# Patient Record
Sex: FEMALE | Race: WHITE | Employment: OTHER | URBAN - METROPOLITAN AREA
[De-identification: names, ages, dates, MRNs, and addresses within clinical notes are randomized per-mention and may not be internally consistent; named-entity substitution may affect disease eponyms.]

---

## 2018-09-21 ENCOUNTER — HOSPITAL ENCOUNTER (EMERGENCY)
Age: 78
Discharge: HOME | End: 2018-09-21
Attending: EMERGENCY MEDICINE
Payer: MEDICARE

## 2018-09-21 ENCOUNTER — APPOINTMENT (OUTPATIENT)
Dept: GENERAL RADIOLOGY | Age: 78
End: 2018-09-21
Payer: MEDICARE

## 2018-09-21 ENCOUNTER — APPOINTMENT (OUTPATIENT)
Dept: CT IMAGING | Age: 78
End: 2018-09-21
Payer: MEDICARE

## 2018-09-21 VITALS
OXYGEN SATURATION: 95 % | RESPIRATION RATE: 16 BRPM | HEART RATE: 66 BPM | BODY MASS INDEX: 34.27 KG/M2 | HEIGHT: 59 IN | SYSTOLIC BLOOD PRESSURE: 107 MMHG | WEIGHT: 170 LBS | DIASTOLIC BLOOD PRESSURE: 56 MMHG | TEMPERATURE: 97.4 F

## 2018-09-21 DIAGNOSIS — H81.10 BENIGN PAROXYSMAL POSITIONAL VERTIGO, UNSPECIFIED LATERALITY: Primary | ICD-10-CM

## 2018-09-21 DIAGNOSIS — R11.0 NAUSEA: ICD-10-CM

## 2018-09-21 LAB
A/G RATIO: 1.6 (ref 1.1–2.2)
ALBUMIN SERPL-MCNC: 4.4 G/DL (ref 3.4–5)
ALP BLD-CCNC: 68 U/L (ref 40–129)
ALT SERPL-CCNC: 8 U/L (ref 10–40)
ANION GAP SERPL CALCULATED.3IONS-SCNC: 16 MMOL/L (ref 3–16)
AST SERPL-CCNC: 10 U/L (ref 15–37)
BASOPHILS ABSOLUTE: 0.1 K/UL (ref 0–0.2)
BASOPHILS RELATIVE PERCENT: 0.9 %
BILIRUB SERPL-MCNC: 0.6 MG/DL (ref 0–1)
BUN BLDV-MCNC: 36 MG/DL (ref 7–20)
CALCIUM SERPL-MCNC: 9.3 MG/DL (ref 8.3–10.6)
CHLORIDE BLD-SCNC: 101 MMOL/L (ref 99–110)
CO2: 26 MMOL/L (ref 21–32)
CREAT SERPL-MCNC: 1 MG/DL (ref 0.6–1.2)
EKG ATRIAL RATE: 61 BPM
EKG DIAGNOSIS: NORMAL
EKG P AXIS: 15 DEGREES
EKG P-R INTERVAL: 142 MS
EKG Q-T INTERVAL: 462 MS
EKG QRS DURATION: 110 MS
EKG QTC CALCULATION (BAZETT): 465 MS
EKG R AXIS: -27 DEGREES
EKG T AXIS: 66 DEGREES
EKG VENTRICULAR RATE: 61 BPM
EOSINOPHILS ABSOLUTE: 0.2 K/UL (ref 0–0.6)
EOSINOPHILS RELATIVE PERCENT: 3.1 %
GFR AFRICAN AMERICAN: >60
GFR NON-AFRICAN AMERICAN: 54
GLOBULIN: 2.7 G/DL
GLUCOSE BLD-MCNC: 165 MG/DL (ref 70–99)
HCT VFR BLD CALC: 39.3 % (ref 36–48)
HEMOGLOBIN: 13.6 G/DL (ref 12–16)
LYMPHOCYTES ABSOLUTE: 2 K/UL (ref 1–5.1)
LYMPHOCYTES RELATIVE PERCENT: 32 %
MCH RBC QN AUTO: 33.1 PG (ref 26–34)
MCHC RBC AUTO-ENTMCNC: 34.7 G/DL (ref 31–36)
MCV RBC AUTO: 95.4 FL (ref 80–100)
MONOCYTES ABSOLUTE: 0.5 K/UL (ref 0–1.3)
MONOCYTES RELATIVE PERCENT: 8.3 %
NEUTROPHILS ABSOLUTE: 3.5 K/UL (ref 1.7–7.7)
NEUTROPHILS RELATIVE PERCENT: 55.7 %
PDW BLD-RTO: 12.5 % (ref 12.4–15.4)
PLATELET # BLD: 202 K/UL (ref 135–450)
PMV BLD AUTO: 8.5 FL (ref 5–10.5)
POTASSIUM SERPL-SCNC: 3.5 MMOL/L (ref 3.5–5.1)
RBC # BLD: 4.12 M/UL (ref 4–5.2)
SODIUM BLD-SCNC: 143 MMOL/L (ref 136–145)
TOTAL PROTEIN: 7.1 G/DL (ref 6.4–8.2)
TROPONIN: <0.01 NG/ML
WBC # BLD: 6.3 K/UL (ref 4–11)

## 2018-09-21 PROCEDURE — 93010 ELECTROCARDIOGRAM REPORT: CPT | Performed by: INTERNAL MEDICINE

## 2018-09-21 PROCEDURE — 85025 COMPLETE CBC W/AUTO DIFF WBC: CPT

## 2018-09-21 PROCEDURE — 70450 CT HEAD/BRAIN W/O DYE: CPT

## 2018-09-21 PROCEDURE — 93005 ELECTROCARDIOGRAM TRACING: CPT | Performed by: EMERGENCY MEDICINE

## 2018-09-21 PROCEDURE — 71045 X-RAY EXAM CHEST 1 VIEW: CPT

## 2018-09-21 PROCEDURE — 6360000002 HC RX W HCPCS

## 2018-09-21 PROCEDURE — 80053 COMPREHEN METABOLIC PANEL: CPT

## 2018-09-21 PROCEDURE — 6370000000 HC RX 637 (ALT 250 FOR IP): Performed by: EMERGENCY MEDICINE

## 2018-09-21 PROCEDURE — 84484 ASSAY OF TROPONIN QUANT: CPT

## 2018-09-21 PROCEDURE — 4500000002 HC ER NO CHARGE

## 2018-09-21 RX ORDER — MECLIZINE HCL 12.5 MG/1
25 TABLET ORAL ONCE
Status: COMPLETED | OUTPATIENT
Start: 2018-09-21 | End: 2018-09-21

## 2018-09-21 RX ORDER — ONDANSETRON 4 MG/1
4 TABLET, ORALLY DISINTEGRATING ORAL EVERY 8 HOURS PRN
Qty: 12 TABLET | Refills: 0 | Status: ON HOLD | OUTPATIENT
Start: 2018-09-21 | End: 2018-11-12 | Stop reason: ALTCHOICE

## 2018-09-21 RX ORDER — ONDANSETRON 2 MG/ML
INJECTION INTRAMUSCULAR; INTRAVENOUS
Status: COMPLETED
Start: 2018-09-21 | End: 2018-09-21

## 2018-09-21 RX ORDER — ONDANSETRON 2 MG/ML
4 INJECTION INTRAMUSCULAR; INTRAVENOUS ONCE
Status: COMPLETED | OUTPATIENT
Start: 2018-09-21 | End: 2018-09-21

## 2018-09-21 RX ORDER — MECLIZINE HYDROCHLORIDE 25 MG/1
25 TABLET ORAL 3 TIMES DAILY PRN
Qty: 30 TABLET | Refills: 0 | Status: SHIPPED | OUTPATIENT
Start: 2018-09-21 | End: 2018-10-01

## 2018-09-21 RX ADMIN — ONDANSETRON 4 MG: 2 INJECTION INTRAMUSCULAR; INTRAVENOUS at 09:34

## 2018-09-21 RX ADMIN — MECLIZINE 25 MG: 12.5 TABLET ORAL at 10:40

## 2018-09-21 ASSESSMENT — ENCOUNTER SYMPTOMS
NAUSEA: 1
CONSTIPATION: 0
RECTAL PAIN: 0
VOICE CHANGE: 0
COLOR CHANGE: 0
BACK PAIN: 0
CHEST TIGHTNESS: 0
STRIDOR: 0
SINUS PRESSURE: 0
BLOOD IN STOOL: 0
ABDOMINAL PAIN: 0
APNEA: 0
DIARRHEA: 0
SHORTNESS OF BREATH: 0
COUGH: 0
EYE DISCHARGE: 0
EYE PAIN: 0
PHOTOPHOBIA: 0
VOMITING: 0
EYE REDNESS: 0
ABDOMINAL DISTENTION: 0
RHINORRHEA: 0
SORE THROAT: 0
TROUBLE SWALLOWING: 0
WHEEZING: 0

## 2018-09-21 NOTE — ED PROVIDER NOTES
Chichi Bailon is a 69year old female who presents to the ED with dizziness and nausea \"for a while now\". She reports that for the last few months she \"hears birds singing in my (right) ear\". She states she woke up and felt like she was spinning and off balance. She has not vomited. She has had this for \"a few years\" but denies history of vertigo. She denies chest pain or SOB. Opening her eyes and head movement makes her symptoms worse. She denies headache. NIH stroke score 0 at time of my eam.         BP 99/86   Pulse 60   Temp 97.4 °F (36.3 °C) (Oral)   Resp 20   Ht 4' 11\" (1.499 m)   Wt 170 lb (77.1 kg)   SpO2 95%   BMI 34.34 kg/m²     I have reviewed the following from the nursing documentation:      Prior to Admission medications    Not on File       Allergies as of 09/21/2018    (No Known Allergies)       Past Medical History:   Diagnosis Date    Pacemaker         Surgical History:   Past Surgical History:   Procedure Laterality Date    CARDIAC SURGERY      pacemaker, defib    HYSTERECTOMY      JOINT REPLACEMENT      right and left        Family History:  History reviewed. No pertinent family history. Social History     Social History    Marital status:      Spouse name: N/A    Number of children: N/A    Years of education: N/A     Occupational History    Not on file. Social History Main Topics    Smoking status: Never Smoker    Smokeless tobacco: Never Used    Alcohol use No    Drug use: No    Sexual activity: Not on file     Other Topics Concern    Not on file     Social History Narrative    No narrative on file         Review of Systems   Constitutional: Negative for activity change, appetite change, chills, diaphoresis, fatigue, fever and unexpected weight change. HENT: Negative for congestion, ear pain, mouth sores, rhinorrhea, sinus pressure, sore throat, tinnitus, trouble swallowing and voice change.     Eyes: Negative for photophobia, pain, discharge, ([id number]) on 9/21/2018 11:01:26 AM       I estimate there is LOW risk for SUBARACHNOID HEMORRHAGE, MENINGITIS, INTRACRANIAL HEMORRHAGE, SUBDURAL HEMATOMA, OR STROKE, thus I consider the discharge disposition reasonable. Tamara Montanez and I have discussed the diagnosis and risks, and we agree with discharging home to follow-up with their primary doctor. We also discussed returning to the Emergency Department immediately if new or worsening symptoms occur. We have discussed the symptoms which are most concerning (e.g., changing or worsening pain, weakness, vomiting, fever) that necessitate immediate return. Final Impression    1. Benign paroxysmal positional vertigo, unspecified laterality        Discharge Vital Signs:  Blood pressure (!) 141/107, pulse 61, temperature 97.4 °F (36.3 °C), temperature source Oral, resp. rate 24, height 4' 11\" (1.499 m), weight 170 lb (77.1 kg), SpO2 99 %. Radiology  Ct Head Wo Contrast    Result Date: 9/21/2018  EXAMINATION: CT OF THE HEAD WITHOUT CONTRAST 9/21/2018 TECHNIQUE: CT of the head was performed without the administration of intravenous contrast. Dose modulation, iterative reconstruction, and/or weight based adjustment of the mA/kV was utilized to reduce the radiation dose to as low as reasonably achievable. COMPARISON: None HISTORY: ORDERING SYSTEM PROVIDED HISTORY: dizziness TECHNOLOGIST PROVIDED HISTORY: Has a \"code stroke\" or \"stroke alert\" been called? ->No Ordering Physician Provided Reason for Exam: Dizzyness x3 months, nausea Acuity: Acute Type of Exam: Initial FINDINGS: BRAIN/VENTRICLES:  No masses nor acute intracranial hemorrhage. Intact gray/white matter differentiation without findings of acute ischemia. No mass effect nor midline shift. Patent basilar cisterns and foramen magnum. No hydrocephalus. Minimal diffuse atrophy. Moderate subcortical, deep, and periventricular white matter hypodensities bilaterally.   Suspected old lacunar infarcts in the

## 2018-09-21 NOTE — ED NOTES
Daughter went to [facility] outpatient pharmacy to get prescriptions x 2 filled and took patient's clothing with her. Will complete discharge once she returns.       Reba Mccarthy RN  09/21/18 2710

## 2018-09-21 NOTE — ED NOTES
states patient started with dizziness, vomiting this AM.  Patient has a [employer] defibrillator, denies being shocked this AM.  Will monitor patient.       Shaji Fletcher RN  09/21/18 0332

## 2018-09-21 NOTE — ED NOTES
Pt states she is feeling much better, dizziness persists, but is much improved.       Pranay Mcbride RN  09/21/18 4713

## 2018-11-12 ENCOUNTER — HOSPITAL ENCOUNTER (OUTPATIENT)
Age: 78
Setting detail: OUTPATIENT SURGERY
Discharge: HOME | End: 2018-11-12
Attending: INTERNAL MEDICINE | Admitting: INTERNAL MEDICINE
Payer: MEDICARE

## 2018-11-12 VITALS
TEMPERATURE: 97.4 F | DIASTOLIC BLOOD PRESSURE: 53 MMHG | WEIGHT: 182 LBS | HEART RATE: 60 BPM | BODY MASS INDEX: 38.2 KG/M2 | SYSTOLIC BLOOD PRESSURE: 119 MMHG | OXYGEN SATURATION: 96 % | RESPIRATION RATE: 16 BRPM | HEIGHT: 58 IN

## 2018-11-12 PROCEDURE — 88305 TISSUE EXAM BY PATHOLOGIST: CPT

## 2018-11-12 PROCEDURE — 2709999900 HC NON-CHARGEABLE SUPPLY: Performed by: INTERNAL MEDICINE

## 2018-11-12 PROCEDURE — 88342 IMHCHEM/IMCYTCHM 1ST ANTB: CPT

## 2018-11-12 PROCEDURE — 7100000010 HC PHASE II RECOVERY - FIRST 15 MIN: Performed by: INTERNAL MEDICINE

## 2018-11-12 PROCEDURE — 6360000002 HC RX W HCPCS: Performed by: INTERNAL MEDICINE

## 2018-11-12 PROCEDURE — 3609015300 HC ESOPHAGEAL DILATION MALONEY: Performed by: INTERNAL MEDICINE

## 2018-11-12 PROCEDURE — 3609012400 HC EGD TRANSORAL BIOPSY SINGLE/MULTIPLE: Performed by: INTERNAL MEDICINE

## 2018-11-12 PROCEDURE — 99152 MOD SED SAME PHYS/QHP 5/>YRS: CPT | Performed by: INTERNAL MEDICINE

## 2018-11-12 PROCEDURE — 2580000003 HC RX 258: Performed by: INTERNAL MEDICINE

## 2018-11-12 PROCEDURE — 7100000011 HC PHASE II RECOVERY - ADDTL 15 MIN: Performed by: INTERNAL MEDICINE

## 2018-11-12 RX ORDER — LOSARTAN POTASSIUM AND HYDROCHLOROTHIAZIDE 25; 100 MG/1; MG/1
1 TABLET ORAL DAILY
COMMUNITY
End: 2019-07-19 | Stop reason: ALTCHOICE

## 2018-11-12 RX ORDER — SODIUM CHLORIDE, SODIUM LACTATE, POTASSIUM CHLORIDE, CALCIUM CHLORIDE 600; 310; 30; 20 MG/100ML; MG/100ML; MG/100ML; MG/100ML
INJECTION, SOLUTION INTRAVENOUS CONTINUOUS
Status: DISCONTINUED | OUTPATIENT
Start: 2018-11-12 | End: 2018-11-12 | Stop reason: HOSPADM

## 2018-11-12 RX ORDER — ROPINIROLE 0.5 MG/1
0.5 TABLET, FILM COATED ORAL NIGHTLY
COMMUNITY

## 2018-11-12 RX ORDER — GABAPENTIN 300 MG/1
300 CAPSULE ORAL 3 TIMES DAILY
COMMUNITY
End: 2019-05-24

## 2018-11-12 RX ORDER — ISOSORBIDE MONONITRATE 60 MG/1
60 TABLET, EXTENDED RELEASE ORAL DAILY
COMMUNITY
End: 2018-12-03 | Stop reason: DRUGHIGH

## 2018-11-12 RX ORDER — MIDAZOLAM HYDROCHLORIDE 5 MG/ML
INJECTION INTRAMUSCULAR; INTRAVENOUS PRN
Status: DISCONTINUED | OUTPATIENT
Start: 2018-11-12 | End: 2018-11-12 | Stop reason: HOSPADM

## 2018-11-12 RX ORDER — FENTANYL CITRATE 50 UG/ML
INJECTION, SOLUTION INTRAMUSCULAR; INTRAVENOUS PRN
Status: DISCONTINUED | OUTPATIENT
Start: 2018-11-12 | End: 2018-11-12 | Stop reason: HOSPADM

## 2018-11-12 RX ORDER — ATORVASTATIN CALCIUM 40 MG/1
40 TABLET, FILM COATED ORAL DAILY
COMMUNITY

## 2018-11-12 RX ORDER — FUROSEMIDE 20 MG/1
20 TABLET ORAL DAILY
COMMUNITY
End: 2018-12-03 | Stop reason: SINTOL

## 2018-11-12 RX ORDER — OMEPRAZOLE 20 MG/1
40 CAPSULE, DELAYED RELEASE ORAL DAILY
COMMUNITY
End: 2019-08-15 | Stop reason: SDUPTHER

## 2018-11-12 RX ORDER — POTASSIUM CHLORIDE 750 MG/1
10 TABLET, EXTENDED RELEASE ORAL EVERY OTHER DAY
COMMUNITY
End: 2019-05-24 | Stop reason: CLARIF

## 2018-11-12 RX ORDER — AMLODIPINE BESYLATE 10 MG/1
10 TABLET ORAL DAILY
COMMUNITY
End: 2019-05-24

## 2018-11-12 RX ORDER — SERTRALINE HYDROCHLORIDE 100 MG/1
100 TABLET, FILM COATED ORAL DAILY
COMMUNITY

## 2018-11-12 ASSESSMENT — PAIN DESCRIPTION - LOCATION: LOCATION: THROAT

## 2018-11-12 ASSESSMENT — PAIN - FUNCTIONAL ASSESSMENT: PAIN_FUNCTIONAL_ASSESSMENT: 0-10

## 2018-11-12 ASSESSMENT — PAIN SCALES - GENERAL: PAINLEVEL_OUTOF10: 9

## 2018-11-12 NOTE — OP NOTE
Esophagogastroduodenoscopy Note    Patient:   Diane Gomez    YOB: 1940    Facility:   [facility] [Outpatient]   Referring/PCP: LAVELLE Fernandez CNP    Procedure:   Esophagogastroduodenoscopy with esophageal dilation  Date:     11/12/2018   Endoscopist:  Cain Peña     Preoperative Diagnosis:   Dysphagia    Postoperative Diagnosis:  normal    Anesthesia:  Versed 5 mg IV, fentanyl 100 mcg IV; Procedural Sedation Time: 11 minutes. Estimated blood loss: Minimal    Complications: None    Description of Procedure:  Informed consent was obtained from the patient after explanation of the procedure including indications, description of the procedure,  benefits and possible risks and complications of the procedure, and alternatives. Questions were answered. The patient's history was reviewed and a directed physical examination was performed prior to the procedure. Patient was monitored throughout the procedure with pulse oximetry and periodic assessment of vital signs. Patient was sedated as noted above. The Nursing staff and I performed a time out. With the patient in the left lateral decubitus position, the Olympus videoendoscope was placed in the patient's mouth and under direct visualization passed into the esophagus. The scope was ultimately passed to the second portion of the duodenum. Visualization was performed during both introduction and withdrawal of the endoscope and retroflexed view of the proximal stomach was obtained. Findings[de-identified]   Esophagus: abnormal: a patch of ectopic tissue was seen at 36cm and biopsied. The findings do support a diagnosis of Ramirez's Esophagus. The patient was dilated with a 47 Fr Virgen. Stomach: abnormal: erosive gastritis was seen. Biopsies were taken  Duodenum: normal.  Cold biopsies were taken of the bulb and second portion    Recommendations:   -Await pathology  -Follow symptoms.     Belkis Kumari

## 2018-12-03 ENCOUNTER — OFFICE VISIT (OUTPATIENT)
Dept: CARDIOLOGY CLINIC | Age: 78
End: 2018-12-03
Payer: MEDICARE

## 2018-12-03 ENCOUNTER — PROCEDURE VISIT (OUTPATIENT)
Dept: CARDIOLOGY CLINIC | Age: 78
End: 2018-12-03
Payer: MEDICARE

## 2018-12-03 VITALS
HEART RATE: 66 BPM | DIASTOLIC BLOOD PRESSURE: 56 MMHG | BODY MASS INDEX: 38.41 KG/M2 | WEIGHT: 183 LBS | OXYGEN SATURATION: 92 % | SYSTOLIC BLOOD PRESSURE: 80 MMHG | HEIGHT: 58 IN

## 2018-12-03 DIAGNOSIS — Z95.0 CARDIAC PACEMAKER IN SITU: ICD-10-CM

## 2018-12-03 DIAGNOSIS — R42 DIZZINESS: ICD-10-CM

## 2018-12-03 DIAGNOSIS — I95.1 ORTHOSTATIC HYPOTENSION: ICD-10-CM

## 2018-12-03 DIAGNOSIS — I49.9 IRREGULAR HEART RATE: Primary | ICD-10-CM

## 2018-12-03 DIAGNOSIS — Z95.0 CARDIAC PACEMAKER IN SITU: Primary | ICD-10-CM

## 2018-12-03 PROCEDURE — 1101F PT FALLS ASSESS-DOCD LE1/YR: CPT | Performed by: INTERNAL MEDICINE

## 2018-12-03 PROCEDURE — 1090F PRES/ABSN URINE INCON ASSESS: CPT | Performed by: INTERNAL MEDICINE

## 2018-12-03 PROCEDURE — G8427 DOCREV CUR MEDS BY ELIG CLIN: HCPCS | Performed by: INTERNAL MEDICINE

## 2018-12-03 PROCEDURE — 93279 PRGRMG DEV EVAL PM/LDLS PM: CPT | Performed by: INTERNAL MEDICINE

## 2018-12-03 PROCEDURE — G8417 CALC BMI ABV UP PARAM F/U: HCPCS | Performed by: INTERNAL MEDICINE

## 2018-12-03 PROCEDURE — 93000 ELECTROCARDIOGRAM COMPLETE: CPT | Performed by: INTERNAL MEDICINE

## 2018-12-03 PROCEDURE — G8484 FLU IMMUNIZE NO ADMIN: HCPCS | Performed by: INTERNAL MEDICINE

## 2018-12-03 PROCEDURE — 99204 OFFICE O/P NEW MOD 45 MIN: CPT | Performed by: INTERNAL MEDICINE

## 2018-12-03 RX ORDER — ISOSORBIDE MONONITRATE 30 MG/1
30 TABLET, EXTENDED RELEASE ORAL DAILY
Qty: 30 TABLET | Refills: 5 | Status: SHIPPED
Start: 2018-12-03 | End: 2019-06-13 | Stop reason: SINTOL

## 2018-12-03 NOTE — PATIENT INSTRUCTIONS
RECOMMENDATIONS:  1. Stay well hydrated with water and Gatorade. 2. Stop Lasix due to orthostatic hypotension. 3. Decrease Imdur to 30 mg daily. 4. Follow up with Ramiro Ahumada CNP in 3 months.

## 2018-12-03 NOTE — LETTER
[address] Cardiology - [facility] [facility] [address] [place] [address]  Phone: [phone]  Fax: [phone]    Valeri Palacio MD        December 4, 2018     LAVELLE Milian CNP  [address] [place] [id number]    Patient: Jake Maharaj  MR Number: [id number]  YOB: 1940  Date of Visit: 12/3/2018    Dear Dr. Varinder Whyte:           [facility]   Electrophysiology Consult Note              Date:  December 3, 2018  Patient name: Jake Maharaj  YOB: 1940    Reason for Consult:  New Patient    Requesting Physician: LAVELLE Milian CNP     HISTORY OF PRESENT ILLNESS: Jake Maharaj is a 69 y.o. female who presents to [facility] care with a new cardiologist. She has had a device of some kind placed a few years ago. She is unsure about her cardiac history and no records are available at this time. Her orthostatic blood pressure were extremely positive today on exam. She states she feels dizziness when standing quite often. She has fallen and required the squad to help her get up. She at times gets lower extremity edema. Patient currently denies any weight gain, palpitations, chest pain, shortness of breath, and syncope. Past Medical History:   has a past medical history of Arthritis; CAD (coronary artery disease); History of blood transfusion; Hypertension; and Pacemaker. Past Surgical History:   has a past surgical history that includes joint replacement; Cardiac surgery; Hysterectomy; Upper gastrointestinal endoscopy; pr egd flex removal lesion(s) by hot biopsy forceps (11/12/2018); and Upper gastrointestinal endoscopy (N/A, 11/12/2018). Allergies:  Patient has no known allergies. Social History:   reports that she has never smoked. She has never used smokeless tobacco. She reports that she does not drink alcohol or use drugs.      Family History:   No coronary artery disease   Home Medications: scribed note accurately describes my personal service to the patient.        All questions and concerns were addressed to the patient/family. Alternatives to my treatment were discussed. FERCHO Crowder F.A.C.C. Electrophysiology  [facility]. [address]. [address]. [place] [address]  Phone: [phone]  Fax: [phone]            If you have questions, please do not hesitate to call me. I look forward to following [facility] along with you.     Sincerely,        Efren Severs, MD

## 2018-12-06 ENCOUNTER — TELEPHONE (OUTPATIENT)
Dept: CARDIOLOGY CLINIC | Age: 78
End: 2018-12-06

## 2019-01-01 ENCOUNTER — OFFICE VISIT (OUTPATIENT)
Dept: CARDIOLOGY CLINIC | Age: 79
End: 2019-01-01
Payer: MEDICARE

## 2019-01-01 ENCOUNTER — TELEPHONE (OUTPATIENT)
Dept: CARDIOLOGY CLINIC | Age: 79
End: 2019-01-01

## 2019-01-01 ENCOUNTER — TELEPHONE (OUTPATIENT)
Dept: GASTROENTEROLOGY | Age: 79
End: 2019-01-01

## 2019-01-01 ENCOUNTER — HOSPITAL ENCOUNTER (OUTPATIENT)
Age: 79
Setting detail: OBSERVATION
Discharge: NURSING, REHAB OR LONG-TERM CARE | End: 2019-12-14
Attending: EMERGENCY MEDICINE | Admitting: FAMILY MEDICINE
Payer: MEDICARE

## 2019-01-01 ENCOUNTER — APPOINTMENT (OUTPATIENT)
Dept: CT IMAGING | Age: 79
End: 2019-01-01
Payer: MEDICARE

## 2019-01-01 ENCOUNTER — NURSE ONLY (OUTPATIENT)
Dept: CARDIOLOGY CLINIC | Age: 79
End: 2019-01-01
Payer: MEDICARE

## 2019-01-01 ENCOUNTER — TELEPHONE (OUTPATIENT)
Dept: FAMILY MEDICINE CLINIC | Age: 79
End: 2019-01-01

## 2019-01-01 ENCOUNTER — APPOINTMENT (OUTPATIENT)
Dept: GENERAL RADIOLOGY | Age: 79
End: 2019-01-01
Payer: MEDICARE

## 2019-01-01 VITALS
SYSTOLIC BLOOD PRESSURE: 100 MMHG | WEIGHT: 191 LBS | DIASTOLIC BLOOD PRESSURE: 62 MMHG | BODY MASS INDEX: 40.09 KG/M2 | HEIGHT: 58 IN

## 2019-01-01 VITALS
RESPIRATION RATE: 18 BRPM | HEIGHT: 58 IN | DIASTOLIC BLOOD PRESSURE: 78 MMHG | HEART RATE: 60 BPM | SYSTOLIC BLOOD PRESSURE: 156 MMHG | TEMPERATURE: 97.4 F | BODY MASS INDEX: 40.54 KG/M2 | OXYGEN SATURATION: 94 % | WEIGHT: 193.12 LBS

## 2019-01-01 DIAGNOSIS — R26.2 UNABLE TO WALK: ICD-10-CM

## 2019-01-01 DIAGNOSIS — M79.2 NERVE PAIN: ICD-10-CM

## 2019-01-01 DIAGNOSIS — Z95.0 PACEMAKER: ICD-10-CM

## 2019-01-01 DIAGNOSIS — I48.0 PAROXYSMAL ATRIAL FIBRILLATION (HCC): Primary | ICD-10-CM

## 2019-01-01 DIAGNOSIS — R51.9 CHRONIC NONINTRACTABLE HEADACHE, UNSPECIFIED HEADACHE TYPE: ICD-10-CM

## 2019-01-01 DIAGNOSIS — I49.5 SINUS NODE DYSFUNCTION (HCC): ICD-10-CM

## 2019-01-01 DIAGNOSIS — R06.02 SHORTNESS OF BREATH: ICD-10-CM

## 2019-01-01 DIAGNOSIS — K62.5 RECTAL BLEEDING: Primary | ICD-10-CM

## 2019-01-01 DIAGNOSIS — G89.29 CHRONIC NONINTRACTABLE HEADACHE, UNSPECIFIED HEADACHE TYPE: ICD-10-CM

## 2019-01-01 DIAGNOSIS — N30.00 ACUTE CYSTITIS WITHOUT HEMATURIA: Primary | ICD-10-CM

## 2019-01-01 DIAGNOSIS — I70.90 ATHEROSCLEROSIS: ICD-10-CM

## 2019-01-01 DIAGNOSIS — I48.0 PAROXYSMAL ATRIAL FIBRILLATION (HCC): ICD-10-CM

## 2019-01-01 DIAGNOSIS — R42 LIGHTHEADEDNESS: ICD-10-CM

## 2019-01-01 LAB
A/G RATIO: 1.5 (ref 1.1–2.2)
ALBUMIN SERPL-MCNC: 4.3 G/DL (ref 3.4–5)
ALP BLD-CCNC: 73 U/L (ref 40–129)
ALT SERPL-CCNC: 7 U/L (ref 10–40)
ANION GAP SERPL CALCULATED.3IONS-SCNC: 11 MMOL/L (ref 3–16)
ANION GAP SERPL CALCULATED.3IONS-SCNC: 8 MMOL/L (ref 3–16)
AST SERPL-CCNC: 10 U/L (ref 15–37)
BACTERIA: ABNORMAL /HPF
BASOPHILS ABSOLUTE: 0 K/UL (ref 0–0.2)
BASOPHILS ABSOLUTE: 0.1 K/UL (ref 0–0.2)
BASOPHILS RELATIVE PERCENT: 0.6 %
BASOPHILS RELATIVE PERCENT: 1.2 %
BILIRUB SERPL-MCNC: 0.4 MG/DL (ref 0–1)
BILIRUBIN URINE: NEGATIVE
BLOOD, URINE: NEGATIVE
BUN BLDV-MCNC: 15 MG/DL (ref 7–20)
BUN BLDV-MCNC: 21 MG/DL (ref 7–20)
CALCIUM SERPL-MCNC: 8.6 MG/DL (ref 8.3–10.6)
CALCIUM SERPL-MCNC: 9.3 MG/DL (ref 8.3–10.6)
CHLORIDE BLD-SCNC: 104 MMOL/L (ref 99–110)
CHLORIDE BLD-SCNC: 108 MMOL/L (ref 99–110)
CLARITY: CLEAR
CO2: 25 MMOL/L (ref 21–32)
CO2: 25 MMOL/L (ref 21–32)
COLOR: YELLOW
CREAT SERPL-MCNC: 0.8 MG/DL (ref 0.6–1.2)
CREAT SERPL-MCNC: 0.8 MG/DL (ref 0.6–1.2)
EKG ATRIAL RATE: 60 BPM
EKG DIAGNOSIS: NORMAL
EKG P AXIS: 6 DEGREES
EKG P-R INTERVAL: 152 MS
EKG Q-T INTERVAL: 414 MS
EKG QRS DURATION: 96 MS
EKG QTC CALCULATION (BAZETT): 414 MS
EKG R AXIS: -27 DEGREES
EKG T AXIS: -4 DEGREES
EKG VENTRICULAR RATE: 60 BPM
EOSINOPHILS ABSOLUTE: 0.2 K/UL (ref 0–0.6)
EOSINOPHILS ABSOLUTE: 0.2 K/UL (ref 0–0.6)
EOSINOPHILS RELATIVE PERCENT: 3.4 %
EOSINOPHILS RELATIVE PERCENT: 3.5 %
EPITHELIAL CELLS, UA: ABNORMAL /HPF
GFR AFRICAN AMERICAN: >60
GFR AFRICAN AMERICAN: >60
GFR NON-AFRICAN AMERICAN: >60
GFR NON-AFRICAN AMERICAN: >60
GI BACTERIAL PATHOGENS BY PCR: NORMAL
GLOBULIN: 2.8 G/DL
GLUCOSE BLD-MCNC: 88 MG/DL (ref 70–99)
GLUCOSE BLD-MCNC: 96 MG/DL (ref 70–99)
GLUCOSE URINE: NEGATIVE MG/DL
HCT VFR BLD CALC: 37.8 % (ref 36–48)
HCT VFR BLD CALC: 41.5 % (ref 36–48)
HEMOGLOBIN: 12.5 G/DL (ref 12–16)
HEMOGLOBIN: 13.9 G/DL (ref 12–16)
KETONES, URINE: NEGATIVE MG/DL
LEUKOCYTE ESTERASE, URINE: ABNORMAL
LIPASE: 71 U/L (ref 13–60)
LV EF: 55 %
LVEF MODALITY: NORMAL
LYMPHOCYTES ABSOLUTE: 1.4 K/UL (ref 1–5.1)
LYMPHOCYTES ABSOLUTE: 1.6 K/UL (ref 1–5.1)
LYMPHOCYTES RELATIVE PERCENT: 29.8 %
LYMPHOCYTES RELATIVE PERCENT: 31.5 %
MAGNESIUM: 2 MG/DL (ref 1.8–2.4)
MCH RBC QN AUTO: 31.7 PG (ref 26–34)
MCH RBC QN AUTO: 31.7 PG (ref 26–34)
MCHC RBC AUTO-ENTMCNC: 33.1 G/DL (ref 31–36)
MCHC RBC AUTO-ENTMCNC: 33.4 G/DL (ref 31–36)
MCV RBC AUTO: 94.9 FL (ref 80–100)
MCV RBC AUTO: 95.6 FL (ref 80–100)
MICROSCOPIC EXAMINATION: YES
MONOCYTES ABSOLUTE: 0.5 K/UL (ref 0–1.3)
MONOCYTES ABSOLUTE: 0.6 K/UL (ref 0–1.3)
MONOCYTES RELATIVE PERCENT: 10.7 %
MONOCYTES RELATIVE PERCENT: 11.7 %
NEUTROPHILS ABSOLUTE: 2.4 K/UL (ref 1.7–7.7)
NEUTROPHILS ABSOLUTE: 2.9 K/UL (ref 1.7–7.7)
NEUTROPHILS RELATIVE PERCENT: 53.1 %
NEUTROPHILS RELATIVE PERCENT: 54.5 %
NITRITE, URINE: POSITIVE
OCCULT BLOOD SCREENING: NORMAL
PDW BLD-RTO: 12.9 % (ref 12.4–15.4)
PDW BLD-RTO: 13.4 % (ref 12.4–15.4)
PH UA: 6 (ref 5–8)
PLATELET # BLD: 187 K/UL (ref 135–450)
PLATELET # BLD: 213 K/UL (ref 135–450)
PMV BLD AUTO: 8.2 FL (ref 5–10.5)
PMV BLD AUTO: 8.3 FL (ref 5–10.5)
POTASSIUM REFLEX MAGNESIUM: 3.6 MMOL/L (ref 3.5–5.1)
POTASSIUM SERPL-SCNC: 4.1 MMOL/L (ref 3.5–5.1)
PROTEIN UA: NEGATIVE MG/DL
RBC # BLD: 3.96 M/UL (ref 4–5.2)
RBC # BLD: 4.37 M/UL (ref 4–5.2)
RBC UA: ABNORMAL /HPF (ref 0–2)
SEDIMENTATION RATE, ERYTHROCYTE: 23 MM/HR (ref 0–30)
SODIUM BLD-SCNC: 140 MMOL/L (ref 136–145)
SODIUM BLD-SCNC: 141 MMOL/L (ref 136–145)
SPECIFIC GRAVITY UA: 1.02 (ref 1–1.03)
TOTAL PROTEIN: 7.1 G/DL (ref 6.4–8.2)
TROPONIN: <0.01 NG/ML
URINE TYPE: ABNORMAL
UROBILINOGEN, URINE: 0.2 E.U./DL
WBC # BLD: 4.5 K/UL (ref 4–11)
WBC # BLD: 5.4 K/UL (ref 4–11)
WBC UA: ABNORMAL /HPF (ref 0–5)

## 2019-01-01 PROCEDURE — G0378 HOSPITAL OBSERVATION PER HR: HCPCS

## 2019-01-01 PROCEDURE — 97110 THERAPEUTIC EXERCISES: CPT

## 2019-01-01 PROCEDURE — G8417 CALC BMI ABV UP PARAM F/U: HCPCS | Performed by: INTERNAL MEDICINE

## 2019-01-01 PROCEDURE — 71046 X-RAY EXAM CHEST 2 VIEWS: CPT

## 2019-01-01 PROCEDURE — 97530 THERAPEUTIC ACTIVITIES: CPT

## 2019-01-01 PROCEDURE — 96375 TX/PRO/DX INJ NEW DRUG ADDON: CPT

## 2019-01-01 PROCEDURE — 2500000003 HC RX 250 WO HCPCS: Performed by: NURSE PRACTITIONER

## 2019-01-01 PROCEDURE — G0328 FECAL BLOOD SCRN IMMUNOASSAY: HCPCS

## 2019-01-01 PROCEDURE — 93306 TTE W/DOPPLER COMPLETE: CPT

## 2019-01-01 PROCEDURE — 96365 THER/PROPH/DIAG IV INF INIT: CPT

## 2019-01-01 PROCEDURE — 83690 ASSAY OF LIPASE: CPT

## 2019-01-01 PROCEDURE — 85025 COMPLETE CBC W/AUTO DIFF WBC: CPT

## 2019-01-01 PROCEDURE — 6370000000 HC RX 637 (ALT 250 FOR IP): Performed by: NURSE PRACTITIONER

## 2019-01-01 PROCEDURE — 80053 COMPREHEN METABOLIC PANEL: CPT

## 2019-01-01 PROCEDURE — 97165 OT EVAL LOW COMPLEX 30 MIN: CPT

## 2019-01-01 PROCEDURE — 87505 NFCT AGENT DETECTION GI: CPT

## 2019-01-01 PROCEDURE — 2580000003 HC RX 258: Performed by: NURSE PRACTITIONER

## 2019-01-01 PROCEDURE — 85652 RBC SED RATE AUTOMATED: CPT

## 2019-01-01 PROCEDURE — 93005 ELECTROCARDIOGRAM TRACING: CPT | Performed by: EMERGENCY MEDICINE

## 2019-01-01 PROCEDURE — 81001 URINALYSIS AUTO W/SCOPE: CPT

## 2019-01-01 PROCEDURE — 6360000002 HC RX W HCPCS: Performed by: NURSE PRACTITIONER

## 2019-01-01 PROCEDURE — 99285 EMERGENCY DEPT VISIT HI MDM: CPT

## 2019-01-01 PROCEDURE — 6370000000 HC RX 637 (ALT 250 FOR IP): Performed by: EMERGENCY MEDICINE

## 2019-01-01 PROCEDURE — 97162 PT EVAL MOD COMPLEX 30 MIN: CPT

## 2019-01-01 PROCEDURE — 36415 COLL VENOUS BLD VENIPUNCTURE: CPT

## 2019-01-01 PROCEDURE — G8484 FLU IMMUNIZE NO ADMIN: HCPCS | Performed by: INTERNAL MEDICINE

## 2019-01-01 PROCEDURE — 1090F PRES/ABSN URINE INCON ASSESS: CPT | Performed by: INTERNAL MEDICINE

## 2019-01-01 PROCEDURE — 83735 ASSAY OF MAGNESIUM: CPT

## 2019-01-01 PROCEDURE — 6360000002 HC RX W HCPCS: Performed by: EMERGENCY MEDICINE

## 2019-01-01 PROCEDURE — 93280 PM DEVICE PROGR EVAL DUAL: CPT | Performed by: INTERNAL MEDICINE

## 2019-01-01 PROCEDURE — 70450 CT HEAD/BRAIN W/O DYE: CPT

## 2019-01-01 PROCEDURE — 84484 ASSAY OF TROPONIN QUANT: CPT

## 2019-01-01 PROCEDURE — 99214 OFFICE O/P EST MOD 30 MIN: CPT | Performed by: INTERNAL MEDICINE

## 2019-01-01 PROCEDURE — 96372 THER/PROPH/DIAG INJ SC/IM: CPT

## 2019-01-01 PROCEDURE — 80048 BASIC METABOLIC PNL TOTAL CA: CPT

## 2019-01-01 PROCEDURE — 2580000003 HC RX 258: Performed by: EMERGENCY MEDICINE

## 2019-01-01 PROCEDURE — 97535 SELF CARE MNGMENT TRAINING: CPT

## 2019-01-01 PROCEDURE — 70498 CT ANGIOGRAPHY NECK: CPT

## 2019-01-01 PROCEDURE — 6360000004 HC RX CONTRAST MEDICATION: Performed by: EMERGENCY MEDICINE

## 2019-01-01 PROCEDURE — 96361 HYDRATE IV INFUSION ADD-ON: CPT

## 2019-01-01 PROCEDURE — G8427 DOCREV CUR MEDS BY ELIG CLIN: HCPCS | Performed by: INTERNAL MEDICINE

## 2019-01-01 RX ORDER — 0.9 % SODIUM CHLORIDE 0.9 %
1000 INTRAVENOUS SOLUTION INTRAVENOUS ONCE
Status: COMPLETED | OUTPATIENT
Start: 2019-01-01 | End: 2019-01-01

## 2019-01-01 RX ORDER — ONDANSETRON 2 MG/ML
4 INJECTION INTRAMUSCULAR; INTRAVENOUS ONCE
Status: COMPLETED | OUTPATIENT
Start: 2019-01-01 | End: 2019-01-01

## 2019-01-01 RX ORDER — ACETAMINOPHEN 325 MG/1
650 TABLET ORAL EVERY 4 HOURS PRN
Status: DISCONTINUED | OUTPATIENT
Start: 2019-01-01 | End: 2019-01-01 | Stop reason: HOSPADM

## 2019-01-01 RX ORDER — POLYETHYLENE GLYCOL 3350 17 G/17G
238 POWDER ORAL ONCE
Qty: 238 G | Refills: 0 | Status: SHIPPED | OUTPATIENT
Start: 2019-01-01 | End: 2019-01-01

## 2019-01-01 RX ORDER — MECLIZINE HCL 12.5 MG/1
25 TABLET ORAL DAILY
Status: DISCONTINUED | OUTPATIENT
Start: 2019-01-01 | End: 2019-01-01 | Stop reason: HOSPADM

## 2019-01-01 RX ORDER — ATORVASTATIN CALCIUM 40 MG/1
40 TABLET, FILM COATED ORAL DAILY
Status: DISCONTINUED | OUTPATIENT
Start: 2019-01-01 | End: 2019-01-01 | Stop reason: HOSPADM

## 2019-01-01 RX ORDER — BUTALBITAL, ACETAMINOPHEN AND CAFFEINE 50; 325; 40 MG/1; MG/1; MG/1
1 TABLET ORAL ONCE
Status: COMPLETED | OUTPATIENT
Start: 2019-01-01 | End: 2019-01-01

## 2019-01-01 RX ORDER — GABAPENTIN 300 MG/1
300 CAPSULE ORAL 3 TIMES DAILY
Qty: 15 CAPSULE | Refills: 0 | Status: ON HOLD | OUTPATIENT
Start: 2019-01-01 | End: 2020-01-01 | Stop reason: HOSPADM

## 2019-01-01 RX ORDER — SODIUM CHLORIDE 0.9 % (FLUSH) 0.9 %
10 SYRINGE (ML) INJECTION PRN
Status: DISCONTINUED | OUTPATIENT
Start: 2019-01-01 | End: 2019-01-01 | Stop reason: HOSPADM

## 2019-01-01 RX ORDER — ONDANSETRON 2 MG/ML
4 INJECTION INTRAMUSCULAR; INTRAVENOUS EVERY 6 HOURS PRN
Status: DISCONTINUED | OUTPATIENT
Start: 2019-01-01 | End: 2019-01-01 | Stop reason: HOSPADM

## 2019-01-01 RX ORDER — SODIUM CHLORIDE 0.9 % (FLUSH) 0.9 %
10 SYRINGE (ML) INJECTION EVERY 12 HOURS SCHEDULED
Status: DISCONTINUED | OUTPATIENT
Start: 2019-01-01 | End: 2019-01-01 | Stop reason: HOSPADM

## 2019-01-01 RX ORDER — SODIUM CHLORIDE 9 MG/ML
INJECTION, SOLUTION INTRAVENOUS CONTINUOUS
Status: DISCONTINUED | OUTPATIENT
Start: 2019-01-01 | End: 2019-01-01 | Stop reason: HOSPADM

## 2019-01-01 RX ORDER — CIPROFLOXACIN 250 MG/1
250 TABLET, FILM COATED ORAL 2 TIMES DAILY
Refills: 0 | DISCHARGE
Start: 2019-01-01 | End: 2019-01-01

## 2019-01-01 RX ORDER — PANTOPRAZOLE SODIUM 40 MG/1
40 TABLET, DELAYED RELEASE ORAL
Status: DISCONTINUED | OUTPATIENT
Start: 2019-01-01 | End: 2019-01-01 | Stop reason: HOSPADM

## 2019-01-01 RX ORDER — GABAPENTIN 300 MG/1
300 CAPSULE ORAL 3 TIMES DAILY
Status: DISCONTINUED | OUTPATIENT
Start: 2019-01-01 | End: 2019-01-01 | Stop reason: HOSPADM

## 2019-01-01 RX ORDER — ACETAMINOPHEN 325 MG/1
650 TABLET ORAL ONCE
Status: COMPLETED | OUTPATIENT
Start: 2019-01-01 | End: 2019-01-01

## 2019-01-01 RX ADMIN — MECLIZINE 25 MG: 12.5 TABLET ORAL at 10:02

## 2019-01-01 RX ADMIN — GABAPENTIN 300 MG: 300 CAPSULE ORAL at 21:03

## 2019-01-01 RX ADMIN — ACETAMINOPHEN 650 MG: 325 TABLET ORAL at 00:36

## 2019-01-01 RX ADMIN — BUTALBITAL, ACETAMINOPHEN, AND CAFFEINE 1 TABLET: 50; 325; 40 TABLET ORAL at 17:25

## 2019-01-01 RX ADMIN — ACETAMINOPHEN 650 MG: 325 TABLET ORAL at 21:03

## 2019-01-01 RX ADMIN — MICONAZOLE NITRATE: 20 POWDER TOPICAL at 03:53

## 2019-01-01 RX ADMIN — SODIUM CHLORIDE: 9 INJECTION, SOLUTION INTRAVENOUS at 05:23

## 2019-01-01 RX ADMIN — ATORVASTATIN CALCIUM 40 MG: 40 TABLET, FILM COATED ORAL at 09:47

## 2019-01-01 RX ADMIN — ACETAMINOPHEN 650 MG: 325 TABLET ORAL at 05:23

## 2019-01-01 RX ADMIN — Medication 10 ML: at 21:05

## 2019-01-01 RX ADMIN — IOPAMIDOL 75 ML: 755 INJECTION, SOLUTION INTRAVENOUS at 18:11

## 2019-01-01 RX ADMIN — ENOXAPARIN SODIUM 40 MG: 40 INJECTION SUBCUTANEOUS at 10:02

## 2019-01-01 RX ADMIN — GABAPENTIN 300 MG: 300 CAPSULE ORAL at 15:04

## 2019-01-01 RX ADMIN — MICONAZOLE NITRATE: 20 POWDER TOPICAL at 10:02

## 2019-01-01 RX ADMIN — ONDANSETRON 4 MG: 2 INJECTION INTRAMUSCULAR; INTRAVENOUS at 15:07

## 2019-01-01 RX ADMIN — SODIUM CHLORIDE 1000 ML: 9 INJECTION, SOLUTION INTRAVENOUS at 15:07

## 2019-01-01 RX ADMIN — Medication 10 ML: at 09:48

## 2019-01-01 RX ADMIN — MICONAZOLE NITRATE: 20 POWDER TOPICAL at 21:05

## 2019-01-01 RX ADMIN — MICONAZOLE NITRATE: 20 POWDER TOPICAL at 09:48

## 2019-01-01 RX ADMIN — CEFTRIAXONE SODIUM 1 G: 1 INJECTION, POWDER, FOR SOLUTION INTRAMUSCULAR; INTRAVENOUS at 18:24

## 2019-01-01 RX ADMIN — PANTOPRAZOLE SODIUM 40 MG: 40 TABLET, DELAYED RELEASE ORAL at 05:23

## 2019-01-01 RX ADMIN — ACETAMINOPHEN 650 MG: 325 TABLET ORAL at 11:51

## 2019-01-01 RX ADMIN — GABAPENTIN 300 MG: 300 CAPSULE ORAL at 10:02

## 2019-01-01 RX ADMIN — ENOXAPARIN SODIUM 40 MG: 40 INJECTION SUBCUTANEOUS at 09:47

## 2019-01-01 RX ADMIN — MECLIZINE 25 MG: 12.5 TABLET ORAL at 09:47

## 2019-01-01 RX ADMIN — GABAPENTIN 300 MG: 300 CAPSULE ORAL at 15:32

## 2019-01-01 RX ADMIN — METOPROLOL SUCCINATE 75 MG: 50 TABLET, EXTENDED RELEASE ORAL at 09:47

## 2019-01-01 RX ADMIN — PANTOPRAZOLE SODIUM 40 MG: 40 TABLET, DELAYED RELEASE ORAL at 05:44

## 2019-01-01 RX ADMIN — SODIUM CHLORIDE: 9 INJECTION, SOLUTION INTRAVENOUS at 12:52

## 2019-01-01 RX ADMIN — METOPROLOL SUCCINATE 75 MG: 50 TABLET, EXTENDED RELEASE ORAL at 10:02

## 2019-01-01 RX ADMIN — ATORVASTATIN CALCIUM 40 MG: 40 TABLET, FILM COATED ORAL at 10:02

## 2019-01-01 RX ADMIN — ACETAMINOPHEN 650 MG: 325 TABLET ORAL at 15:07

## 2019-01-01 RX ADMIN — GABAPENTIN 300 MG: 300 CAPSULE ORAL at 09:47

## 2019-01-01 RX ADMIN — SODIUM CHLORIDE: 9 INJECTION, SOLUTION INTRAVENOUS at 00:37

## 2019-01-01 ASSESSMENT — PAIN DESCRIPTION - ONSET: ONSET: GRADUAL

## 2019-01-01 ASSESSMENT — PAIN DESCRIPTION - LOCATION
LOCATION: HEAD
LOCATION: GENERALIZED
LOCATION: HEAD

## 2019-01-01 ASSESSMENT — PAIN DESCRIPTION - PROGRESSION: CLINICAL_PROGRESSION: GRADUALLY IMPROVING

## 2019-01-01 ASSESSMENT — PAIN DESCRIPTION - DESCRIPTORS: DESCRIPTORS: ACHING

## 2019-01-01 ASSESSMENT — PAIN SCALES - GENERAL
PAINLEVEL_OUTOF10: 5
PAINLEVEL_OUTOF10: 7
PAINLEVEL_OUTOF10: 5
PAINLEVEL_OUTOF10: 6
PAINLEVEL_OUTOF10: 7
PAINLEVEL_OUTOF10: 3

## 2019-01-01 ASSESSMENT — PAIN SCALES - WONG BAKER: WONGBAKER_NUMERICALRESPONSE: 6

## 2019-01-01 ASSESSMENT — PAIN - FUNCTIONAL ASSESSMENT: PAIN_FUNCTIONAL_ASSESSMENT: ACTIVITIES ARE NOT PREVENTED

## 2019-01-01 ASSESSMENT — PAIN DESCRIPTION - FREQUENCY: FREQUENCY: INTERMITTENT

## 2019-01-01 ASSESSMENT — PAIN DESCRIPTION - PAIN TYPE: TYPE: ACUTE PAIN

## 2019-03-11 ENCOUNTER — OFFICE VISIT (OUTPATIENT)
Dept: CARDIOLOGY CLINIC | Age: 79
End: 2019-03-11
Payer: MEDICARE

## 2019-03-11 ENCOUNTER — TELEPHONE (OUTPATIENT)
Dept: CARDIOLOGY CLINIC | Age: 79
End: 2019-03-11

## 2019-03-11 VITALS
DIASTOLIC BLOOD PRESSURE: 58 MMHG | WEIGHT: 186 LBS | HEIGHT: 58 IN | BODY MASS INDEX: 39.04 KG/M2 | HEART RATE: 63 BPM | OXYGEN SATURATION: 96 % | SYSTOLIC BLOOD PRESSURE: 88 MMHG

## 2019-03-11 DIAGNOSIS — I10 ESSENTIAL HYPERTENSION: ICD-10-CM

## 2019-03-11 DIAGNOSIS — I95.1 ORTHOSTATIC HYPOTENSION: Primary | ICD-10-CM

## 2019-03-11 PROBLEM — I49.5 SINUS NODE DYSFUNCTION (HCC): Status: ACTIVE | Noted: 2019-03-11

## 2019-03-11 PROCEDURE — 1090F PRES/ABSN URINE INCON ASSESS: CPT | Performed by: NURSE PRACTITIONER

## 2019-03-11 PROCEDURE — 1101F PT FALLS ASSESS-DOCD LE1/YR: CPT | Performed by: NURSE PRACTITIONER

## 2019-03-11 PROCEDURE — G8484 FLU IMMUNIZE NO ADMIN: HCPCS | Performed by: NURSE PRACTITIONER

## 2019-03-11 PROCEDURE — 1036F TOBACCO NON-USER: CPT | Performed by: NURSE PRACTITIONER

## 2019-03-11 PROCEDURE — G8400 PT W/DXA NO RESULTS DOC: HCPCS | Performed by: NURSE PRACTITIONER

## 2019-03-11 PROCEDURE — G8427 DOCREV CUR MEDS BY ELIG CLIN: HCPCS | Performed by: NURSE PRACTITIONER

## 2019-03-11 PROCEDURE — 4040F PNEUMOC VAC/ADMIN/RCVD: CPT | Performed by: NURSE PRACTITIONER

## 2019-03-11 PROCEDURE — G8417 CALC BMI ABV UP PARAM F/U: HCPCS | Performed by: NURSE PRACTITIONER

## 2019-03-11 PROCEDURE — 99213 OFFICE O/P EST LOW 20 MIN: CPT | Performed by: NURSE PRACTITIONER

## 2019-03-11 PROCEDURE — 1123F ACP DISCUSS/DSCN MKR DOCD: CPT | Performed by: NURSE PRACTITIONER

## 2019-03-11 RX ORDER — MECLIZINE HYDROCHLORIDE 25 MG/1
25 TABLET ORAL
COMMUNITY
End: 2019-05-24 | Stop reason: SDUPTHER

## 2019-03-12 RX ORDER — FUROSEMIDE 20 MG/1
20 TABLET ORAL DAILY
COMMUNITY
End: 2019-03-14 | Stop reason: ALTCHOICE

## 2019-05-24 ENCOUNTER — OFFICE VISIT (OUTPATIENT)
Dept: FAMILY MEDICINE CLINIC | Age: 79
End: 2019-05-24
Payer: MEDICARE

## 2019-05-24 VITALS
BODY MASS INDEX: 39.33 KG/M2 | WEIGHT: 188.2 LBS | RESPIRATION RATE: 16 BRPM | HEART RATE: 64 BPM | DIASTOLIC BLOOD PRESSURE: 78 MMHG | OXYGEN SATURATION: 94 % | SYSTOLIC BLOOD PRESSURE: 110 MMHG

## 2019-05-24 DIAGNOSIS — G25.81 RESTLESS LEGS: ICD-10-CM

## 2019-05-24 DIAGNOSIS — F33.0 MILD EPISODE OF RECURRENT MAJOR DEPRESSIVE DISORDER (HCC): ICD-10-CM

## 2019-05-24 DIAGNOSIS — Z00.00 HEALTHCARE MAINTENANCE: ICD-10-CM

## 2019-05-24 DIAGNOSIS — R42 LIGHT HEADEDNESS: Primary | ICD-10-CM

## 2019-05-24 DIAGNOSIS — M79.2 NERVE PAIN: ICD-10-CM

## 2019-05-24 DIAGNOSIS — K21.9 GASTROESOPHAGEAL REFLUX DISEASE WITHOUT ESOPHAGITIS: ICD-10-CM

## 2019-05-24 DIAGNOSIS — I10 ESSENTIAL HYPERTENSION: ICD-10-CM

## 2019-05-24 DIAGNOSIS — I49.5 SINUS NODE DYSFUNCTION (HCC): ICD-10-CM

## 2019-05-24 DIAGNOSIS — I95.1 ORTHOSTATIC HYPOTENSION: ICD-10-CM

## 2019-05-24 PROCEDURE — 1036F TOBACCO NON-USER: CPT | Performed by: FAMILY MEDICINE

## 2019-05-24 PROCEDURE — G8400 PT W/DXA NO RESULTS DOC: HCPCS | Performed by: FAMILY MEDICINE

## 2019-05-24 PROCEDURE — 1123F ACP DISCUSS/DSCN MKR DOCD: CPT | Performed by: FAMILY MEDICINE

## 2019-05-24 PROCEDURE — G8427 DOCREV CUR MEDS BY ELIG CLIN: HCPCS | Performed by: FAMILY MEDICINE

## 2019-05-24 PROCEDURE — 1090F PRES/ABSN URINE INCON ASSESS: CPT | Performed by: FAMILY MEDICINE

## 2019-05-24 PROCEDURE — 99214 OFFICE O/P EST MOD 30 MIN: CPT | Performed by: FAMILY MEDICINE

## 2019-05-24 PROCEDURE — 4040F PNEUMOC VAC/ADMIN/RCVD: CPT | Performed by: FAMILY MEDICINE

## 2019-05-24 PROCEDURE — G8417 CALC BMI ABV UP PARAM F/U: HCPCS | Performed by: FAMILY MEDICINE

## 2019-05-24 RX ORDER — AMLODIPINE BESYLATE 5 MG/1
5 TABLET ORAL DAILY
Qty: 90 TABLET | Refills: 3 | Status: SHIPPED | OUTPATIENT
Start: 2019-05-24 | End: 2019-06-13 | Stop reason: ALTCHOICE

## 2019-05-24 RX ORDER — MECLIZINE HYDROCHLORIDE 25 MG/1
25 TABLET ORAL DAILY
Qty: 90 TABLET | Refills: 3 | Status: SHIPPED | OUTPATIENT
Start: 2019-05-24 | End: 2019-08-15 | Stop reason: SDUPTHER

## 2019-05-24 RX ORDER — POTASSIUM CHLORIDE 750 MG/1
10 CAPSULE, EXTENDED RELEASE ORAL EVERY OTHER DAY
Status: ON HOLD | COMMUNITY
End: 2020-01-01 | Stop reason: HOSPADM

## 2019-05-24 RX ORDER — FUROSEMIDE 20 MG/1
20 TABLET ORAL DAILY
COMMUNITY
Start: 2019-03-28 | End: 2019-10-29 | Stop reason: SINTOL

## 2019-05-24 ASSESSMENT — PATIENT HEALTH QUESTIONNAIRE - PHQ9
SUM OF ALL RESPONSES TO PHQ QUESTIONS 1-9: 0
SUM OF ALL RESPONSES TO PHQ9 QUESTIONS 1 & 2: 0
2. FEELING DOWN, DEPRESSED OR HOPELESS: 0
1. LITTLE INTEREST OR PLEASURE IN DOING THINGS: 0
SUM OF ALL RESPONSES TO PHQ QUESTIONS 1-9: 0

## 2019-05-24 NOTE — PROGRESS NOTES
2019    Arielle Boss (:  1940) rafy 67 y.o. female, here for evaluation of the following:    CC: orthostatic hypotension    HPI    Sinus node dysfunction  On lasix, K replacement, seeing cardiology, assymptomatic,  s/p dual pacemaker implant in  with gen change in 2016 ([employer]) per cardiology note, imdur for chest pain but not currently having    Orthostatic hypotension  Takes antivert which helps, better than it has been, on 10mg of norvasc daily which could be contributing, also lasix and zoloft are associated with this    Restless legs  Controlled on requip    GERD  Controlled on prilosec    Depression  Still having, takes zoloft sporadically    HTN  Controlled, taking medications as prescribed    Nerve pain  Resolved, taking gabapentin    Health maint  Colonoscopy a year or so ago, was normal  Had DEXA which was normal  States she believes she is up to date on all shots      Review of Systems   HENT: Negative for hearing loss. Eyes: Negative for visual disturbance. Cardiovascular: Negative for chest pain. Neurological: Positive for light-headedness. Not currently having, but does have some days when getting out of bed     All other systems reviewed and negative    Prior to Visit Medications    Medication Sig Taking?  Authorizing Provider   furosemide (LASIX) 20 MG tablet Take 20 mg by mouth daily Indications: only takes a half daily  Yes Historical Provider, MD   potassium chloride (MICRO-K) 10 MEQ extended release capsule Take 10 mEq by mouth every other day Yes Historical Provider, MD   meclizine (ANTIVERT) 25 MG tablet Take 1 tablet by mouth daily Patient is only taking 1 daily Yes Dayla Better, MD   amLODIPine (NORVASC) 5 MG tablet Take 1 tablet by mouth daily Yes Dayla Better, MD   isosorbide mononitrate (IMDUR) 30 MG extended release tablet Take 1 tablet by mouth daily  Patient taking differently: Take 60 mg by mouth daily  Yes Celine Murray MD rOPINIRole (REQUIP) 0.5 MG tablet Take 0.5 mg by mouth nightly Yes Historical Provider, MD   sertraline (ZOLOFT) 100 MG tablet Take 100 mg by mouth daily Yes Historical Provider, MD   losartan-hydrochlorothiazide (HYZAAR) 100-25 MG per tablet Take 1 tablet by mouth daily Yes Historical Provider, MD   atorvastatin (LIPITOR) 40 MG tablet Take 40 mg by mouth daily Yes Historical Provider, MD   omeprazole (PRILOSEC) 20 MG delayed release capsule Take 40 mg by mouth daily  Historical Provider, MD        No Known Allergies    Past Medical History:   Diagnosis Date    Arthritis     CAD (coronary artery disease)     pacemaker/defib    History of blood transfusion     Hypertension     Orthostatic hypotension     Pacemaker        Past Surgical History:   Procedure Laterality Date    CARDIAC SURGERY      pacemaker, defib    HYSTERECTOMY      JOINT REPLACEMENT      right and left    KS EGD FLEX REMOVAL LESION(S) BY HOT BIOPSY FORCEPS  11/12/2018    ESOPHAGEAL DILATION Reza Laming #54 performed by Veto Bentley DO at [facility] ENDOSCOPY      erosive gastritis     UPPER GASTROINTESTINAL ENDOSCOPY N/A 11/12/2018    EGD BIOPSY performed by [facility]ley DO at [facility]. [facility] History     Socioeconomic History    Marital status:      Spouse name: Not on file    Number of children: Not on file    Years of education: Not on file    Highest education level: Not on file   Occupational History    Not on file   Social Needs    Financial resource strain: Not on file    Food insecurity:     Worry: Not on file     Inability: Not on file    Transportation needs:     Medical: Not on file     Non-medical: Not on file   Tobacco Use    Smoking status: Never Smoker    Smokeless tobacco: Never Used   Substance and Sexual Activity    Alcohol use: No    Drug use: No    Sexual activity: Not on file   Lifestyle    Physical activity:     Days per week: Not on file     Minutes per session: Not on file    Stress: Not on file   Relationships    Social connections:     Talks on phone: Not on file     Gets together: Not on file     Attends Yazdanism service: Not on file     Active member of club or organization: Not on file     Attends meetings of clubs or organizations: Not on file     Relationship status: Not on file    Intimate partner violence:     Fear of current or ex partner: Not on file     Emotionally abused: Not on file     Physically abused: Not on file     Forced sexual activity: Not on file   Other Topics Concern    Not on file   Social History Narrative    Not on file        No family history on file. Vitals:    05/24/19 0905   BP: 110/78   Pulse: 64   Resp: 16   SpO2: 94%   Weight: 188 lb 3.2 oz (85.4 kg)     Estimated body mass index is 39.33 kg/m² as calculated from the following:    Height as of 3/11/19: 4' 10\" (1.473 m). Weight as of this encounter: 188 lb 3.2 oz (85.4 kg). Physical Exam  Constitutional: Patient appears obese  Ears, Nose, Mouth & Throat: external inspection of ears and nose show no scars, lesions or masses, pt can hear finger rub bilaterally  Eyes: Conjunctivae and pupils are normal in appearance   Neck: neck is supple. No thyromegaly present   Cardiovascular: Normal rate. No lower ext edema present  Respiratory: Effort normal and breath sounds normal. No respiratory distress. No W/R/R   Gastrointestinal: Soft. There is no tenderness. No hernias  Musculoskeletal: Normal range of motion of extremities, gait: using cane to ambulate otherwise normal  Skin: Skin is warm and dry   Psychiatric: judgment and insight appropriate for age, no agitation    ASSESSMENT/PLAN:  Francy Cornell was seen today for establish care. Diagnoses and all orders for this visit:    Orthostatic hypotension  Pt has had documented orthostatic hypotension, norvasc is probably biggest contributor, will decrease to 5mg daily.  If this doesn't help will d/c norvasc and look for other possibilities. SSRI's have also been associated with orthostatic hypotension  -     meclizine (ANTIVERT) 25 MG tablet; Take 1 tablet by mouth daily Patient is only taking 1 daily    Essential hypertension  Controlled, decrease norvasc    -     amLODIPine (NORVASC) 5 MG tablet; Take 1 tablet by mouth daily    Sinus node dysfunction (HCC)  Defer to cardiology    Restless legs  Controlled, continue taking medications as prescribed    Gastroesophageal reflux disease without esophagitis  Controlled, continue taking medications as prescribed    Mild episode of recurrent major depressive disorder ([facility] [address]  Uncontrolled, advised to take zoloft everyday    Nerve pain  Resovled, ok to d/c gabapentin. If symptoms return she can restart    Healthcare maintenance  Pt to sign release so we can obtain records      Return in about 3 months (around 8/24/2019). An  electronic signature was used to authenticate this note.     --Matthieu Tolentino MD on 5/24/2019 at 10:13 AM

## 2019-06-05 DIAGNOSIS — R42 LIGHT HEADEDNESS: ICD-10-CM

## 2019-06-05 NOTE — TELEPHONE ENCOUNTER
Patient requesting a medication refill.     Medication   meclizine (ANTIVERT) 25 MG tablet      [facility] [phone]    Last office visit: 5/24/2019   Next office visit: Visit date not found

## 2019-06-13 ENCOUNTER — NURSE ONLY (OUTPATIENT)
Dept: CARDIOLOGY CLINIC | Age: 79
End: 2019-06-13
Payer: MEDICARE

## 2019-06-13 ENCOUNTER — OFFICE VISIT (OUTPATIENT)
Dept: CARDIOLOGY CLINIC | Age: 79
End: 2019-06-13
Payer: MEDICARE

## 2019-06-13 VITALS
BODY MASS INDEX: 39.5 KG/M2 | WEIGHT: 189 LBS | OXYGEN SATURATION: 94 % | DIASTOLIC BLOOD PRESSURE: 54 MMHG | HEART RATE: 66 BPM | SYSTOLIC BLOOD PRESSURE: 86 MMHG

## 2019-06-13 DIAGNOSIS — I49.5 SINUS NODE DYSFUNCTION (HCC): ICD-10-CM

## 2019-06-13 DIAGNOSIS — Z95.0 PACEMAKER: ICD-10-CM

## 2019-06-13 DIAGNOSIS — I10 ESSENTIAL HYPERTENSION: ICD-10-CM

## 2019-06-13 DIAGNOSIS — Z95.0 PACEMAKER: Primary | ICD-10-CM

## 2019-06-13 DIAGNOSIS — I95.1 ORTHOSTATIC HYPOTENSION: Primary | ICD-10-CM

## 2019-06-13 PROCEDURE — 1123F ACP DISCUSS/DSCN MKR DOCD: CPT | Performed by: NURSE PRACTITIONER

## 2019-06-13 PROCEDURE — G8427 DOCREV CUR MEDS BY ELIG CLIN: HCPCS | Performed by: NURSE PRACTITIONER

## 2019-06-13 PROCEDURE — 1090F PRES/ABSN URINE INCON ASSESS: CPT | Performed by: NURSE PRACTITIONER

## 2019-06-13 PROCEDURE — 4040F PNEUMOC VAC/ADMIN/RCVD: CPT | Performed by: NURSE PRACTITIONER

## 2019-06-13 PROCEDURE — G8417 CALC BMI ABV UP PARAM F/U: HCPCS | Performed by: NURSE PRACTITIONER

## 2019-06-13 PROCEDURE — 1036F TOBACCO NON-USER: CPT | Performed by: NURSE PRACTITIONER

## 2019-06-13 PROCEDURE — 99214 OFFICE O/P EST MOD 30 MIN: CPT | Performed by: NURSE PRACTITIONER

## 2019-06-13 PROCEDURE — G8400 PT W/DXA NO RESULTS DOC: HCPCS | Performed by: NURSE PRACTITIONER

## 2019-06-13 PROCEDURE — 93280 PM DEVICE PROGR EVAL DUAL: CPT | Performed by: INTERNAL MEDICINE

## 2019-06-13 NOTE — LETTER
[facility]   Electrophysiology Note              Date:  June 13, 2019  Patient name: Evangelist Aparicio  YOB: 1940    Primary Care physician: Rde Don MD    HISTORY OF PRESENT ILLNESS: The patient is a 67 y.o.  female with a history of sinus node dysfunction, HTN, orthostatic hypotension, and nonobstructive CAD. She had a dual chamber pacemaker implanted in 2007 (gen change 2016). LHC in 2015 showed nonobstructive CAD. Echo in 12/2016 showed an EF of 65-70%. In 12/2018 she transferred cardiac care from [facility] and Lasix was stopped due to orthostatic hypotension. She remained orthostatic in 3/2019 and amlodipine was stopped. Lasix was restarted due to swelling and amlodipine was restarted for unknown reasons. Today she is being seen for sinus node dysfunction and HTN. Device check shows normal function and brief PAT. Orthostatics are positive. She complains of dizziness and lower extremity swelling. Denies chest pain and palpitations. She is a poor historian and reports she is back on amlodipine and Lasix. Orthostatics today are:   /60 HR 60 (lying)  BP 90/58 HR 60 (sitting)  BP 86/54 HR 66 (standing)    Past Medical History:   has a past medical history of Arthritis, CAD (coronary artery disease), History of blood transfusion, Hypertension, Orthostatic hypotension, and Pacemaker. Past Surgical History:   has a past surgical history that includes joint replacement; Cardiac surgery; Hysterectomy; Upper gastrointestinal endoscopy; pr egd flex removal lesion(s) by hot biopsy forceps (11/12/2018); and Upper gastrointestinal endoscopy (N/A, 11/12/2018). Home Medications:    Prior to Admission medications    Medication Sig Start Date End Date Taking?  Authorizing Provider   furosemide (LASIX) 20 MG tablet Take 20 mg by mouth daily Indications: only takes a half daily  3/28/19  Yes Historical Provider, MD potassium chloride (MICRO-K) 10 MEQ extended release capsule Take 10 mEq by mouth every other day   Yes Historical Provider, MD   meclizine (ANTIVERT) 25 MG tablet Take 1 tablet by mouth daily Patient is only taking 1 daily 5/24/19  Yes Vanessa Estrella MD   amLODIPine (NORVASC) 5 MG tablet Take 1 tablet by mouth daily 5/24/19  Yes Vanessa Estrella MD   isosorbide mononitrate (IMDUR) 30 MG extended release tablet Take 1 tablet by mouth daily  Patient taking differently: Take 60 mg by mouth daily  12/3/18  Yes Tigist Rojas MD   rOPINIRole (REQUIP) 0.5 MG tablet Take 0.5 mg by mouth nightly   Yes Historical Provider, MD   sertraline (ZOLOFT) 100 MG tablet Take 100 mg by mouth daily   Yes Historical Provider, MD   omeprazole (PRILOSEC) 20 MG delayed release capsule Take 40 mg by mouth daily   Yes Historical Provider, MD   losartan-hydrochlorothiazide (HYZAAR) 100-25 MG per tablet Take 1 tablet by mouth daily   Yes Historical Provider, MD   atorvastatin (LIPITOR) 40 MG tablet Take 40 mg by mouth daily   Yes Historical Provider, MD       Allergies:  Patient has no known allergies. Social History:   reports that she has never smoked. She has never used smokeless tobacco. She reports that she does not drink alcohol or use drugs. Family History: family history is not on file. Review of Systems   Constitutional: Negative. HENT: Negative. Eyes: Negative. Respiratory: + chronic SOB   Cardiovascular: see HPI  Gastrointestinal: Negative. Genitourinary: Negative. Musculoskeletal: Negative. Skin: Negative. Neurological: + dizziness  Hematological: Negative. Psychiatric/Behavioral: Negative.     PHYSICAL EXAM:    Vital signs:    BP (!) 86/54 (Site: Right Upper Arm, Position: Standing)   Pulse 66   Wt 189 lb (85.7 kg)   SpO2 94%   BMI 39.50 kg/m²       Constitutional and general appearance: alert, cooperative, no distress and appears stated age -orthostatics positive today   4. Nonobstructive CAD: s/p LHC in 2015  5. Memory loss     Plan:   1. Continue Lasix, HCTZ, and losartan   2. Stop Imdur and amlodipine due to hypotension and dizziness  3. Will stop HCTZ if dizziness does not improve   4. Monitor BP at home and call if consistently out of goal ranges  5.  Follow up in 4 weeks     LAVELLE Bustillos-MAU  [address]  [phone]

## 2019-06-13 NOTE — PROGRESS NOTES
[facility]   Electrophysiology Note              Date:  June 13, 2019  Patient name: Arielle Boss  YOB: 1940    Primary Care physician: Maddison Munoz MD    HISTORY OF PRESENT ILLNESS: The patient is a 67 y.o.  female with a history of sinus node dysfunction, HTN, orthostatic hypotension, and nonobstructive CAD. She had a dual chamber pacemaker implanted in 2007 (gen change 2016). LHC in 2015 showed nonobstructive CAD. Echo in 12/2016 showed an EF of 65-70%. In 12/2018 she transferred cardiac care from [facility] and Lasix was stopped due to orthostatic hypotension. She remained orthostatic in 3/2019 and amlodipine was stopped. Lasix was restarted due to swelling and amlodipine was restarted for unknown reasons. Today she is being seen for sinus node dysfunction and HTN. Device check shows normal function and brief PAT. Orthostatics are positive. She complains of dizziness and lower extremity swelling. Denies chest pain and palpitations. She is a poor historian and reports she is back on amlodipine and Lasix. Orthostatics today are:   /60 HR 60 (lying)  BP 90/58 HR 60 (sitting)  BP 86/54 HR 66 (standing)    Past Medical History:   has a past medical history of Arthritis, CAD (coronary artery disease), History of blood transfusion, Hypertension, Orthostatic hypotension, and Pacemaker. Past Surgical History:   has a past surgical history that includes joint replacement; Cardiac surgery; Hysterectomy; Upper gastrointestinal endoscopy; pr egd flex removal lesion(s) by hot biopsy forceps (11/12/2018); and Upper gastrointestinal endoscopy (N/A, 11/12/2018). Home Medications:    Prior to Admission medications    Medication Sig Start Date End Date Taking?  Authorizing Provider   furosemide (LASIX) 20 MG tablet Take 20 mg by mouth daily Indications: only takes a half daily  3/28/19  Yes Historical Provider, MD   potassium chloride (MICRO-K) 10 MEQ extended release capsule Take 10 mEq by mouth every other day   Yes Historical Provider, MD   meclizine (ANTIVERT) 25 MG tablet Take 1 tablet by mouth daily Patient is only taking 1 daily 5/24/19  Yes Irish Ott MD   amLODIPine (NORVASC) 5 MG tablet Take 1 tablet by mouth daily 5/24/19  Yes Irish Ott MD   isosorbide mononitrate (IMDUR) 30 MG extended release tablet Take 1 tablet by mouth daily  Patient taking differently: Take 60 mg by mouth daily  12/3/18  Yes Marie Lal MD   rOPINIRole (REQUIP) 0.5 MG tablet Take 0.5 mg by mouth nightly   Yes Historical Provider, MD   sertraline (ZOLOFT) 100 MG tablet Take 100 mg by mouth daily   Yes Historical Provider, MD   omeprazole (PRILOSEC) 20 MG delayed release capsule Take 40 mg by mouth daily   Yes Historical Provider, MD   losartan-hydrochlorothiazide (HYZAAR) 100-25 MG per tablet Take 1 tablet by mouth daily   Yes Historical Provider, MD   atorvastatin (LIPITOR) 40 MG tablet Take 40 mg by mouth daily   Yes Historical Provider, MD       Allergies:  Patient has no known allergies. Social History:   reports that she has never smoked. She has never used smokeless tobacco. She reports that she does not drink alcohol or use drugs. Family History: family history is not on file. Review of Systems   Constitutional: Negative. HENT: Negative. Eyes: Negative. Respiratory: + chronic SOB   Cardiovascular: see HPI  Gastrointestinal: Negative. Genitourinary: Negative. Musculoskeletal: Negative. Skin: Negative. Neurological: + dizziness  Hematological: Negative. Psychiatric/Behavioral: Negative. PHYSICAL EXAM:    Vital signs:    BP (!) 86/54 (Site: Right Upper Arm, Position: Standing)   Pulse 66   Wt 189 lb (85.7 kg)   SpO2 94%   BMI 39.50 kg/m²      Constitutional and general appearance: alert, cooperative, no distress and appears stated age  HEENT: PERRL, no cervical lymphadenopathy. No masses palpable.  Normal oral Plan:   1. Continue Lasix, HCTZ, and losartan   2. Stop Imdur and amlodipine due to hypotension and dizziness  3. Will stop HCTZ if dizziness does not improve   4. Monitor BP at home and call if consistently out of goal ranges  5.  Follow up in 4 weeks     ELVIA Ruby  [facility]  [phone]

## 2019-07-18 ENCOUNTER — OFFICE VISIT (OUTPATIENT)
Dept: CARDIOLOGY CLINIC | Age: 79
End: 2019-07-18
Payer: MEDICARE

## 2019-07-18 VITALS
SYSTOLIC BLOOD PRESSURE: 84 MMHG | WEIGHT: 189 LBS | HEIGHT: 58 IN | BODY MASS INDEX: 39.67 KG/M2 | DIASTOLIC BLOOD PRESSURE: 56 MMHG | HEART RATE: 86 BPM

## 2019-07-18 DIAGNOSIS — I95.1 ORTHOSTATIC HYPOTENSION: Primary | ICD-10-CM

## 2019-07-18 DIAGNOSIS — I10 ESSENTIAL HYPERTENSION: ICD-10-CM

## 2019-07-18 DIAGNOSIS — I49.5 SINUS NODE DYSFUNCTION (HCC): ICD-10-CM

## 2019-07-18 PROCEDURE — 1036F TOBACCO NON-USER: CPT | Performed by: NURSE PRACTITIONER

## 2019-07-18 PROCEDURE — 1123F ACP DISCUSS/DSCN MKR DOCD: CPT | Performed by: NURSE PRACTITIONER

## 2019-07-18 PROCEDURE — G8400 PT W/DXA NO RESULTS DOC: HCPCS | Performed by: NURSE PRACTITIONER

## 2019-07-18 PROCEDURE — 99214 OFFICE O/P EST MOD 30 MIN: CPT | Performed by: NURSE PRACTITIONER

## 2019-07-18 PROCEDURE — G8417 CALC BMI ABV UP PARAM F/U: HCPCS | Performed by: NURSE PRACTITIONER

## 2019-07-18 PROCEDURE — 4040F PNEUMOC VAC/ADMIN/RCVD: CPT | Performed by: NURSE PRACTITIONER

## 2019-07-18 PROCEDURE — G8427 DOCREV CUR MEDS BY ELIG CLIN: HCPCS | Performed by: NURSE PRACTITIONER

## 2019-07-18 PROCEDURE — 1090F PRES/ABSN URINE INCON ASSESS: CPT | Performed by: NURSE PRACTITIONER

## 2019-07-18 RX ORDER — METOPROLOL SUCCINATE 50 MG/1
50 TABLET, EXTENDED RELEASE ORAL DAILY
Qty: 90 TABLET | Refills: 3 | Status: SHIPPED | OUTPATIENT
Start: 2019-07-18 | End: 2019-07-25 | Stop reason: SDUPTHER

## 2019-07-18 NOTE — PROGRESS NOTES
[facility]   Electrophysiology Note              Date:  July 18, 2019  Patient name: Joce Campbell  YOB: 1940    Primary Care physician: Carmen Falk MD    HISTORY OF PRESENT ILLNESS: The patient is a 67 y.o.  female with a history of sinus node dysfunction, HTN, orthostatic hypotension, and nonobstructive CAD. She had a dual chamber pacemaker implanted in 2007 (gen change 2016). LHC in 2015 showed nonobstructive CAD. Echo in 12/2016 showed an EF of 65-70%. In 12/2018 she transferred cardiac care from [facility] and Lasix was stopped due to orthostatic hypotension. She remained orthostatic in 3/2019 and amlodipine was stopped. Lasix was restarted due to swelling and amlodipine was restarted for unknown reasons. In 6/2019 she remained orthostatic and Imdur and amlodipine were stopped. Today she is being seen for orthostatic hypotension and sinus node dysfunction. Device check on 6/13/2019 showed normal function and brief PAT. Orthostatics are positive. She complains of occasional skipped beats, LE swelling, and some intermittent dizziness. Feeling better overall. Denies chest pain, SOB, and syncope. Orthostatics today are:   /72 HR 60 (lying)  BP 96/58 HR 66 (sitting)  BP 84/56 HR 86 (standing)      Past Medical History:   has a past medical history of Arthritis, CAD (coronary artery disease), History of blood transfusion, Hypertension, Orthostatic hypotension, and Pacemaker. Past Surgical History:   has a past surgical history that includes joint replacement; Cardiac surgery; Hysterectomy; Upper gastrointestinal endoscopy; pr egd flex removal lesion(s) by hot biopsy forceps (11/12/2018); and Upper gastrointestinal endoscopy (N/A, 11/12/2018). Home Medications:    Prior to Admission medications    Medication Sig Start Date End Date Taking?  Authorizing Provider   furosemide (LASIX) 20 MG tablet Take 20 mg by mouth daily Indications: only takes a half intake, and small frequent meals recommended  5.  Follow up in 6 weeks    LAVELLE Sutton-CNP  [facility]  [phone]

## 2019-07-23 ENCOUNTER — OFFICE VISIT (OUTPATIENT)
Dept: FAMILY MEDICINE CLINIC | Age: 79
End: 2019-07-23
Payer: MEDICARE

## 2019-07-23 VITALS
HEART RATE: 62 BPM | BODY MASS INDEX: 39.5 KG/M2 | OXYGEN SATURATION: 96 % | SYSTOLIC BLOOD PRESSURE: 130 MMHG | WEIGHT: 189 LBS | DIASTOLIC BLOOD PRESSURE: 80 MMHG

## 2019-07-23 DIAGNOSIS — Z91.81 AT HIGH RISK FOR FALLS: ICD-10-CM

## 2019-07-23 DIAGNOSIS — M79.2 NERVE PAIN: ICD-10-CM

## 2019-07-23 DIAGNOSIS — I10 ESSENTIAL HYPERTENSION: ICD-10-CM

## 2019-07-23 DIAGNOSIS — I10 ESSENTIAL HYPERTENSION: Primary | ICD-10-CM

## 2019-07-23 DIAGNOSIS — I95.1 ORTHOSTATIC HYPOTENSION: Primary | ICD-10-CM

## 2019-07-23 PROCEDURE — 1090F PRES/ABSN URINE INCON ASSESS: CPT | Performed by: FAMILY MEDICINE

## 2019-07-23 PROCEDURE — 1036F TOBACCO NON-USER: CPT | Performed by: FAMILY MEDICINE

## 2019-07-23 PROCEDURE — G8427 DOCREV CUR MEDS BY ELIG CLIN: HCPCS | Performed by: FAMILY MEDICINE

## 2019-07-23 PROCEDURE — 99214 OFFICE O/P EST MOD 30 MIN: CPT | Performed by: FAMILY MEDICINE

## 2019-07-23 PROCEDURE — G8417 CALC BMI ABV UP PARAM F/U: HCPCS | Performed by: FAMILY MEDICINE

## 2019-07-23 PROCEDURE — 1123F ACP DISCUSS/DSCN MKR DOCD: CPT | Performed by: FAMILY MEDICINE

## 2019-07-23 PROCEDURE — 4040F PNEUMOC VAC/ADMIN/RCVD: CPT | Performed by: FAMILY MEDICINE

## 2019-07-23 PROCEDURE — G8400 PT W/DXA NO RESULTS DOC: HCPCS | Performed by: FAMILY MEDICINE

## 2019-07-23 RX ORDER — GABAPENTIN 300 MG/1
300 CAPSULE ORAL 3 TIMES DAILY
COMMUNITY
End: 2019-10-24 | Stop reason: SDUPTHER

## 2019-07-23 RX ORDER — AMLODIPINE BESYLATE 10 MG/1
1 TABLET ORAL DAILY
COMMUNITY
Start: 2019-06-26 | End: 2019-07-23 | Stop reason: ALTCHOICE

## 2019-07-23 NOTE — PROGRESS NOTES
Right Upper Arm   Position: Sitting   Cuff Size: Large Adult   Pulse: 62   SpO2: 96%   Weight: 189 lb (85.7 kg)     Estimated body mass index is 39.5 kg/m² as calculated from the following:    Height as of 7/18/19: 4' 10\" (1.473 m). Weight as of this encounter: 189 lb (85.7 kg). Physical Exam  Constitutional: appears well-developed and well-nourished. No distress. HENT:   Head: Normocephalic and atraumatic   Eyes: EOM are normal. Right eye exhibits no discharge. Left eye exhibits no discharge   Pulmonary/Chest: Effort normal   Skin: Patient is not diaphoretic     ASSESSMENT/PLAN:  Laquita Gao was seen today for hypertension and medication check. Diagnoses and all orders for this visit:    Orthostatic hypotension  Resolved, norvasc and losartan-HCTZ stopped    Essential hypertension  Controlled, continue taking metoprolol as prescribed    Nerve pain  Controlled, continue gabapentin as prescribed    High risk for falls   On the basis of positive falls risk screening, assessment and plan is as follows: referral to physical therapy provided for strength and balance training.  - pt referral sent in    Return in about 3 months (around 10/23/2019). An electronic signature was used to authenticate this note.     --Carmen Philippe MD on 7/23/2019 at 11:22 AM

## 2019-07-24 NOTE — TELEPHONE ENCOUNTER
Guera Hagen, are you able to call [place]? Also, I changed the pharmacy from the specialty pharmacy if you would like to resend the script for metoprolol.

## 2019-07-24 NOTE — TELEPHONE ENCOUNTER
I sent the prescription to [facility] per request. Please clarify pharmacy and reason for needing my call.

## 2019-07-25 RX ORDER — METOPROLOL SUCCINATE 50 MG/1
50 TABLET, EXTENDED RELEASE ORAL DAILY
Qty: 90 TABLET | Refills: 3 | Status: SHIPPED | OUTPATIENT
Start: 2019-07-25 | End: 2019-08-29 | Stop reason: DRUGHIGH

## 2019-08-01 ENCOUNTER — TELEPHONE (OUTPATIENT)
Dept: CARDIOLOGY CLINIC | Age: 79
End: 2019-08-01

## 2019-08-01 DIAGNOSIS — R42 LIGHT HEADEDNESS: ICD-10-CM

## 2019-08-14 ENCOUNTER — TELEPHONE (OUTPATIENT)
Dept: FAMILY MEDICINE CLINIC | Age: 79
End: 2019-08-14

## 2019-08-14 RX ORDER — OMEPRAZOLE 20 MG/1
40 CAPSULE, DELAYED RELEASE ORAL DAILY
Status: CANCELLED | OUTPATIENT
Start: 2019-08-14

## 2019-08-14 RX ORDER — MECLIZINE HYDROCHLORIDE 25 MG/1
25 TABLET ORAL DAILY
Qty: 90 TABLET | Refills: 3 | Status: CANCELLED | OUTPATIENT
Start: 2019-08-14

## 2019-08-15 DIAGNOSIS — K21.9 GASTROESOPHAGEAL REFLUX DISEASE WITHOUT ESOPHAGITIS: Primary | ICD-10-CM

## 2019-08-15 DIAGNOSIS — I95.1 ORTHOSTATIC HYPOTENSION: ICD-10-CM

## 2019-08-15 DIAGNOSIS — R42 LIGHT HEADEDNESS: ICD-10-CM

## 2019-08-15 RX ORDER — OMEPRAZOLE 20 MG/1
20 CAPSULE, DELAYED RELEASE ORAL DAILY
Qty: 90 CAPSULE | Refills: 3 | Status: SHIPPED | OUTPATIENT
Start: 2019-08-15 | End: 2020-01-01

## 2019-08-15 RX ORDER — MECLIZINE HYDROCHLORIDE 25 MG/1
25 TABLET ORAL DAILY
Qty: 90 TABLET | Refills: 3 | Status: ON HOLD | OUTPATIENT
Start: 2019-08-15 | End: 2020-01-01 | Stop reason: HOSPADM

## 2019-08-29 ENCOUNTER — OFFICE VISIT (OUTPATIENT)
Dept: CARDIOLOGY CLINIC | Age: 79
End: 2019-08-29
Payer: MEDICARE

## 2019-08-29 ENCOUNTER — NURSE ONLY (OUTPATIENT)
Dept: CARDIOLOGY CLINIC | Age: 79
End: 2019-08-29
Payer: MEDICARE

## 2019-08-29 VITALS
HEIGHT: 58 IN | WEIGHT: 195 LBS | HEART RATE: 60 BPM | OXYGEN SATURATION: 95 % | BODY MASS INDEX: 40.93 KG/M2 | DIASTOLIC BLOOD PRESSURE: 70 MMHG | SYSTOLIC BLOOD PRESSURE: 120 MMHG

## 2019-08-29 DIAGNOSIS — Z95.0 PACEMAKER: ICD-10-CM

## 2019-08-29 DIAGNOSIS — I10 ESSENTIAL HYPERTENSION: ICD-10-CM

## 2019-08-29 DIAGNOSIS — I95.1 ORTHOSTATIC HYPOTENSION: ICD-10-CM

## 2019-08-29 DIAGNOSIS — I49.5 SINUS NODE DYSFUNCTION (HCC): Primary | ICD-10-CM

## 2019-08-29 DIAGNOSIS — I48.0 PAROXYSMAL ATRIAL FIBRILLATION (HCC): ICD-10-CM

## 2019-08-29 DIAGNOSIS — I49.5 SINUS NODE DYSFUNCTION (HCC): ICD-10-CM

## 2019-08-29 PROCEDURE — 1123F ACP DISCUSS/DSCN MKR DOCD: CPT | Performed by: NURSE PRACTITIONER

## 2019-08-29 PROCEDURE — G8427 DOCREV CUR MEDS BY ELIG CLIN: HCPCS | Performed by: NURSE PRACTITIONER

## 2019-08-29 PROCEDURE — G8417 CALC BMI ABV UP PARAM F/U: HCPCS | Performed by: NURSE PRACTITIONER

## 2019-08-29 PROCEDURE — 93280 PM DEVICE PROGR EVAL DUAL: CPT | Performed by: INTERNAL MEDICINE

## 2019-08-29 PROCEDURE — 4040F PNEUMOC VAC/ADMIN/RCVD: CPT | Performed by: NURSE PRACTITIONER

## 2019-08-29 PROCEDURE — 1090F PRES/ABSN URINE INCON ASSESS: CPT | Performed by: NURSE PRACTITIONER

## 2019-08-29 PROCEDURE — G8400 PT W/DXA NO RESULTS DOC: HCPCS | Performed by: NURSE PRACTITIONER

## 2019-08-29 PROCEDURE — 1036F TOBACCO NON-USER: CPT | Performed by: NURSE PRACTITIONER

## 2019-08-29 PROCEDURE — 99214 OFFICE O/P EST MOD 30 MIN: CPT | Performed by: NURSE PRACTITIONER

## 2019-08-29 RX ORDER — METOPROLOL SUCCINATE 50 MG/1
75 TABLET, EXTENDED RELEASE ORAL DAILY
Qty: 135 TABLET | Refills: 3 | Status: ON HOLD | OUTPATIENT
Start: 2019-08-29 | End: 2020-01-01 | Stop reason: HOSPADM

## 2019-08-29 NOTE — PROGRESS NOTES
[address]   Electrophysiology Note              Date:  August 29, 2019  Patient name: Eugene Aldrich  YOB: 1940    Primary Care physician: Leah Newell MD    HISTORY OF PRESENT ILLNESS: The patient is a 67 y.o.  female with a history of sinus node dysfunction, HTN, orthostatic hypotension, and nonobstructive CAD. She had a dual chamber pacemaker implanted in 2007 (gen change 2016). LHC in 2015 showed nonobstructive CAD. Echo in 12/2016 showed an EF of 65-70%. In 12/2018 she transferred cardiac care from [facility] and Lasix was stopped due to orthostatic hypotension. She remained orthostatic in 3/2019 and amlodipine was stopped. Lasix was restarted due to swelling and amlodipine was restarted for unknown reasons. In 6/2019 she remained orthostatic and Imdur and amlodipine were stopped. At her visit in 7/2019, losartan and HCTZ were stopped and Toprol was started for [place]. Today she is being seen for orthostatic hypotension and sinus node dysfunction. Device check shows normal function, 75% AP, 19% , and 3 minutes of PAF in 7/2019. Orthostatics are positive. She complains of occasional dizziness, but decreased episodes since medication changes last visit. She reports that she has been having abdominal pain and \"bloody stools\" for the past two weeks. Has not yet contacted PCP. She denies shortness of breath, palpitations, and chest pain. Orthostatics today are:   /74 HR 60 (lying)  /84 HR 60 (sitting)  /70 HR 60 (standing)    Past Medical History:   has a past medical history of Arthritis, CAD (coronary artery disease), History of blood transfusion, Hypertension, Orthostatic hypotension, and Pacemaker. Past Surgical History:   has a past surgical history that includes joint replacement; Cardiac surgery; Hysterectomy;  Upper gastrointestinal endoscopy; pr egd flex removal lesion(s) by hot biopsy forceps (11/12/2018); and Upper gastrointestinal

## 2019-09-03 ENCOUNTER — OFFICE VISIT (OUTPATIENT)
Dept: FAMILY MEDICINE CLINIC | Age: 79
End: 2019-09-03
Payer: MEDICARE

## 2019-09-03 VITALS
OXYGEN SATURATION: 96 % | TEMPERATURE: 97.7 F | BODY MASS INDEX: 40.34 KG/M2 | DIASTOLIC BLOOD PRESSURE: 70 MMHG | HEART RATE: 60 BPM | SYSTOLIC BLOOD PRESSURE: 130 MMHG | WEIGHT: 193 LBS

## 2019-09-03 DIAGNOSIS — K92.1 BLOOD IN STOOL: ICD-10-CM

## 2019-09-03 DIAGNOSIS — I10 HYPERTENSION, UNSPECIFIED TYPE: ICD-10-CM

## 2019-09-03 DIAGNOSIS — R31.9 HEMATURIA, UNSPECIFIED TYPE: Primary | ICD-10-CM

## 2019-09-03 PROCEDURE — 99214 OFFICE O/P EST MOD 30 MIN: CPT | Performed by: FAMILY MEDICINE

## 2019-09-03 PROCEDURE — 81002 URINALYSIS NONAUTO W/O SCOPE: CPT | Performed by: FAMILY MEDICINE

## 2019-09-03 PROCEDURE — 1090F PRES/ABSN URINE INCON ASSESS: CPT | Performed by: FAMILY MEDICINE

## 2019-09-03 PROCEDURE — G8417 CALC BMI ABV UP PARAM F/U: HCPCS | Performed by: FAMILY MEDICINE

## 2019-09-03 PROCEDURE — 1123F ACP DISCUSS/DSCN MKR DOCD: CPT | Performed by: FAMILY MEDICINE

## 2019-09-03 PROCEDURE — G8400 PT W/DXA NO RESULTS DOC: HCPCS | Performed by: FAMILY MEDICINE

## 2019-09-03 PROCEDURE — G8427 DOCREV CUR MEDS BY ELIG CLIN: HCPCS | Performed by: FAMILY MEDICINE

## 2019-09-03 PROCEDURE — 1036F TOBACCO NON-USER: CPT | Performed by: FAMILY MEDICINE

## 2019-09-03 PROCEDURE — 4040F PNEUMOC VAC/ADMIN/RCVD: CPT | Performed by: FAMILY MEDICINE

## 2019-09-03 RX ORDER — NITROFURANTOIN 25; 75 MG/1; MG/1
100 CAPSULE ORAL 2 TIMES DAILY
Qty: 10 CAPSULE | Refills: 0 | Status: SHIPPED | OUTPATIENT
Start: 2019-09-03 | End: 2019-09-08

## 2019-09-03 NOTE — PROGRESS NOTES
Use Topics    Alcohol use: No        Vitals:    09/03/19 1553   BP: 130/70   Site: Left Upper Arm   Position: Sitting   Cuff Size: Large Adult   Pulse: 60   Temp: 97.7 °F (36.5 °C)   TempSrc: Oral   SpO2: 96%   Weight: 193 lb (87.5 kg)     Estimated body mass index is 40.34 kg/m² as calculated from the following:    Height as of 8/29/19: 4' 10\" (1.473 m). Weight as of this encounter: 193 lb (87.5 kg). Physical Exam  Constitutional: appears obese  HENT:   Head: Normocephalic and atraumatic   Eyes: EOM are normal. Right eye exhibits no discharge. Left eye exhibits no discharge   Pulmonary/Chest: Effort normal   Skin: Patient is not diaphoretic     ASSESSMENT/PLAN:  Jethro Munguia was seen today for hematuria and rectal bleeding. Diagnoses and all orders for this visit:    Hematuria, unspecified type  Pt unable to give urine sample, based on subjective symptoms of dysruia and reported hematuria its likely pt has UTI however cannot say for sure. Sending in ABX and referring to urology  -     Urine Culture  -     POCT Urinalysis no Micro  -     Delorise MD Rebecca, [facility], [facility]  -     nitrofurantoin, macrocrystal-monohydrate, (MACROBID) 100 MG capsule; Take 1 capsule by mouth 2 times daily for 5 days    Blood in stool  uncontrolled  -     Anai Temple MD, Gastroenterology, [facility]    HTN  Controlled, continue taking medications as prescribed    Return in about 3 months (around 12/3/2019). An electronic signature was used to authenticate this note.     --Julane Gowers, MD on 9/3/2019 at 4:23 PM

## 2019-09-09 RX ORDER — GABAPENTIN 300 MG/1
300 CAPSULE ORAL 3 TIMES DAILY
Status: CANCELLED | OUTPATIENT
Start: 2019-09-09

## 2019-10-04 ENCOUNTER — HOSPITAL ENCOUNTER (EMERGENCY)
Age: 79
Discharge: HOME | End: 2019-10-04
Attending: EMERGENCY MEDICINE
Payer: MEDICARE

## 2019-10-04 ENCOUNTER — APPOINTMENT (OUTPATIENT)
Dept: CT IMAGING | Age: 79
End: 2019-10-04
Payer: MEDICARE

## 2019-10-04 VITALS
OXYGEN SATURATION: 96 % | BODY MASS INDEX: 40.13 KG/M2 | TEMPERATURE: 97 F | SYSTOLIC BLOOD PRESSURE: 159 MMHG | HEART RATE: 58 BPM | DIASTOLIC BLOOD PRESSURE: 80 MMHG | WEIGHT: 192 LBS | RESPIRATION RATE: 16 BRPM

## 2019-10-04 DIAGNOSIS — G44.59 OTHER COMPLICATED HEADACHE SYNDROME: Primary | ICD-10-CM

## 2019-10-04 LAB
ANION GAP SERPL CALCULATED.3IONS-SCNC: 12 MMOL/L (ref 3–16)
BASOPHILS ABSOLUTE: 0 K/UL (ref 0–0.2)
BASOPHILS RELATIVE PERCENT: 0.6 %
BUN BLDV-MCNC: 24 MG/DL (ref 7–20)
CALCIUM SERPL-MCNC: 9.7 MG/DL (ref 8.3–10.6)
CHLORIDE BLD-SCNC: 107 MMOL/L (ref 99–110)
CO2: 25 MMOL/L (ref 21–32)
CREAT SERPL-MCNC: 1 MG/DL (ref 0.6–1.2)
EOSINOPHILS ABSOLUTE: 0.2 K/UL (ref 0–0.6)
EOSINOPHILS RELATIVE PERCENT: 3.1 %
GFR AFRICAN AMERICAN: >60
GFR NON-AFRICAN AMERICAN: 54
GLUCOSE BLD-MCNC: 92 MG/DL (ref 70–99)
HCT VFR BLD CALC: 42.3 % (ref 36–48)
HEMOGLOBIN: 14.4 G/DL (ref 12–16)
LYMPHOCYTES ABSOLUTE: 1.6 K/UL (ref 1–5.1)
LYMPHOCYTES RELATIVE PERCENT: 27.7 %
MCH RBC QN AUTO: 32.7 PG (ref 26–34)
MCHC RBC AUTO-ENTMCNC: 34 G/DL (ref 31–36)
MCV RBC AUTO: 96.2 FL (ref 80–100)
MONOCYTES ABSOLUTE: 0.6 K/UL (ref 0–1.3)
MONOCYTES RELATIVE PERCENT: 10.8 %
NEUTROPHILS ABSOLUTE: 3.4 K/UL (ref 1.7–7.7)
NEUTROPHILS RELATIVE PERCENT: 57.8 %
PDW BLD-RTO: 12.4 % (ref 12.4–15.4)
PLATELET # BLD: 209 K/UL (ref 135–450)
PMV BLD AUTO: 9 FL (ref 5–10.5)
POTASSIUM SERPL-SCNC: 4.2 MMOL/L (ref 3.5–5.1)
RBC # BLD: 4.39 M/UL (ref 4–5.2)
SODIUM BLD-SCNC: 144 MMOL/L (ref 136–145)
SPECIMEN STATUS: NORMAL
TROPONIN: <0.01 NG/ML
WBC # BLD: 6 K/UL (ref 4–11)

## 2019-10-04 PROCEDURE — 99284 EMERGENCY DEPT VISIT MOD MDM: CPT

## 2019-10-04 PROCEDURE — 80048 BASIC METABOLIC PNL TOTAL CA: CPT

## 2019-10-04 PROCEDURE — 96374 THER/PROPH/DIAG INJ IV PUSH: CPT

## 2019-10-04 PROCEDURE — 93005 ELECTROCARDIOGRAM TRACING: CPT | Performed by: EMERGENCY MEDICINE

## 2019-10-04 PROCEDURE — 84484 ASSAY OF TROPONIN QUANT: CPT

## 2019-10-04 PROCEDURE — 85025 COMPLETE CBC W/AUTO DIFF WBC: CPT

## 2019-10-04 PROCEDURE — 6360000002 HC RX W HCPCS: Performed by: EMERGENCY MEDICINE

## 2019-10-04 PROCEDURE — 96375 TX/PRO/DX INJ NEW DRUG ADDON: CPT

## 2019-10-04 PROCEDURE — 70450 CT HEAD/BRAIN W/O DYE: CPT

## 2019-10-04 RX ORDER — DROPERIDOL 2.5 MG/ML
1.25 INJECTION, SOLUTION INTRAMUSCULAR; INTRAVENOUS ONCE
Status: COMPLETED | OUTPATIENT
Start: 2019-10-04 | End: 2019-10-04

## 2019-10-04 RX ORDER — DEXAMETHASONE 4 MG/1
2 TABLET ORAL 2 TIMES DAILY WITH MEALS
Qty: 3 TABLET | Refills: 0 | Status: SHIPPED | OUTPATIENT
Start: 2019-10-04 | End: 2019-10-07

## 2019-10-04 RX ORDER — DIPHENHYDRAMINE HYDROCHLORIDE 50 MG/ML
12.5 INJECTION INTRAMUSCULAR; INTRAVENOUS ONCE
Status: COMPLETED | OUTPATIENT
Start: 2019-10-04 | End: 2019-10-04

## 2019-10-04 RX ORDER — PROMETHAZINE HYDROCHLORIDE 12.5 MG/1
12.5 TABLET ORAL 4 TIMES DAILY PRN
Qty: 20 TABLET | Refills: 0 | Status: SHIPPED | OUTPATIENT
Start: 2019-10-04 | End: 2019-10-11

## 2019-10-04 RX ADMIN — HYDROMORPHONE HYDROCHLORIDE 0.5 MG: 1 INJECTION, SOLUTION INTRAMUSCULAR; INTRAVENOUS; SUBCUTANEOUS at 14:42

## 2019-10-04 RX ADMIN — DIPHENHYDRAMINE HYDROCHLORIDE 12.5 MG: 50 INJECTION, SOLUTION INTRAMUSCULAR; INTRAVENOUS at 14:42

## 2019-10-04 RX ADMIN — DROPERIDOL 1.25 MG: 2.5 INJECTION, SOLUTION INTRAMUSCULAR; INTRAVENOUS at 14:42

## 2019-10-04 ASSESSMENT — PAIN DESCRIPTION - PROGRESSION: CLINICAL_PROGRESSION: GRADUALLY IMPROVING

## 2019-10-04 ASSESSMENT — ENCOUNTER SYMPTOMS
BACK PAIN: 0
COLOR CHANGE: 0
EYE DISCHARGE: 0
SINUS PAIN: 0
SHORTNESS OF BREATH: 0
EYE REDNESS: 0
ABDOMINAL PAIN: 1
BLOOD IN STOOL: 1

## 2019-10-04 ASSESSMENT — PAIN SCALES - GENERAL
PAINLEVEL_OUTOF10: 8
PAINLEVEL_OUTOF10: 5
PAINLEVEL_OUTOF10: 8

## 2019-10-05 LAB
EKG ATRIAL RATE: 64 BPM
EKG DIAGNOSIS: NORMAL
EKG P AXIS: 7 DEGREES
EKG P-R INTERVAL: 188 MS
EKG Q-T INTERVAL: 420 MS
EKG QRS DURATION: 94 MS
EKG QTC CALCULATION (BAZETT): 433 MS
EKG R AXIS: -26 DEGREES
EKG T AXIS: 15 DEGREES
EKG VENTRICULAR RATE: 64 BPM

## 2019-10-05 PROCEDURE — 93010 ELECTROCARDIOGRAM REPORT: CPT | Performed by: INTERNAL MEDICINE

## 2019-10-07 ENCOUNTER — OFFICE VISIT (OUTPATIENT)
Dept: GASTROENTEROLOGY | Age: 79
End: 2019-10-07
Payer: MEDICARE

## 2019-10-07 VITALS
SYSTOLIC BLOOD PRESSURE: 110 MMHG | WEIGHT: 193 LBS | HEIGHT: 58 IN | BODY MASS INDEX: 40.51 KG/M2 | DIASTOLIC BLOOD PRESSURE: 70 MMHG

## 2019-10-07 DIAGNOSIS — K62.5 RECTAL BLEEDING: Primary | ICD-10-CM

## 2019-10-07 PROCEDURE — 4040F PNEUMOC VAC/ADMIN/RCVD: CPT | Performed by: INTERNAL MEDICINE

## 2019-10-07 PROCEDURE — G8427 DOCREV CUR MEDS BY ELIG CLIN: HCPCS | Performed by: INTERNAL MEDICINE

## 2019-10-07 PROCEDURE — G8417 CALC BMI ABV UP PARAM F/U: HCPCS | Performed by: INTERNAL MEDICINE

## 2019-10-07 PROCEDURE — 1090F PRES/ABSN URINE INCON ASSESS: CPT | Performed by: INTERNAL MEDICINE

## 2019-10-07 PROCEDURE — 1123F ACP DISCUSS/DSCN MKR DOCD: CPT | Performed by: INTERNAL MEDICINE

## 2019-10-07 PROCEDURE — G8484 FLU IMMUNIZE NO ADMIN: HCPCS | Performed by: INTERNAL MEDICINE

## 2019-10-07 PROCEDURE — 99204 OFFICE O/P NEW MOD 45 MIN: CPT | Performed by: INTERNAL MEDICINE

## 2019-10-07 PROCEDURE — G8400 PT W/DXA NO RESULTS DOC: HCPCS | Performed by: INTERNAL MEDICINE

## 2019-10-07 PROCEDURE — 1036F TOBACCO NON-USER: CPT | Performed by: INTERNAL MEDICINE

## 2019-10-07 RX ORDER — POLYETHYLENE GLYCOL 3350 17 G/17G
238 POWDER ORAL ONCE
Qty: 238 G | Refills: 0 | Status: SHIPPED | OUTPATIENT
Start: 2019-10-07 | End: 2019-01-01 | Stop reason: SDUPTHER

## 2019-10-10 DIAGNOSIS — M79.2 NERVE PAIN: ICD-10-CM

## 2019-10-10 RX ORDER — GABAPENTIN 300 MG/1
300 CAPSULE ORAL 3 TIMES DAILY
Qty: 90 CAPSULE | Refills: 0 | Status: CANCELLED | OUTPATIENT
Start: 2019-10-10

## 2019-10-24 DIAGNOSIS — M79.2 NERVE PAIN: ICD-10-CM

## 2019-10-24 RX ORDER — GABAPENTIN 300 MG/1
300 CAPSULE ORAL 3 TIMES DAILY
Qty: 270 CAPSULE | Refills: 0 | Status: ON HOLD | OUTPATIENT
Start: 2019-10-24 | End: 2019-01-01 | Stop reason: SDUPTHER

## 2019-10-29 ENCOUNTER — NURSE ONLY (OUTPATIENT)
Dept: CARDIOLOGY CLINIC | Age: 79
End: 2019-10-29
Payer: MEDICARE

## 2019-10-29 ENCOUNTER — OFFICE VISIT (OUTPATIENT)
Dept: CARDIOLOGY CLINIC | Age: 79
End: 2019-10-29
Payer: MEDICARE

## 2019-10-29 VITALS
OXYGEN SATURATION: 91 % | WEIGHT: 194 LBS | HEART RATE: 60 BPM | DIASTOLIC BLOOD PRESSURE: 80 MMHG | HEIGHT: 58 IN | BODY MASS INDEX: 40.72 KG/M2 | SYSTOLIC BLOOD PRESSURE: 130 MMHG

## 2019-10-29 DIAGNOSIS — I49.5 SINUS NODE DYSFUNCTION (HCC): ICD-10-CM

## 2019-10-29 DIAGNOSIS — I48.0 PAROXYSMAL ATRIAL FIBRILLATION (HCC): Primary | ICD-10-CM

## 2019-10-29 DIAGNOSIS — Z95.0 PACEMAKER: ICD-10-CM

## 2019-10-29 DIAGNOSIS — I48.0 PAROXYSMAL ATRIAL FIBRILLATION (HCC): ICD-10-CM

## 2019-10-29 DIAGNOSIS — I10 ESSENTIAL HYPERTENSION: ICD-10-CM

## 2019-10-29 DIAGNOSIS — I95.1 ORTHOSTATIC HYPOTENSION: ICD-10-CM

## 2019-10-29 PROCEDURE — 4040F PNEUMOC VAC/ADMIN/RCVD: CPT | Performed by: NURSE PRACTITIONER

## 2019-10-29 PROCEDURE — 1036F TOBACCO NON-USER: CPT | Performed by: NURSE PRACTITIONER

## 2019-10-29 PROCEDURE — 93280 PM DEVICE PROGR EVAL DUAL: CPT | Performed by: INTERNAL MEDICINE

## 2019-10-29 PROCEDURE — 1123F ACP DISCUSS/DSCN MKR DOCD: CPT | Performed by: NURSE PRACTITIONER

## 2019-10-29 PROCEDURE — G8427 DOCREV CUR MEDS BY ELIG CLIN: HCPCS | Performed by: NURSE PRACTITIONER

## 2019-10-29 PROCEDURE — 1090F PRES/ABSN URINE INCON ASSESS: CPT | Performed by: NURSE PRACTITIONER

## 2019-10-29 PROCEDURE — G8484 FLU IMMUNIZE NO ADMIN: HCPCS | Performed by: NURSE PRACTITIONER

## 2019-10-29 PROCEDURE — G8400 PT W/DXA NO RESULTS DOC: HCPCS | Performed by: NURSE PRACTITIONER

## 2019-10-29 PROCEDURE — G8417 CALC BMI ABV UP PARAM F/U: HCPCS | Performed by: NURSE PRACTITIONER

## 2019-10-29 PROCEDURE — 99214 OFFICE O/P EST MOD 30 MIN: CPT | Performed by: NURSE PRACTITIONER

## 2019-12-09 PROBLEM — R06.02 SHORTNESS OF BREATH: Status: ACTIVE | Noted: 2019-01-01

## 2019-12-12 PROBLEM — R53.1 WEAKNESS GENERALIZED: Status: ACTIVE | Noted: 2019-01-01

## 2019-12-13 PROBLEM — I25.10 CAD (CORONARY ARTERY DISEASE): Status: ACTIVE | Noted: 2019-01-01

## 2020-01-01 ENCOUNTER — APPOINTMENT (OUTPATIENT)
Dept: CT IMAGING | Age: 80
End: 2020-01-01
Payer: MEDICARE

## 2020-01-01 ENCOUNTER — APPOINTMENT (OUTPATIENT)
Dept: GENERAL RADIOLOGY | Age: 80
DRG: 871 | End: 2020-01-01
Payer: MEDICARE

## 2020-01-01 ENCOUNTER — ANESTHESIA (OUTPATIENT)
Dept: INPATIENT UNIT | Age: 80
DRG: 871 | End: 2020-01-01
Payer: MEDICARE

## 2020-01-01 ENCOUNTER — APPOINTMENT (OUTPATIENT)
Dept: VASCULAR LAB | Age: 80
DRG: 871 | End: 2020-01-01
Payer: MEDICARE

## 2020-01-01 ENCOUNTER — APPOINTMENT (OUTPATIENT)
Dept: CT IMAGING | Age: 80
DRG: 871 | End: 2020-01-01
Payer: MEDICARE

## 2020-01-01 ENCOUNTER — HOSPITAL ENCOUNTER (OUTPATIENT)
Age: 80
Setting detail: OBSERVATION
Discharge: HOME | End: 2020-10-08
Attending: EMERGENCY MEDICINE | Admitting: INTERNAL MEDICINE
Payer: MEDICARE

## 2020-01-01 ENCOUNTER — ANESTHESIA EVENT (OUTPATIENT)
Dept: INPATIENT UNIT | Age: 80
DRG: 871 | End: 2020-01-01
Payer: MEDICARE

## 2020-01-01 ENCOUNTER — APPOINTMENT (OUTPATIENT)
Dept: NUCLEAR MEDICINE | Age: 80
DRG: 871 | End: 2020-01-01
Payer: MEDICARE

## 2020-01-01 ENCOUNTER — APPOINTMENT (OUTPATIENT)
Dept: GENERAL RADIOLOGY | Age: 80
End: 2020-01-01
Payer: MEDICARE

## 2020-01-01 ENCOUNTER — HOSPITAL ENCOUNTER (INPATIENT)
Age: 80
LOS: 8 days | Discharge: HOSPICE | DRG: 871 | End: 2020-11-25
Attending: EMERGENCY MEDICINE | Admitting: INTERNAL MEDICINE
Payer: MEDICARE

## 2020-01-01 ENCOUNTER — TELEPHONE (OUTPATIENT)
Dept: FAMILY MEDICINE CLINIC | Age: 80
End: 2020-01-01

## 2020-01-01 VITALS
HEIGHT: 58 IN | HEART RATE: 60 BPM | TEMPERATURE: 97.4 F | WEIGHT: 200 LBS | SYSTOLIC BLOOD PRESSURE: 110 MMHG | BODY MASS INDEX: 41.98 KG/M2 | RESPIRATION RATE: 17 BRPM | DIASTOLIC BLOOD PRESSURE: 52 MMHG | OXYGEN SATURATION: 92 %

## 2020-01-01 VITALS
SYSTOLIC BLOOD PRESSURE: 170 MMHG | OXYGEN SATURATION: 99 % | DIASTOLIC BLOOD PRESSURE: 81 MMHG | HEART RATE: 61 BPM | BODY MASS INDEX: 39 KG/M2 | HEIGHT: 58 IN | WEIGHT: 185.8 LBS | RESPIRATION RATE: 20 BRPM | TEMPERATURE: 98.4 F

## 2020-01-01 LAB
A/G RATIO: 0.6 (ref 1.1–2.2)
A/G RATIO: 0.9 (ref 1.1–2.2)
A/G RATIO: 1.4 (ref 1.1–2.2)
ALBUMIN SERPL-MCNC: 2.2 G/DL (ref 3.4–5)
ALBUMIN SERPL-MCNC: 2.2 G/DL (ref 3.4–5)
ALBUMIN SERPL-MCNC: 2.3 G/DL (ref 3.4–5)
ALBUMIN SERPL-MCNC: 2.4 G/DL (ref 3.4–5)
ALBUMIN SERPL-MCNC: 2.6 G/DL (ref 3.4–5)
ALBUMIN SERPL-MCNC: 2.7 G/DL (ref 3.4–5)
ALBUMIN SERPL-MCNC: 2.9 G/DL (ref 3.4–5)
ALBUMIN SERPL-MCNC: 2.9 G/DL (ref 3.4–5)
ALBUMIN SERPL-MCNC: 3.7 G/DL (ref 3.4–5)
ALBUMIN SERPL-MCNC: 4 G/DL (ref 3.4–5)
ALP BLD-CCNC: 76 U/L (ref 40–129)
ALP BLD-CCNC: 80 U/L (ref 40–129)
ALP BLD-CCNC: 85 U/L (ref 40–129)
ALP BLD-CCNC: 95 U/L (ref 40–129)
ALT SERPL-CCNC: 10 U/L (ref 10–40)
ALT SERPL-CCNC: 10 U/L (ref 10–40)
ALT SERPL-CCNC: 8 U/L (ref 10–40)
ALT SERPL-CCNC: 9 U/L (ref 10–40)
AMMONIA: 30 UMOL/L (ref 11–51)
AMPHETAMINE SCREEN, URINE: NORMAL
ANION GAP SERPL CALCULATED.3IONS-SCNC: 11 MMOL/L (ref 3–16)
ANION GAP SERPL CALCULATED.3IONS-SCNC: 12 MMOL/L (ref 3–16)
ANION GAP SERPL CALCULATED.3IONS-SCNC: 13 MMOL/L (ref 3–16)
ANION GAP SERPL CALCULATED.3IONS-SCNC: 17 MMOL/L (ref 3–16)
ANION GAP SERPL CALCULATED.3IONS-SCNC: 22 MMOL/L (ref 3–16)
ANION GAP SERPL CALCULATED.3IONS-SCNC: 7 MMOL/L (ref 3–16)
ANION GAP SERPL CALCULATED.3IONS-SCNC: 9 MMOL/L (ref 3–16)
ANION GAP SERPL CALCULATED.3IONS-SCNC: 9 MMOL/L (ref 3–16)
APTT: 113.7 SEC (ref 24.2–36.2)
APTT: 29 SEC (ref 24.2–36.2)
APTT: 58.3 SEC (ref 24.2–36.2)
APTT: 68 SEC (ref 24.2–36.2)
AST SERPL-CCNC: 18 U/L (ref 15–37)
AST SERPL-CCNC: 18 U/L (ref 15–37)
AST SERPL-CCNC: 20 U/L (ref 15–37)
AST SERPL-CCNC: 23 U/L (ref 15–37)
ATYPICAL LYMPHOCYTE RELATIVE PERCENT: 5 % (ref 0–6)
BACTERIA: ABNORMAL /HPF
BANDED NEUTROPHILS RELATIVE PERCENT: 3 % (ref 0–7)
BANDED NEUTROPHILS RELATIVE PERCENT: 3 % (ref 0–7)
BANDED NEUTROPHILS RELATIVE PERCENT: 5 % (ref 0–7)
BANDED NEUTROPHILS RELATIVE PERCENT: 5 % (ref 0–7)
BARBITURATE SCREEN URINE: NORMAL
BASE EXCESS ARTERIAL: -1.7 MMOL/L (ref -3–3)
BASE EXCESS VENOUS: -6.2 MMOL/L (ref -3–3)
BASOPHILS ABSOLUTE: 0 K/UL (ref 0–0.2)
BASOPHILS ABSOLUTE: 0.1 K/UL (ref 0–0.2)
BASOPHILS RELATIVE PERCENT: 0 %
BASOPHILS RELATIVE PERCENT: 0.1 %
BASOPHILS RELATIVE PERCENT: 0.2 %
BASOPHILS RELATIVE PERCENT: 0.2 %
BASOPHILS RELATIVE PERCENT: 0.3 %
BASOPHILS RELATIVE PERCENT: 0.4 %
BASOPHILS RELATIVE PERCENT: 0.8 %
BENZODIAZEPINE SCREEN, URINE: NORMAL
BILIRUB SERPL-MCNC: 0.4 MG/DL (ref 0–1)
BILIRUB SERPL-MCNC: 0.6 MG/DL (ref 0–1)
BILIRUB SERPL-MCNC: 0.7 MG/DL (ref 0–1)
BILIRUB SERPL-MCNC: <0.2 MG/DL (ref 0–1)
BILIRUBIN DIRECT: <0.2 MG/DL (ref 0–0.3)
BILIRUBIN URINE: NEGATIVE
BILIRUBIN, INDIRECT: ABNORMAL MG/DL (ref 0–1)
BLOOD CULTURE, ROUTINE: NORMAL
BLOOD, URINE: ABNORMAL
BUN BLDV-MCNC: 103 MG/DL (ref 7–20)
BUN BLDV-MCNC: 124 MG/DL (ref 7–20)
BUN BLDV-MCNC: 155 MG/DL (ref 7–20)
BUN BLDV-MCNC: 157 MG/DL (ref 7–20)
BUN BLDV-MCNC: 17 MG/DL (ref 7–20)
BUN BLDV-MCNC: 181 MG/DL (ref 7–20)
BUN BLDV-MCNC: 59 MG/DL (ref 7–20)
BUN BLDV-MCNC: 68 MG/DL (ref 7–20)
BUN BLDV-MCNC: 72 MG/DL (ref 7–20)
BUN BLDV-MCNC: 73 MG/DL (ref 7–20)
BUN BLDV-MCNC: 89 MG/DL (ref 7–20)
C-REACTIVE PROTEIN: 203.5 MG/L (ref 0–5.1)
CALCIUM SERPL-MCNC: 10.5 MG/DL (ref 8.3–10.6)
CALCIUM SERPL-MCNC: 8 MG/DL (ref 8.3–10.6)
CALCIUM SERPL-MCNC: 8.8 MG/DL (ref 8.3–10.6)
CALCIUM SERPL-MCNC: 8.8 MG/DL (ref 8.3–10.6)
CALCIUM SERPL-MCNC: 8.9 MG/DL (ref 8.3–10.6)
CALCIUM SERPL-MCNC: 9 MG/DL (ref 8.3–10.6)
CALCIUM SERPL-MCNC: 9.1 MG/DL (ref 8.3–10.6)
CALCIUM SERPL-MCNC: 9.4 MG/DL (ref 8.3–10.6)
CALCIUM SERPL-MCNC: 9.5 MG/DL (ref 8.3–10.6)
CANNABINOID SCREEN URINE: NORMAL
CARBOXYHEMOGLOBIN ARTERIAL: 0.4 % (ref 0–1.5)
CARBOXYHEMOGLOBIN: 1.8 % (ref 0–1.5)
CHLORIDE BLD-SCNC: 101 MMOL/L (ref 99–110)
CHLORIDE BLD-SCNC: 107 MMOL/L (ref 99–110)
CHLORIDE BLD-SCNC: 109 MMOL/L (ref 99–110)
CHLORIDE BLD-SCNC: 110 MMOL/L (ref 99–110)
CHLORIDE BLD-SCNC: 113 MMOL/L (ref 99–110)
CHLORIDE BLD-SCNC: 115 MMOL/L (ref 99–110)
CHLORIDE BLD-SCNC: 115 MMOL/L (ref 99–110)
CHLORIDE BLD-SCNC: 117 MMOL/L (ref 99–110)
CHLORIDE BLD-SCNC: 117 MMOL/L (ref 99–110)
CHLORIDE BLD-SCNC: 118 MMOL/L (ref 99–110)
CHLORIDE BLD-SCNC: 123 MMOL/L (ref 99–110)
CLARITY: ABNORMAL
CO2: 15 MMOL/L (ref 21–32)
CO2: 20 MMOL/L (ref 21–32)
CO2: 21 MMOL/L (ref 21–32)
CO2: 23 MMOL/L (ref 21–32)
CO2: 23 MMOL/L (ref 21–32)
CO2: 24 MMOL/L (ref 21–32)
CO2: 24 MMOL/L (ref 21–32)
CO2: 25 MMOL/L (ref 21–32)
CO2: 30 MMOL/L (ref 21–32)
COCAINE METABOLITE SCREEN URINE: NORMAL
COLOR: YELLOW
COMMENT UA: ABNORMAL
CREAT SERPL-MCNC: 0.8 MG/DL (ref 0.6–1.2)
CREAT SERPL-MCNC: 1.1 MG/DL (ref 0.6–1.2)
CREAT SERPL-MCNC: 1.2 MG/DL (ref 0.6–1.2)
CREAT SERPL-MCNC: 1.4 MG/DL (ref 0.6–1.2)
CREAT SERPL-MCNC: 1.7 MG/DL (ref 0.6–1.2)
CREAT SERPL-MCNC: 2.1 MG/DL (ref 0.6–1.2)
CREAT SERPL-MCNC: 2.2 MG/DL (ref 0.6–1.2)
CREAT SERPL-MCNC: 3.2 MG/DL (ref 0.6–1.2)
CREAT SERPL-MCNC: 4.4 MG/DL (ref 0.6–1.2)
CREAT SERPL-MCNC: 5 MG/DL (ref 0.6–1.2)
CREAT SERPL-MCNC: 5.8 MG/DL (ref 0.6–1.2)
CULTURE, BLOOD 2: NORMAL
D DIMER: 1016 NG/ML DDU (ref 0–229)
D DIMER: 1557 NG/ML DDU (ref 0–229)
EKG ATRIAL RATE: 101 BPM
EKG ATRIAL RATE: 60 BPM
EKG DIAGNOSIS: NORMAL
EKG DIAGNOSIS: NORMAL
EKG P AXIS: 16 DEGREES
EKG P AXIS: 50 DEGREES
EKG P-R INTERVAL: 130 MS
EKG P-R INTERVAL: 146 MS
EKG Q-T INTERVAL: 384 MS
EKG Q-T INTERVAL: 416 MS
EKG QRS DURATION: 96 MS
EKG QRS DURATION: 98 MS
EKG QTC CALCULATION (BAZETT): 416 MS
EKG QTC CALCULATION (BAZETT): 497 MS
EKG R AXIS: -16 DEGREES
EKG R AXIS: -54 DEGREES
EKG T AXIS: 120 DEGREES
EKG T AXIS: 91 DEGREES
EKG VENTRICULAR RATE: 101 BPM
EKG VENTRICULAR RATE: 60 BPM
EOSINOPHILS ABSOLUTE: 0 K/UL (ref 0–0.6)
EOSINOPHILS ABSOLUTE: 0.1 K/UL (ref 0–0.6)
EOSINOPHILS RELATIVE PERCENT: 0 %
EOSINOPHILS RELATIVE PERCENT: 0.6 %
ETHANOL: NORMAL MG/DL (ref 0–0.08)
FERRITIN: 729.6 NG/ML (ref 15–150)
GFR AFRICAN AMERICAN: 10
GFR AFRICAN AMERICAN: 12
GFR AFRICAN AMERICAN: 17
GFR AFRICAN AMERICAN: 26
GFR AFRICAN AMERICAN: 27
GFR AFRICAN AMERICAN: 35
GFR AFRICAN AMERICAN: 44
GFR AFRICAN AMERICAN: 52
GFR AFRICAN AMERICAN: 58
GFR AFRICAN AMERICAN: 8
GFR AFRICAN AMERICAN: >60
GFR NON-AFRICAN AMERICAN: 10
GFR NON-AFRICAN AMERICAN: 14
GFR NON-AFRICAN AMERICAN: 21
GFR NON-AFRICAN AMERICAN: 23
GFR NON-AFRICAN AMERICAN: 29
GFR NON-AFRICAN AMERICAN: 36
GFR NON-AFRICAN AMERICAN: 43
GFR NON-AFRICAN AMERICAN: 48
GFR NON-AFRICAN AMERICAN: 7
GFR NON-AFRICAN AMERICAN: 8
GFR NON-AFRICAN AMERICAN: >60
GLOBULIN: 2.9 G/DL
GLOBULIN: 4.1 G/DL
GLOBULIN: 4.3 G/DL
GLUCOSE BLD-MCNC: 100 MG/DL (ref 70–99)
GLUCOSE BLD-MCNC: 100 MG/DL (ref 70–99)
GLUCOSE BLD-MCNC: 101 MG/DL (ref 70–99)
GLUCOSE BLD-MCNC: 101 MG/DL (ref 70–99)
GLUCOSE BLD-MCNC: 102 MG/DL (ref 70–99)
GLUCOSE BLD-MCNC: 107 MG/DL (ref 70–99)
GLUCOSE BLD-MCNC: 110 MG/DL (ref 70–99)
GLUCOSE BLD-MCNC: 112 MG/DL (ref 70–99)
GLUCOSE BLD-MCNC: 113 MG/DL (ref 70–99)
GLUCOSE BLD-MCNC: 116 MG/DL (ref 70–99)
GLUCOSE BLD-MCNC: 118 MG/DL (ref 70–99)
GLUCOSE BLD-MCNC: 119 MG/DL (ref 70–99)
GLUCOSE BLD-MCNC: 120 MG/DL (ref 70–99)
GLUCOSE BLD-MCNC: 121 MG/DL (ref 70–99)
GLUCOSE BLD-MCNC: 121 MG/DL (ref 70–99)
GLUCOSE BLD-MCNC: 122 MG/DL (ref 70–99)
GLUCOSE BLD-MCNC: 122 MG/DL (ref 70–99)
GLUCOSE BLD-MCNC: 124 MG/DL (ref 70–99)
GLUCOSE BLD-MCNC: 126 MG/DL (ref 70–99)
GLUCOSE BLD-MCNC: 126 MG/DL (ref 70–99)
GLUCOSE BLD-MCNC: 127 MG/DL (ref 70–99)
GLUCOSE BLD-MCNC: 127 MG/DL (ref 70–99)
GLUCOSE BLD-MCNC: 128 MG/DL (ref 70–99)
GLUCOSE BLD-MCNC: 128 MG/DL (ref 70–99)
GLUCOSE BLD-MCNC: 129 MG/DL (ref 70–99)
GLUCOSE BLD-MCNC: 130 MG/DL (ref 70–99)
GLUCOSE BLD-MCNC: 130 MG/DL (ref 70–99)
GLUCOSE BLD-MCNC: 132 MG/DL (ref 70–99)
GLUCOSE BLD-MCNC: 135 MG/DL (ref 70–99)
GLUCOSE BLD-MCNC: 136 MG/DL (ref 70–99)
GLUCOSE BLD-MCNC: 138 MG/DL (ref 70–99)
GLUCOSE BLD-MCNC: 145 MG/DL (ref 70–99)
GLUCOSE BLD-MCNC: 149 MG/DL (ref 70–99)
GLUCOSE BLD-MCNC: 151 MG/DL (ref 70–99)
GLUCOSE BLD-MCNC: 153 MG/DL (ref 70–99)
GLUCOSE BLD-MCNC: 153 MG/DL (ref 70–99)
GLUCOSE BLD-MCNC: 157 MG/DL (ref 70–99)
GLUCOSE BLD-MCNC: 158 MG/DL (ref 70–99)
GLUCOSE BLD-MCNC: 159 MG/DL (ref 70–99)
GLUCOSE BLD-MCNC: 165 MG/DL (ref 70–99)
GLUCOSE BLD-MCNC: 167 MG/DL (ref 70–99)
GLUCOSE BLD-MCNC: 168 MG/DL (ref 70–99)
GLUCOSE BLD-MCNC: 170 MG/DL (ref 70–99)
GLUCOSE BLD-MCNC: 174 MG/DL (ref 70–99)
GLUCOSE BLD-MCNC: 193 MG/DL (ref 70–99)
GLUCOSE BLD-MCNC: 90 MG/DL (ref 70–99)
GLUCOSE BLD-MCNC: 95 MG/DL (ref 70–99)
GLUCOSE BLD-MCNC: 95 MG/DL (ref 70–99)
GLUCOSE BLD-MCNC: 97 MG/DL (ref 70–99)
GLUCOSE URINE: NEGATIVE MG/DL
HCO3 ARTERIAL: 23 MMOL/L (ref 21–29)
HCO3 VENOUS: 19.3 MMOL/L (ref 23–29)
HCT VFR BLD CALC: 35.8 % (ref 36–48)
HCT VFR BLD CALC: 36.1 % (ref 36–48)
HCT VFR BLD CALC: 36.9 % (ref 36–48)
HCT VFR BLD CALC: 37.4 % (ref 36–48)
HCT VFR BLD CALC: 37.8 % (ref 36–48)
HCT VFR BLD CALC: 40.9 % (ref 36–48)
HCT VFR BLD CALC: 42.8 % (ref 36–48)
HCT VFR BLD CALC: 43 % (ref 36–48)
HCT VFR BLD CALC: 46.4 % (ref 36–48)
HCT VFR BLD CALC: 54 % (ref 36–48)
HEMATOLOGY PATH CONSULT: NO
HEMATOLOGY PATH CONSULT: NORMAL
HEMATOLOGY PATH CONSULT: YES
HEMOGLOBIN, ART, EXTENDED: 14.5 G/DL (ref 12–16)
HEMOGLOBIN: 11.6 G/DL (ref 12–16)
HEMOGLOBIN: 11.8 G/DL (ref 12–16)
HEMOGLOBIN: 11.9 G/DL (ref 12–16)
HEMOGLOBIN: 12 G/DL (ref 12–16)
HEMOGLOBIN: 12.1 G/DL (ref 12–16)
HEMOGLOBIN: 13.5 G/DL (ref 12–16)
HEMOGLOBIN: 14.2 G/DL (ref 12–16)
HEMOGLOBIN: 14.2 G/DL (ref 12–16)
HEMOGLOBIN: 14.7 G/DL (ref 12–16)
HEMOGLOBIN: 17.3 G/DL (ref 12–16)
KETONES, URINE: NEGATIVE MG/DL
LACTIC ACID, SEPSIS: 1.5 MMOL/L (ref 0.4–1.9)
LACTIC ACID: 1.6 MMOL/L (ref 0.4–2)
LACTIC ACID: 2.3 MMOL/L (ref 0.4–2)
LEUKOCYTE ESTERASE, URINE: ABNORMAL
LYMPHOCYTES ABSOLUTE: 0.3 K/UL (ref 1–5.1)
LYMPHOCYTES ABSOLUTE: 0.4 K/UL (ref 1–5.1)
LYMPHOCYTES ABSOLUTE: 0.4 K/UL (ref 1–5.1)
LYMPHOCYTES ABSOLUTE: 0.5 K/UL (ref 1–5.1)
LYMPHOCYTES ABSOLUTE: 0.6 K/UL (ref 1–5.1)
LYMPHOCYTES ABSOLUTE: 0.9 K/UL (ref 1–5.1)
LYMPHOCYTES ABSOLUTE: 1.1 K/UL (ref 1–5.1)
LYMPHOCYTES ABSOLUTE: 1.7 K/UL (ref 1–5.1)
LYMPHOCYTES RELATIVE PERCENT: 10 %
LYMPHOCYTES RELATIVE PERCENT: 2.1 %
LYMPHOCYTES RELATIVE PERCENT: 3.2 %
LYMPHOCYTES RELATIVE PERCENT: 3.2 %
LYMPHOCYTES RELATIVE PERCENT: 3.6 %
LYMPHOCYTES RELATIVE PERCENT: 4 %
LYMPHOCYTES RELATIVE PERCENT: 4.1 %
LYMPHOCYTES RELATIVE PERCENT: 5 %
LYMPHOCYTES RELATIVE PERCENT: 6 %
LYMPHOCYTES RELATIVE PERCENT: 6.4 %
Lab: NORMAL
MAGNESIUM: 1.7 MG/DL (ref 1.8–2.4)
MAGNESIUM: 1.9 MG/DL (ref 1.8–2.4)
MAGNESIUM: 2 MG/DL (ref 1.8–2.4)
MAGNESIUM: 2.4 MG/DL (ref 1.8–2.4)
MAGNESIUM: 2.5 MG/DL (ref 1.8–2.4)
MAGNESIUM: 3.5 MG/DL (ref 1.8–2.4)
MCH RBC QN AUTO: 30.7 PG (ref 26–34)
MCH RBC QN AUTO: 30.8 PG (ref 26–34)
MCH RBC QN AUTO: 30.9 PG (ref 26–34)
MCH RBC QN AUTO: 31 PG (ref 26–34)
MCH RBC QN AUTO: 31.1 PG (ref 26–34)
MCH RBC QN AUTO: 31.3 PG (ref 26–34)
MCH RBC QN AUTO: 31.3 PG (ref 26–34)
MCH RBC QN AUTO: 31.4 PG (ref 26–34)
MCH RBC QN AUTO: 31.5 PG (ref 26–34)
MCH RBC QN AUTO: 31.8 PG (ref 26–34)
MCHC RBC AUTO-ENTMCNC: 31.4 G/DL (ref 31–36)
MCHC RBC AUTO-ENTMCNC: 31.6 G/DL (ref 31–36)
MCHC RBC AUTO-ENTMCNC: 32.1 G/DL (ref 31–36)
MCHC RBC AUTO-ENTMCNC: 32.2 G/DL (ref 31–36)
MCHC RBC AUTO-ENTMCNC: 32.3 G/DL (ref 31–36)
MCHC RBC AUTO-ENTMCNC: 32.8 G/DL (ref 31–36)
MCHC RBC AUTO-ENTMCNC: 32.9 G/DL (ref 31–36)
MCHC RBC AUTO-ENTMCNC: 32.9 G/DL (ref 31–36)
MCHC RBC AUTO-ENTMCNC: 33 G/DL (ref 31–36)
MCHC RBC AUTO-ENTMCNC: 33.2 G/DL (ref 31–36)
MCV RBC AUTO: 94.5 FL (ref 80–100)
MCV RBC AUTO: 95 FL (ref 80–100)
MCV RBC AUTO: 95.2 FL (ref 80–100)
MCV RBC AUTO: 95.6 FL (ref 80–100)
MCV RBC AUTO: 95.8 FL (ref 80–100)
MCV RBC AUTO: 95.9 FL (ref 80–100)
MCV RBC AUTO: 96.1 FL (ref 80–100)
MCV RBC AUTO: 97.3 FL (ref 80–100)
MCV RBC AUTO: 97.9 FL (ref 80–100)
MCV RBC AUTO: 97.9 FL (ref 80–100)
METAMYELOCYTES RELATIVE PERCENT: 1 %
METHADONE SCREEN, URINE: NORMAL
METHEMOGLOBIN ARTERIAL: 0.6 %
METHEMOGLOBIN VENOUS: 0.4 %
MICROSCOPIC EXAMINATION: YES
MONOCYTES ABSOLUTE: 0.4 K/UL (ref 0–1.3)
MONOCYTES ABSOLUTE: 0.5 K/UL (ref 0–1.3)
MONOCYTES ABSOLUTE: 0.8 K/UL (ref 0–1.3)
MONOCYTES ABSOLUTE: 0.8 K/UL (ref 0–1.3)
MONOCYTES ABSOLUTE: 1.1 K/UL (ref 0–1.3)
MONOCYTES ABSOLUTE: 1.5 K/UL (ref 0–1.3)
MONOCYTES ABSOLUTE: 1.7 K/UL (ref 0–1.3)
MONOCYTES RELATIVE PERCENT: 10.7 %
MONOCYTES RELATIVE PERCENT: 12 %
MONOCYTES RELATIVE PERCENT: 3.1 %
MONOCYTES RELATIVE PERCENT: 4.4 %
MONOCYTES RELATIVE PERCENT: 4.4 %
MONOCYTES RELATIVE PERCENT: 4.8 %
MONOCYTES RELATIVE PERCENT: 5.1 %
MONOCYTES RELATIVE PERCENT: 7 %
MONOCYTES RELATIVE PERCENT: 8 %
MONOCYTES RELATIVE PERCENT: 9 %
MYELOCYTE PERCENT: 1 %
MYELOCYTE PERCENT: 1 %
NEUTROPHILS ABSOLUTE: 11.2 K/UL (ref 1.7–7.7)
NEUTROPHILS ABSOLUTE: 11.5 K/UL (ref 1.7–7.7)
NEUTROPHILS ABSOLUTE: 11.8 K/UL (ref 1.7–7.7)
NEUTROPHILS ABSOLUTE: 14.2 K/UL (ref 1.7–7.7)
NEUTROPHILS ABSOLUTE: 15.6 K/UL (ref 1.7–7.7)
NEUTROPHILS ABSOLUTE: 7.3 K/UL (ref 1.7–7.7)
NEUTROPHILS ABSOLUTE: 7.6 K/UL (ref 1.7–7.7)
NEUTROPHILS ABSOLUTE: 8.2 K/UL (ref 1.7–7.7)
NEUTROPHILS ABSOLUTE: 9 K/UL (ref 1.7–7.7)
NEUTROPHILS ABSOLUTE: 9.8 K/UL (ref 1.7–7.7)
NEUTROPHILS RELATIVE PERCENT: 76 %
NEUTROPHILS RELATIVE PERCENT: 79 %
NEUTROPHILS RELATIVE PERCENT: 80 %
NEUTROPHILS RELATIVE PERCENT: 80 %
NEUTROPHILS RELATIVE PERCENT: 82.1 %
NEUTROPHILS RELATIVE PERCENT: 90.6 %
NEUTROPHILS RELATIVE PERCENT: 91.6 %
NEUTROPHILS RELATIVE PERCENT: 91.9 %
NEUTROPHILS RELATIVE PERCENT: 92.9 %
NEUTROPHILS RELATIVE PERCENT: 93.2 %
NITRITE, URINE: NEGATIVE
O2 CONTENT ARTERIAL: 19 ML/DL
O2 CONTENT, VEN: 20 VOL %
O2 SAT, ARTERIAL: 95.5 %
O2 SAT, VEN: 79 %
O2 THERAPY: ABNORMAL
O2 THERAPY: NORMAL
OPIATE SCREEN URINE: NORMAL
ORGANISM: ABNORMAL
OXYCODONE URINE: NORMAL
PCO2 ARTERIAL: 38.9 MMHG (ref 35–45)
PCO2, VEN: 38.7 MMHG (ref 40–50)
PDW BLD-RTO: 13 % (ref 12.4–15.4)
PDW BLD-RTO: 13.9 % (ref 12.4–15.4)
PDW BLD-RTO: 14.2 % (ref 12.4–15.4)
PDW BLD-RTO: 14.3 % (ref 12.4–15.4)
PDW BLD-RTO: 14.4 % (ref 12.4–15.4)
PDW BLD-RTO: 14.6 % (ref 12.4–15.4)
PDW BLD-RTO: 14.7 % (ref 12.4–15.4)
PDW BLD-RTO: 14.8 % (ref 12.4–15.4)
PDW BLD-RTO: 14.8 % (ref 12.4–15.4)
PDW BLD-RTO: 15.1 % (ref 12.4–15.4)
PERFORMED ON: ABNORMAL
PERFORMED ON: NORMAL
PH ARTERIAL: 7.39 (ref 7.35–7.45)
PH UA: 5
PH UA: 5 (ref 5–8)
PH VENOUS: 7.32 (ref 7.35–7.45)
PHENCYCLIDINE SCREEN URINE: NORMAL
PHOSPHORUS: 3.2 MG/DL (ref 2.5–4.9)
PHOSPHORUS: 3.4 MG/DL (ref 2.5–4.9)
PHOSPHORUS: 3.5 MG/DL (ref 2.5–4.9)
PHOSPHORUS: 3.6 MG/DL (ref 2.5–4.9)
PHOSPHORUS: 3.8 MG/DL (ref 2.5–4.9)
PHOSPHORUS: 3.9 MG/DL (ref 2.5–4.9)
PHOSPHORUS: 4.7 MG/DL (ref 2.5–4.9)
PHOSPHORUS: 5.3 MG/DL (ref 2.5–4.9)
PHOSPHORUS: 5.6 MG/DL (ref 2.5–4.9)
PLATELET # BLD: 152 K/UL (ref 135–450)
PLATELET # BLD: 182 K/UL (ref 135–450)
PLATELET # BLD: 187 K/UL (ref 135–450)
PLATELET # BLD: 187 K/UL (ref 135–450)
PLATELET # BLD: 192 K/UL (ref 135–450)
PLATELET # BLD: 195 K/UL (ref 135–450)
PLATELET # BLD: 198 K/UL (ref 135–450)
PLATELET # BLD: 203 K/UL (ref 135–450)
PLATELET # BLD: 220 K/UL (ref 135–450)
PLATELET # BLD: 299 K/UL (ref 135–450)
PLATELET SLIDE REVIEW: ADEQUATE
PMV BLD AUTO: 10 FL (ref 5–10.5)
PMV BLD AUTO: 10.3 FL (ref 5–10.5)
PMV BLD AUTO: 8.6 FL (ref 5–10.5)
PMV BLD AUTO: 8.9 FL (ref 5–10.5)
PMV BLD AUTO: 8.9 FL (ref 5–10.5)
PMV BLD AUTO: 9.1 FL (ref 5–10.5)
PMV BLD AUTO: 9.4 FL (ref 5–10.5)
PMV BLD AUTO: 9.5 FL (ref 5–10.5)
PMV BLD AUTO: 9.6 FL (ref 5–10.5)
PMV BLD AUTO: 9.8 FL (ref 5–10.5)
PO2 ARTERIAL: 80.2 MMHG (ref 75–108)
PO2, VEN: 47 MMHG (ref 25–40)
POTASSIUM REFLEX MAGNESIUM: 3.1 MMOL/L (ref 3.5–5.1)
POTASSIUM REFLEX MAGNESIUM: 3.4 MMOL/L (ref 3.5–5.1)
POTASSIUM REFLEX MAGNESIUM: 3.7 MMOL/L (ref 3.5–5.1)
POTASSIUM REFLEX MAGNESIUM: 3.9 MMOL/L (ref 3.5–5.1)
POTASSIUM REFLEX MAGNESIUM: 3.9 MMOL/L (ref 3.5–5.1)
POTASSIUM REFLEX MAGNESIUM: 4.3 MMOL/L (ref 3.5–5.1)
POTASSIUM SERPL-SCNC: 3.1 MMOL/L (ref 3.5–5.1)
POTASSIUM SERPL-SCNC: 3.4 MMOL/L (ref 3.5–5.1)
POTASSIUM SERPL-SCNC: 3.8 MMOL/L (ref 3.5–5.1)
POTASSIUM SERPL-SCNC: 3.9 MMOL/L (ref 3.5–5.1)
POTASSIUM SERPL-SCNC: 3.9 MMOL/L (ref 3.5–5.1)
POTASSIUM SERPL-SCNC: 4 MMOL/L (ref 3.5–5.1)
POTASSIUM SERPL-SCNC: 4 MMOL/L (ref 3.5–5.1)
POTASSIUM SERPL-SCNC: 4.3 MMOL/L (ref 3.5–5.1)
POTASSIUM SERPL-SCNC: 4.5 MMOL/L (ref 3.5–5.1)
PRO-BNP: 3779 PG/ML (ref 0–449)
PRO-BNP: 8965 PG/ML (ref 0–449)
PROCALCITONIN: 0.19 NG/ML (ref 0–0.15)
PROCALCITONIN: 0.31 NG/ML (ref 0–0.15)
PROCALCITONIN: 0.4 NG/ML (ref 0–0.15)
PROPOXYPHENE SCREEN: NORMAL
PROTEIN UA: ABNORMAL MG/DL
RAPID INFLUENZA  B AGN: NEGATIVE
RAPID INFLUENZA A AGN: NEGATIVE
RBC # BLD: 3.73 M/UL (ref 4–5.2)
RBC # BLD: 3.77 M/UL (ref 4–5.2)
RBC # BLD: 3.82 M/UL (ref 4–5.2)
RBC # BLD: 3.86 M/UL (ref 4–5.2)
RBC # BLD: 3.87 M/UL (ref 4–5.2)
RBC # BLD: 4.3 M/UL (ref 4–5.2)
RBC # BLD: 4.48 M/UL (ref 4–5.2)
RBC # BLD: 4.55 M/UL (ref 4–5.2)
RBC # BLD: 4.77 M/UL (ref 4–5.2)
RBC # BLD: 5.62 M/UL (ref 4–5.2)
RBC # BLD: NORMAL 10*6/UL
RBC UA: ABNORMAL /HPF (ref 0–4)
SARS-COV-2, NAAT: NOT DETECTED
SARS-COV-2, PCR: DETECTED
SLIDE REVIEW: ABNORMAL
SODIUM BLD-SCNC: 138 MMOL/L (ref 136–145)
SODIUM BLD-SCNC: 145 MMOL/L (ref 136–145)
SODIUM BLD-SCNC: 146 MMOL/L (ref 136–145)
SODIUM BLD-SCNC: 147 MMOL/L (ref 136–145)
SODIUM BLD-SCNC: 149 MMOL/L (ref 136–145)
SODIUM BLD-SCNC: 149 MMOL/L (ref 136–145)
SODIUM BLD-SCNC: 151 MMOL/L (ref 136–145)
SODIUM BLD-SCNC: 151 MMOL/L (ref 136–145)
SODIUM BLD-SCNC: 152 MMOL/L (ref 136–145)
SODIUM BLD-SCNC: 153 MMOL/L (ref 136–145)
SODIUM BLD-SCNC: 154 MMOL/L (ref 136–145)
SODIUM BLD-SCNC: 155 MMOL/L (ref 136–145)
SODIUM BLD-SCNC: 155 MMOL/L (ref 136–145)
SPECIFIC GRAVITY UA: 1.02 (ref 1–1.03)
TCO2 ARTERIAL: 24.2 MMOL/L
TCO2 CALC VENOUS: 21 MMOL/L
TOTAL CK: 189 U/L (ref 26–192)
TOTAL CK: 246 U/L (ref 26–192)
TOTAL CK: 440 U/L (ref 26–192)
TOTAL PROTEIN: 5.9 G/DL (ref 6.4–8.2)
TOTAL PROTEIN: 6.4 G/DL (ref 6.4–8.2)
TOTAL PROTEIN: 6.9 G/DL (ref 6.4–8.2)
TOTAL PROTEIN: 8 G/DL (ref 6.4–8.2)
TROPONIN: 0.11 NG/ML
TROPONIN: 0.11 NG/ML
TROPONIN: 0.12 NG/ML
TROPONIN: 0.16 NG/ML
TROPONIN: <0.01 NG/ML
URINE CULTURE, ROUTINE: ABNORMAL
URINE TYPE: ABNORMAL
UROBILINOGEN, URINE: 0.2 E.U./DL
WBC # BLD: 10 K/UL (ref 4–11)
WBC # BLD: 10.7 K/UL (ref 4–11)
WBC # BLD: 10.8 K/UL (ref 4–11)
WBC # BLD: 12.2 K/UL (ref 4–11)
WBC # BLD: 12.4 K/UL (ref 4–11)
WBC # BLD: 14.4 K/UL (ref 4–11)
WBC # BLD: 15.2 K/UL (ref 4–11)
WBC # BLD: 18.8 K/UL (ref 4–11)
WBC # BLD: 8.3 K/UL (ref 4–11)
WBC # BLD: 8.5 K/UL (ref 4–11)
WBC UA: >100 /HPF (ref 0–5)

## 2020-01-01 PROCEDURE — 96372 THER/PROPH/DIAG INJ SC/IM: CPT

## 2020-01-01 PROCEDURE — 2580000003 HC RX 258: Performed by: INTERNAL MEDICINE

## 2020-01-01 PROCEDURE — 80069 RENAL FUNCTION PANEL: CPT

## 2020-01-01 PROCEDURE — 6370000000 HC RX 637 (ALT 250 FOR IP): Performed by: INTERNAL MEDICINE

## 2020-01-01 PROCEDURE — 80053 COMPREHEN METABOLIC PANEL: CPT

## 2020-01-01 PROCEDURE — 84484 ASSAY OF TROPONIN QUANT: CPT

## 2020-01-01 PROCEDURE — 36415 COLL VENOUS BLD VENIPUNCTURE: CPT

## 2020-01-01 PROCEDURE — 6360000002 HC RX W HCPCS: Performed by: PHYSICIAN ASSISTANT

## 2020-01-01 PROCEDURE — 99232 SBSQ HOSP IP/OBS MODERATE 35: CPT | Performed by: INTERNAL MEDICINE

## 2020-01-01 PROCEDURE — 82728 ASSAY OF FERRITIN: CPT

## 2020-01-01 PROCEDURE — 2700000000 HC OXYGEN THERAPY PER DAY

## 2020-01-01 PROCEDURE — 99291 CRITICAL CARE FIRST HOUR: CPT | Performed by: INTERNAL MEDICINE

## 2020-01-01 PROCEDURE — 85025 COMPLETE CBC W/AUTO DIFF WBC: CPT

## 2020-01-01 PROCEDURE — U0003 INFECTIOUS AGENT DETECTION BY NUCLEIC ACID (DNA OR RNA); SEVERE ACUTE RESPIRATORY SYNDROME CORONAVIRUS 2 (SARS-COV-2) (CORONAVIRUS DISEASE [COVID-19]), AMPLIFIED PROBE TECHNIQUE, MAKING USE OF HIGH THROUGHPUT TECHNOLOGIES AS DESCRIBED BY CMS-2020-01-R: HCPCS

## 2020-01-01 PROCEDURE — 2060000000 HC ICU INTERMEDIATE R&B

## 2020-01-01 PROCEDURE — 85379 FIBRIN DEGRADATION QUANT: CPT

## 2020-01-01 PROCEDURE — 6360000004 HC RX CONTRAST MEDICATION: Performed by: PHYSICIAN ASSISTANT

## 2020-01-01 PROCEDURE — 6360000002 HC RX W HCPCS: Performed by: INTERNAL MEDICINE

## 2020-01-01 PROCEDURE — 74018 RADEX ABDOMEN 1 VIEW: CPT

## 2020-01-01 PROCEDURE — 94761 N-INVAS EAR/PLS OXIMETRY MLT: CPT

## 2020-01-01 PROCEDURE — 6360000002 HC RX W HCPCS: Performed by: NURSE PRACTITIONER

## 2020-01-01 PROCEDURE — G0480 DRUG TEST DEF 1-7 CLASSES: HCPCS

## 2020-01-01 PROCEDURE — 93010 ELECTROCARDIOGRAM REPORT: CPT | Performed by: INTERNAL MEDICINE

## 2020-01-01 PROCEDURE — 83605 ASSAY OF LACTIC ACID: CPT

## 2020-01-01 PROCEDURE — 86140 C-REACTIVE PROTEIN: CPT

## 2020-01-01 PROCEDURE — 93308 TTE F-UP OR LMTD: CPT

## 2020-01-01 PROCEDURE — G0378 HOSPITAL OBSERVATION PER HR: HCPCS

## 2020-01-01 PROCEDURE — XW033E5 INTRODUCTION OF REMDESIVIR ANTI-INFECTIVE INTO PERIPHERAL VEIN, PERCUTANEOUS APPROACH, NEW TECHNOLOGY GROUP 5: ICD-10-PCS | Performed by: INTERNAL MEDICINE

## 2020-01-01 PROCEDURE — 96376 TX/PRO/DX INJ SAME DRUG ADON: CPT

## 2020-01-01 PROCEDURE — 99233 SBSQ HOSP IP/OBS HIGH 50: CPT | Performed by: INTERNAL MEDICINE

## 2020-01-01 PROCEDURE — 2500000003 HC RX 250 WO HCPCS: Performed by: INTERNAL MEDICINE

## 2020-01-01 PROCEDURE — 97530 THERAPEUTIC ACTIVITIES: CPT

## 2020-01-01 PROCEDURE — 99284 EMERGENCY DEPT VISIT MOD MDM: CPT

## 2020-01-01 PROCEDURE — 87804 INFLUENZA ASSAY W/OPTIC: CPT

## 2020-01-01 PROCEDURE — 82550 ASSAY OF CK (CPK): CPT

## 2020-01-01 PROCEDURE — 71045 X-RAY EXAM CHEST 1 VIEW: CPT

## 2020-01-01 PROCEDURE — 83735 ASSAY OF MAGNESIUM: CPT

## 2020-01-01 PROCEDURE — 1200000000 HC SEMI PRIVATE

## 2020-01-01 PROCEDURE — 94660 CPAP INITIATION&MGMT: CPT

## 2020-01-01 PROCEDURE — 81001 URINALYSIS AUTO W/SCOPE: CPT

## 2020-01-01 PROCEDURE — 97162 PT EVAL MOD COMPLEX 30 MIN: CPT

## 2020-01-01 PROCEDURE — 93005 ELECTROCARDIOGRAM TRACING: CPT | Performed by: EMERGENCY MEDICINE

## 2020-01-01 PROCEDURE — 87077 CULTURE AEROBIC IDENTIFY: CPT

## 2020-01-01 PROCEDURE — 36600 WITHDRAWAL OF ARTERIAL BLOOD: CPT

## 2020-01-01 PROCEDURE — 3430000000 HC RX DIAGNOSTIC RADIOPHARMACEUTICAL: Performed by: NURSE PRACTITIONER

## 2020-01-01 PROCEDURE — 99222 1ST HOSP IP/OBS MODERATE 55: CPT | Performed by: PSYCHIATRY & NEUROLOGY

## 2020-01-01 PROCEDURE — U0002 COVID-19 LAB TEST NON-CDC: HCPCS

## 2020-01-01 PROCEDURE — 72125 CT NECK SPINE W/O DYE: CPT

## 2020-01-01 PROCEDURE — 82140 ASSAY OF AMMONIA: CPT

## 2020-01-01 PROCEDURE — 99222 1ST HOSP IP/OBS MODERATE 55: CPT | Performed by: INTERNAL MEDICINE

## 2020-01-01 PROCEDURE — 74176 CT ABD & PELVIS W/O CONTRAST: CPT

## 2020-01-01 PROCEDURE — 96374 THER/PROPH/DIAG INJ IV PUSH: CPT

## 2020-01-01 PROCEDURE — 97535 SELF CARE MNGMENT TRAINING: CPT

## 2020-01-01 PROCEDURE — 84100 ASSAY OF PHOSPHORUS: CPT

## 2020-01-01 PROCEDURE — 84295 ASSAY OF SERUM SODIUM: CPT

## 2020-01-01 PROCEDURE — 87086 URINE CULTURE/COLONY COUNT: CPT

## 2020-01-01 PROCEDURE — 83880 ASSAY OF NATRIURETIC PEPTIDE: CPT

## 2020-01-01 PROCEDURE — 97166 OT EVAL MOD COMPLEX 45 MIN: CPT

## 2020-01-01 PROCEDURE — 70450 CT HEAD/BRAIN W/O DYE: CPT

## 2020-01-01 PROCEDURE — 97110 THERAPEUTIC EXERCISES: CPT

## 2020-01-01 PROCEDURE — 2500000003 HC RX 250 WO HCPCS: Performed by: NURSE PRACTITIONER

## 2020-01-01 PROCEDURE — 80076 HEPATIC FUNCTION PANEL: CPT

## 2020-01-01 PROCEDURE — 6370000000 HC RX 637 (ALT 250 FOR IP): Performed by: NURSE PRACTITIONER

## 2020-01-01 PROCEDURE — 80307 DRUG TEST PRSMV CHEM ANLYZR: CPT

## 2020-01-01 PROCEDURE — 51702 INSERT TEMP BLADDER CATH: CPT

## 2020-01-01 PROCEDURE — 6360000002 HC RX W HCPCS: Performed by: EMERGENCY MEDICINE

## 2020-01-01 PROCEDURE — 99285 EMERGENCY DEPT VISIT HI MDM: CPT

## 2020-01-01 PROCEDURE — A9540 TC99M MAA: HCPCS | Performed by: NURSE PRACTITIONER

## 2020-01-01 PROCEDURE — 85730 THROMBOPLASTIN TIME PARTIAL: CPT

## 2020-01-01 PROCEDURE — 82803 BLOOD GASES ANY COMBINATION: CPT

## 2020-01-01 PROCEDURE — 2580000003 HC RX 258: Performed by: EMERGENCY MEDICINE

## 2020-01-01 PROCEDURE — 78582 LUNG VENTILAT&PERFUS IMAGING: CPT

## 2020-01-01 PROCEDURE — 96375 TX/PRO/DX INJ NEW DRUG ADDON: CPT

## 2020-01-01 PROCEDURE — 84145 PROCALCITONIN (PCT): CPT

## 2020-01-01 PROCEDURE — A9558 XE133 XENON 10MCI: HCPCS | Performed by: NURSE PRACTITIONER

## 2020-01-01 PROCEDURE — 87186 SC STD MICRODIL/AGAR DIL: CPT

## 2020-01-01 PROCEDURE — 96360 HYDRATION IV INFUSION INIT: CPT

## 2020-01-01 PROCEDURE — 73110 X-RAY EXAM OF WRIST: CPT

## 2020-01-01 PROCEDURE — 93970 EXTREMITY STUDY: CPT

## 2020-01-01 PROCEDURE — 99222 1ST HOSP IP/OBS MODERATE 55: CPT | Performed by: SURGERY

## 2020-01-01 PROCEDURE — 87040 BLOOD CULTURE FOR BACTERIA: CPT

## 2020-01-01 PROCEDURE — 74177 CT ABD & PELVIS W/CONTRAST: CPT

## 2020-01-01 RX ORDER — SODIUM CHLORIDE 0.9 % (FLUSH) 0.9 %
10 SYRINGE (ML) INJECTION PRN
Status: DISCONTINUED | OUTPATIENT
Start: 2020-01-01 | End: 2020-01-01 | Stop reason: HOSPADM

## 2020-01-01 RX ORDER — POLYETHYLENE GLYCOL 3350 17 G/17G
17 POWDER, FOR SOLUTION ORAL DAILY PRN
Status: DISCONTINUED | OUTPATIENT
Start: 2020-01-01 | End: 2020-01-01 | Stop reason: HOSPADM

## 2020-01-01 RX ORDER — METOPROLOL SUCCINATE 25 MG/1
25 TABLET, EXTENDED RELEASE ORAL DAILY
Status: DISCONTINUED | OUTPATIENT
Start: 2020-01-01 | End: 2020-01-01 | Stop reason: HOSPADM

## 2020-01-01 RX ORDER — HEPARIN SODIUM 10000 [USP'U]/100ML
6.9 INJECTION, SOLUTION INTRAVENOUS CONTINUOUS
Status: DISCONTINUED | OUTPATIENT
Start: 2020-01-01 | End: 2020-01-01

## 2020-01-01 RX ORDER — METOPROLOL SUCCINATE 25 MG/1
25 TABLET, EXTENDED RELEASE ORAL DAILY
Qty: 30 TABLET | Refills: 3 | Status: SHIPPED | OUTPATIENT
Start: 2020-01-01

## 2020-01-01 RX ORDER — SODIUM CHLORIDE 9 MG/ML
INJECTION, SOLUTION INTRAVENOUS CONTINUOUS
Status: DISCONTINUED | OUTPATIENT
Start: 2020-01-01 | End: 2020-01-01

## 2020-01-01 RX ORDER — HEPARIN SODIUM 5000 [USP'U]/ML
5000 INJECTION, SOLUTION INTRAVENOUS; SUBCUTANEOUS EVERY 8 HOURS SCHEDULED
Status: DISCONTINUED | OUTPATIENT
Start: 2020-01-01 | End: 2020-01-01 | Stop reason: HOSPADM

## 2020-01-01 RX ORDER — HEPARIN SODIUM 1000 [USP'U]/ML
2300 INJECTION, SOLUTION INTRAVENOUS; SUBCUTANEOUS PRN
Status: DISCONTINUED | OUTPATIENT
Start: 2020-01-01 | End: 2020-01-01

## 2020-01-01 RX ORDER — ACETAMINOPHEN 325 MG/1
650 TABLET ORAL EVERY 6 HOURS PRN
Status: DISCONTINUED | OUTPATIENT
Start: 2020-01-01 | End: 2020-01-01 | Stop reason: HOSPADM

## 2020-01-01 RX ORDER — SODIUM CHLORIDE 0.9 % (FLUSH) 0.9 %
10 SYRINGE (ML) INJECTION EVERY 12 HOURS SCHEDULED
Status: DISCONTINUED | OUTPATIENT
Start: 2020-01-01 | End: 2020-01-01 | Stop reason: SDUPTHER

## 2020-01-01 RX ORDER — ROPINIROLE 0.5 MG/1
0.5 TABLET, FILM COATED ORAL NIGHTLY
Status: DISCONTINUED | OUTPATIENT
Start: 2020-01-01 | End: 2020-01-01 | Stop reason: HOSPADM

## 2020-01-01 RX ORDER — MORPHINE SULFATE 2 MG/ML
2 INJECTION, SOLUTION INTRAMUSCULAR; INTRAVENOUS ONCE
Status: COMPLETED | OUTPATIENT
Start: 2020-01-01 | End: 2020-01-01

## 2020-01-01 RX ORDER — MORPHINE SULFATE 2 MG/ML
1 INJECTION, SOLUTION INTRAMUSCULAR; INTRAVENOUS ONCE
Status: COMPLETED | OUTPATIENT
Start: 2020-01-01 | End: 2020-01-01

## 2020-01-01 RX ORDER — METHYLPREDNISOLONE SODIUM SUCCINATE 40 MG/ML
40 INJECTION, POWDER, LYOPHILIZED, FOR SOLUTION INTRAMUSCULAR; INTRAVENOUS EVERY 12 HOURS
Status: DISCONTINUED | OUTPATIENT
Start: 2020-01-01 | End: 2020-01-01

## 2020-01-01 RX ORDER — DEXTROSE, SODIUM CHLORIDE, SODIUM LACTATE, POTASSIUM CHLORIDE, AND CALCIUM CHLORIDE 5; .6; .31; .03; .02 G/100ML; G/100ML; G/100ML; G/100ML; G/100ML
INJECTION, SOLUTION INTRAVENOUS CONTINUOUS
Status: DISCONTINUED | OUTPATIENT
Start: 2020-01-01 | End: 2020-01-01

## 2020-01-01 RX ORDER — ATORVASTATIN CALCIUM 40 MG/1
40 TABLET, FILM COATED ORAL NIGHTLY
Status: DISCONTINUED | OUTPATIENT
Start: 2020-01-01 | End: 2020-01-01 | Stop reason: HOSPADM

## 2020-01-01 RX ORDER — SODIUM CHLORIDE 0.9 % (FLUSH) 0.9 %
10 SYRINGE (ML) INJECTION EVERY 12 HOURS SCHEDULED
Status: DISCONTINUED | OUTPATIENT
Start: 2020-01-01 | End: 2020-01-01 | Stop reason: HOSPADM

## 2020-01-01 RX ORDER — METOPROLOL TARTRATE 5 MG/5ML
2.5 INJECTION INTRAVENOUS 2 TIMES DAILY
Status: DISCONTINUED | OUTPATIENT
Start: 2020-01-01 | End: 2020-01-01 | Stop reason: HOSPADM

## 2020-01-01 RX ORDER — PROMETHAZINE HYDROCHLORIDE 25 MG/1
12.5 TABLET ORAL EVERY 6 HOURS PRN
Status: DISCONTINUED | OUTPATIENT
Start: 2020-01-01 | End: 2020-01-01 | Stop reason: HOSPADM

## 2020-01-01 RX ORDER — OMEPRAZOLE 20 MG/1
CAPSULE, DELAYED RELEASE ORAL
Qty: 90 CAPSULE | Refills: 3 | Status: SHIPPED | OUTPATIENT
Start: 2020-01-01

## 2020-01-01 RX ORDER — ACETAMINOPHEN 650 MG/1
650 SUPPOSITORY RECTAL EVERY 6 HOURS PRN
Status: DISCONTINUED | OUTPATIENT
Start: 2020-01-01 | End: 2020-01-01 | Stop reason: HOSPADM

## 2020-01-01 RX ORDER — DEXTROSE MONOHYDRATE 50 MG/ML
INJECTION, SOLUTION INTRAVENOUS CONTINUOUS
Status: DISCONTINUED | OUTPATIENT
Start: 2020-01-01 | End: 2020-01-01

## 2020-01-01 RX ORDER — NITROGLYCERIN 0.4 MG/1
0.4 TABLET SUBLINGUAL EVERY 5 MIN PRN
Status: DISCONTINUED | OUTPATIENT
Start: 2020-01-01 | End: 2020-01-01 | Stop reason: HOSPADM

## 2020-01-01 RX ORDER — ONDANSETRON 2 MG/ML
4 INJECTION INTRAMUSCULAR; INTRAVENOUS ONCE
Status: COMPLETED | OUTPATIENT
Start: 2020-01-01 | End: 2020-01-01

## 2020-01-01 RX ORDER — HEPARIN SODIUM 1000 [USP'U]/ML
4600 INJECTION, SOLUTION INTRAVENOUS; SUBCUTANEOUS ONCE
Status: COMPLETED | OUTPATIENT
Start: 2020-01-01 | End: 2020-01-01

## 2020-01-01 RX ORDER — MAGNESIUM SULFATE 1 G/100ML
1 INJECTION INTRAVENOUS ONCE
Status: COMPLETED | OUTPATIENT
Start: 2020-01-01 | End: 2020-01-01

## 2020-01-01 RX ORDER — ONDANSETRON 2 MG/ML
4 INJECTION INTRAMUSCULAR; INTRAVENOUS EVERY 6 HOURS PRN
Status: DISCONTINUED | OUTPATIENT
Start: 2020-01-01 | End: 2020-01-01 | Stop reason: HOSPADM

## 2020-01-01 RX ORDER — HEPARIN SODIUM 1000 [USP'U]/ML
4600 INJECTION, SOLUTION INTRAVENOUS; SUBCUTANEOUS PRN
Status: DISCONTINUED | OUTPATIENT
Start: 2020-01-01 | End: 2020-01-01

## 2020-01-01 RX ORDER — XENON XE-133 10 MCI/1
17 GAS RESPIRATORY (INHALATION)
Status: COMPLETED | OUTPATIENT
Start: 2020-01-01 | End: 2020-01-01

## 2020-01-01 RX ORDER — HEPARIN SODIUM 5000 [USP'U]/ML
5000 INJECTION, SOLUTION INTRAVENOUS; SUBCUTANEOUS EVERY 8 HOURS SCHEDULED
Status: DISCONTINUED | OUTPATIENT
Start: 2020-01-01 | End: 2020-01-01

## 2020-01-01 RX ORDER — FENTANYL CITRATE 50 UG/ML
50 INJECTION, SOLUTION INTRAMUSCULAR; INTRAVENOUS EVERY 30 MIN PRN
Status: COMPLETED | OUTPATIENT
Start: 2020-01-01 | End: 2020-01-01

## 2020-01-01 RX ORDER — POTASSIUM CHLORIDE 7.45 MG/ML
10 INJECTION INTRAVENOUS
Status: COMPLETED | OUTPATIENT
Start: 2020-01-01 | End: 2020-01-01

## 2020-01-01 RX ORDER — METOPROLOL SUCCINATE 25 MG/1
25 TABLET, EXTENDED RELEASE ORAL DAILY
Status: DISCONTINUED | OUTPATIENT
Start: 2020-01-01 | End: 2020-01-01

## 2020-01-01 RX ORDER — 0.9 % SODIUM CHLORIDE 0.9 %
2000 INTRAVENOUS SOLUTION INTRAVENOUS ONCE
Status: COMPLETED | OUTPATIENT
Start: 2020-01-01 | End: 2020-01-01

## 2020-01-01 RX ORDER — SODIUM CHLORIDE 0.9 % (FLUSH) 0.9 %
10 SYRINGE (ML) INJECTION PRN
Status: DISCONTINUED | OUTPATIENT
Start: 2020-01-01 | End: 2020-01-01 | Stop reason: SDUPTHER

## 2020-01-01 RX ORDER — 0.9 % SODIUM CHLORIDE 0.9 %
500 INTRAVENOUS SOLUTION INTRAVENOUS ONCE
Status: COMPLETED | OUTPATIENT
Start: 2020-01-01 | End: 2020-01-01

## 2020-01-01 RX ORDER — PANTOPRAZOLE SODIUM 20 MG/1
20 TABLET, DELAYED RELEASE ORAL
Status: DISCONTINUED | OUTPATIENT
Start: 2020-01-01 | End: 2020-01-01 | Stop reason: HOSPADM

## 2020-01-01 RX ORDER — GABAPENTIN 300 MG/1
300 CAPSULE ORAL 3 TIMES DAILY
Status: DISCONTINUED | OUTPATIENT
Start: 2020-01-01 | End: 2020-01-01 | Stop reason: HOSPADM

## 2020-01-01 RX ORDER — METHYLPREDNISOLONE SODIUM SUCCINATE 40 MG/ML
40 INJECTION, POWDER, LYOPHILIZED, FOR SOLUTION INTRAMUSCULAR; INTRAVENOUS EVERY 6 HOURS
Status: DISCONTINUED | OUTPATIENT
Start: 2020-01-01 | End: 2020-01-01 | Stop reason: HOSPADM

## 2020-01-01 RX ORDER — MECLIZINE HCL 12.5 MG/1
25 TABLET ORAL DAILY
Status: DISCONTINUED | OUTPATIENT
Start: 2020-01-01 | End: 2020-01-01 | Stop reason: HOSPADM

## 2020-01-01 RX ORDER — SODIUM CHLORIDE 450 MG/100ML
INJECTION, SOLUTION INTRAVENOUS CONTINUOUS
Status: DISCONTINUED | OUTPATIENT
Start: 2020-01-01 | End: 2020-01-01

## 2020-01-01 RX ADMIN — CEFTRIAXONE SODIUM 1 G: 1 INJECTION, POWDER, FOR SOLUTION INTRAMUSCULAR; INTRAVENOUS at 12:15

## 2020-01-01 RX ADMIN — SODIUM CHLORIDE, PRESERVATIVE FREE 10 ML: 5 INJECTION INTRAVENOUS at 09:54

## 2020-01-01 RX ADMIN — AZITHROMYCIN 500 MG: 500 INJECTION, POWDER, LYOPHILIZED, FOR SOLUTION INTRAVENOUS at 11:06

## 2020-01-01 RX ADMIN — AZITHROMYCIN MONOHYDRATE 500 MG: 500 INJECTION, POWDER, LYOPHILIZED, FOR SOLUTION INTRAVENOUS at 12:16

## 2020-01-01 RX ADMIN — Medication 10 ML: at 21:18

## 2020-01-01 RX ADMIN — Medication 10 ML: at 09:00

## 2020-01-01 RX ADMIN — METHYLPREDNISOLONE SODIUM SUCCINATE 40 MG: 40 INJECTION, POWDER, LYOPHILIZED, FOR SOLUTION INTRAMUSCULAR; INTRAVENOUS at 06:40

## 2020-01-01 RX ADMIN — METHYLPREDNISOLONE SODIUM SUCCINATE 40 MG: 40 INJECTION, POWDER, LYOPHILIZED, FOR SOLUTION INTRAMUSCULAR; INTRAVENOUS at 13:31

## 2020-01-01 RX ADMIN — SODIUM CHLORIDE, PRESERVATIVE FREE 10 ML: 5 INJECTION INTRAVENOUS at 19:58

## 2020-01-01 RX ADMIN — SODIUM CHLORIDE, PRESERVATIVE FREE 10 ML: 5 INJECTION INTRAVENOUS at 20:38

## 2020-01-01 RX ADMIN — MORPHINE SULFATE 1 MG: 2 INJECTION, SOLUTION INTRAMUSCULAR; INTRAVENOUS at 02:01

## 2020-01-01 RX ADMIN — GABAPENTIN 300 MG: 300 CAPSULE ORAL at 09:30

## 2020-01-01 RX ADMIN — HEPARIN SODIUM 5000 UNITS: 5000 INJECTION INTRAVENOUS; SUBCUTANEOUS at 06:57

## 2020-01-01 RX ADMIN — SERTRALINE HYDROCHLORIDE 100 MG: 50 TABLET ORAL at 09:41

## 2020-01-01 RX ADMIN — MECLIZINE 25 MG: 12.5 TABLET ORAL at 10:17

## 2020-01-01 RX ADMIN — SERTRALINE HYDROCHLORIDE 100 MG: 50 TABLET ORAL at 09:53

## 2020-01-01 RX ADMIN — POTASSIUM CHLORIDE: 2 INJECTION, SOLUTION, CONCENTRATE INTRAVENOUS at 00:52

## 2020-01-01 RX ADMIN — METOPROLOL TARTRATE 2.5 MG: 5 INJECTION INTRAVENOUS at 20:38

## 2020-01-01 RX ADMIN — METOPROLOL TARTRATE 2.5 MG: 5 INJECTION INTRAVENOUS at 09:22

## 2020-01-01 RX ADMIN — Medication 10 ML: at 00:52

## 2020-01-01 RX ADMIN — ROPINIROLE HYDROCHLORIDE 0.5 MG: 0.5 TABLET, FILM COATED ORAL at 22:04

## 2020-01-01 RX ADMIN — REMDESIVIR 100 MG: 100 INJECTION, POWDER, LYOPHILIZED, FOR SOLUTION INTRAVENOUS at 14:20

## 2020-01-01 RX ADMIN — AZITHROMYCIN 500 MG: 500 INJECTION, POWDER, LYOPHILIZED, FOR SOLUTION INTRAVENOUS at 10:46

## 2020-01-01 RX ADMIN — METHYLPREDNISOLONE SODIUM SUCCINATE 40 MG: 40 INJECTION, POWDER, LYOPHILIZED, FOR SOLUTION INTRAMUSCULAR; INTRAVENOUS at 02:03

## 2020-01-01 RX ADMIN — SODIUM CHLORIDE, SODIUM LACTATE, POTASSIUM CHLORIDE, CALCIUM CHLORIDE AND DEXTROSE MONOHYDRATE: 5; 600; 310; 30; 20 INJECTION, SOLUTION INTRAVENOUS at 11:04

## 2020-01-01 RX ADMIN — POTASSIUM BICARBONATE 40 MEQ: 782 TABLET, EFFERVESCENT ORAL at 09:29

## 2020-01-01 RX ADMIN — POTASSIUM CHLORIDE 10 MEQ: 10 INJECTION, SOLUTION INTRAVENOUS at 17:26

## 2020-01-01 RX ADMIN — Medication 10 ML: at 23:28

## 2020-01-01 RX ADMIN — FENTANYL CITRATE 50 MCG: 50 INJECTION, SOLUTION INTRAMUSCULAR; INTRAVENOUS at 16:16

## 2020-01-01 RX ADMIN — REMDESIVIR 100 MG: 100 INJECTION, POWDER, LYOPHILIZED, FOR SOLUTION INTRAVENOUS at 16:30

## 2020-01-01 RX ADMIN — HEPARIN SODIUM 5000 UNITS: 5000 INJECTION INTRAVENOUS; SUBCUTANEOUS at 21:58

## 2020-01-01 RX ADMIN — HEPARIN SODIUM 5000 UNITS: 5000 INJECTION INTRAVENOUS; SUBCUTANEOUS at 22:09

## 2020-01-01 RX ADMIN — Medication 10 ML: at 08:30

## 2020-01-01 RX ADMIN — HEPARIN SODIUM 4600 UNITS: 1000 INJECTION INTRAVENOUS; SUBCUTANEOUS at 04:46

## 2020-01-01 RX ADMIN — METOPROLOL SUCCINATE 75 MG: 50 TABLET, EXTENDED RELEASE ORAL at 10:17

## 2020-01-01 RX ADMIN — METOPROLOL SUCCINATE 75 MG: 50 TABLET, EXTENDED RELEASE ORAL at 09:41

## 2020-01-01 RX ADMIN — ACETAMINOPHEN 650 MG: 325 TABLET ORAL at 10:33

## 2020-01-01 RX ADMIN — METOPROLOL TARTRATE 2.5 MG: 5 INJECTION INTRAVENOUS at 20:55

## 2020-01-01 RX ADMIN — COLLAGENASE SANTYL: 250 OINTMENT TOPICAL at 15:14

## 2020-01-01 RX ADMIN — CEFTRIAXONE SODIUM 1 G: 1 INJECTION, POWDER, FOR SOLUTION INTRAMUSCULAR; INTRAVENOUS at 09:48

## 2020-01-01 RX ADMIN — PANTOPRAZOLE SODIUM 20 MG: 20 TABLET, DELAYED RELEASE ORAL at 05:27

## 2020-01-01 RX ADMIN — ACETAMINOPHEN 650 MG: 325 TABLET ORAL at 21:17

## 2020-01-01 RX ADMIN — METHYLPREDNISOLONE SODIUM SUCCINATE 40 MG: 40 INJECTION, POWDER, LYOPHILIZED, FOR SOLUTION INTRAMUSCULAR; INTRAVENOUS at 01:30

## 2020-01-01 RX ADMIN — HEPARIN SODIUM 5000 UNITS: 5000 INJECTION INTRAVENOUS; SUBCUTANEOUS at 21:22

## 2020-01-01 RX ADMIN — SODIUM CHLORIDE 500 ML: 9 INJECTION, SOLUTION INTRAVENOUS at 10:53

## 2020-01-01 RX ADMIN — METHYLPREDNISOLONE SODIUM SUCCINATE 40 MG: 40 INJECTION, POWDER, FOR SOLUTION INTRAMUSCULAR; INTRAVENOUS at 12:54

## 2020-01-01 RX ADMIN — HEPARIN SODIUM 10.4 ML/HR: 10000 INJECTION, SOLUTION INTRAVENOUS at 04:46

## 2020-01-01 RX ADMIN — SODIUM CHLORIDE, PRESERVATIVE FREE 10 ML: 5 INJECTION INTRAVENOUS at 20:44

## 2020-01-01 RX ADMIN — METHYLPREDNISOLONE SODIUM SUCCINATE 40 MG: 40 INJECTION, POWDER, LYOPHILIZED, FOR SOLUTION INTRAMUSCULAR; INTRAVENOUS at 09:21

## 2020-01-01 RX ADMIN — POTASSIUM CHLORIDE: 2 INJECTION, SOLUTION, CONCENTRATE INTRAVENOUS at 11:07

## 2020-01-01 RX ADMIN — METHYLPREDNISOLONE SODIUM SUCCINATE 40 MG: 40 INJECTION, POWDER, LYOPHILIZED, FOR SOLUTION INTRAMUSCULAR; INTRAVENOUS at 18:07

## 2020-01-01 RX ADMIN — METHYLPREDNISOLONE SODIUM SUCCINATE 40 MG: 40 INJECTION, POWDER, LYOPHILIZED, FOR SOLUTION INTRAMUSCULAR; INTRAVENOUS at 15:47

## 2020-01-01 RX ADMIN — HEPARIN SODIUM 5000 UNITS: 5000 INJECTION INTRAVENOUS; SUBCUTANEOUS at 06:39

## 2020-01-01 RX ADMIN — HEPARIN SODIUM 5000 UNITS: 5000 INJECTION INTRAVENOUS; SUBCUTANEOUS at 15:28

## 2020-01-01 RX ADMIN — HEPARIN SODIUM 5000 UNITS: 5000 INJECTION INTRAVENOUS; SUBCUTANEOUS at 09:22

## 2020-01-01 RX ADMIN — COLLAGENASE SANTYL: 250 OINTMENT TOPICAL at 09:37

## 2020-01-01 RX ADMIN — METOPROLOL TARTRATE 2.5 MG: 5 INJECTION INTRAVENOUS at 21:43

## 2020-01-01 RX ADMIN — METOPROLOL SUCCINATE 25 MG: 25 TABLET, EXTENDED RELEASE ORAL at 09:53

## 2020-01-01 RX ADMIN — GABAPENTIN 300 MG: 300 CAPSULE ORAL at 22:04

## 2020-01-01 RX ADMIN — Medication 10 ML: at 22:05

## 2020-01-01 RX ADMIN — ROPINIROLE HYDROCHLORIDE 0.5 MG: 0.5 TABLET, FILM COATED ORAL at 21:17

## 2020-01-01 RX ADMIN — METHYLPREDNISOLONE SODIUM SUCCINATE 40 MG: 40 INJECTION, POWDER, LYOPHILIZED, FOR SOLUTION INTRAMUSCULAR; INTRAVENOUS at 00:23

## 2020-01-01 RX ADMIN — XENON XE-133 17 MILLICURIE: 10 GAS RESPIRATORY (INHALATION) at 13:23

## 2020-01-01 RX ADMIN — HEPARIN SODIUM 5000 UNITS: 5000 INJECTION INTRAVENOUS; SUBCUTANEOUS at 23:45

## 2020-01-01 RX ADMIN — ACETAMINOPHEN 650 MG: 325 TABLET ORAL at 21:59

## 2020-01-01 RX ADMIN — METOPROLOL TARTRATE 2.5 MG: 5 INJECTION INTRAVENOUS at 09:32

## 2020-01-01 RX ADMIN — SODIUM CHLORIDE, PRESERVATIVE FREE 10 ML: 5 INJECTION INTRAVENOUS at 20:37

## 2020-01-01 RX ADMIN — CEFTRIAXONE SODIUM 1 G: 1 INJECTION, POWDER, FOR SOLUTION INTRAMUSCULAR; INTRAVENOUS at 09:46

## 2020-01-01 RX ADMIN — Medication 10 ML: at 09:37

## 2020-01-01 RX ADMIN — SODIUM CHLORIDE, PRESERVATIVE FREE 10 ML: 5 INJECTION INTRAVENOUS at 20:56

## 2020-01-01 RX ADMIN — HEPARIN SODIUM 5000 UNITS: 5000 INJECTION INTRAVENOUS; SUBCUTANEOUS at 06:40

## 2020-01-01 RX ADMIN — SODIUM CHLORIDE: 9 INJECTION, SOLUTION INTRAVENOUS at 05:23

## 2020-01-01 RX ADMIN — MORPHINE SULFATE 2 MG: 2 INJECTION, SOLUTION INTRAMUSCULAR; INTRAVENOUS at 02:55

## 2020-01-01 RX ADMIN — SODIUM CHLORIDE, SODIUM LACTATE, POTASSIUM CHLORIDE, CALCIUM CHLORIDE AND DEXTROSE MONOHYDRATE: 5; 600; 310; 30; 20 INJECTION, SOLUTION INTRAVENOUS at 02:02

## 2020-01-01 RX ADMIN — HEPARIN SODIUM 5000 UNITS: 5000 INJECTION INTRAVENOUS; SUBCUTANEOUS at 15:48

## 2020-01-01 RX ADMIN — COLLAGENASE SANTYL: 250 OINTMENT TOPICAL at 09:22

## 2020-01-01 RX ADMIN — SODIUM CHLORIDE, PRESERVATIVE FREE 10 ML: 5 INJECTION INTRAVENOUS at 19:59

## 2020-01-01 RX ADMIN — GABAPENTIN 300 MG: 300 CAPSULE ORAL at 09:53

## 2020-01-01 RX ADMIN — SODIUM BICARBONATE: 84 INJECTION, SOLUTION INTRAVENOUS at 15:28

## 2020-01-01 RX ADMIN — METOPROLOL TARTRATE 2.5 MG: 5 INJECTION INTRAVENOUS at 09:43

## 2020-01-01 RX ADMIN — COLLAGENASE SANTYL: 250 OINTMENT TOPICAL at 09:44

## 2020-01-01 RX ADMIN — CEFTRIAXONE SODIUM 1 G: 1 INJECTION, POWDER, FOR SOLUTION INTRAMUSCULAR; INTRAVENOUS at 10:12

## 2020-01-01 RX ADMIN — SODIUM CHLORIDE, PRESERVATIVE FREE 10 ML: 5 INJECTION INTRAVENOUS at 22:21

## 2020-01-01 RX ADMIN — Medication 10 ML: at 21:43

## 2020-01-01 RX ADMIN — SODIUM CHLORIDE, PRESERVATIVE FREE 10 ML: 5 INJECTION INTRAVENOUS at 09:34

## 2020-01-01 RX ADMIN — SODIUM CHLORIDE 500 ML: 9 INJECTION, SOLUTION INTRAVENOUS at 11:51

## 2020-01-01 RX ADMIN — METHYLPREDNISOLONE SODIUM SUCCINATE 40 MG: 40 INJECTION, POWDER, LYOPHILIZED, FOR SOLUTION INTRAMUSCULAR; INTRAVENOUS at 13:10

## 2020-01-01 RX ADMIN — Medication 6 MILLICURIE: at 13:24

## 2020-01-01 RX ADMIN — ENOXAPARIN SODIUM 40 MG: 40 INJECTION SUBCUTANEOUS at 09:53

## 2020-01-01 RX ADMIN — Medication 10 ML: at 10:18

## 2020-01-01 RX ADMIN — METOPROLOL TARTRATE 2.5 MG: 5 INJECTION INTRAVENOUS at 19:54

## 2020-01-01 RX ADMIN — SODIUM BICARBONATE: 84 INJECTION, SOLUTION INTRAVENOUS at 09:08

## 2020-01-01 RX ADMIN — COLLAGENASE SANTYL: 250 OINTMENT TOPICAL at 09:07

## 2020-01-01 RX ADMIN — MAGNESIUM SULFATE HEPTAHYDRATE 1 G: 1 INJECTION, SOLUTION INTRAVENOUS at 10:48

## 2020-01-01 RX ADMIN — HEPARIN SODIUM 5000 UNITS: 5000 INJECTION INTRAVENOUS; SUBCUTANEOUS at 05:23

## 2020-01-01 RX ADMIN — DEXTROSE MONOHYDRATE: 50 INJECTION, SOLUTION INTRAVENOUS at 01:45

## 2020-01-01 RX ADMIN — SERTRALINE HYDROCHLORIDE 100 MG: 50 TABLET ORAL at 10:16

## 2020-01-01 RX ADMIN — SODIUM CHLORIDE: 4.5 INJECTION, SOLUTION INTRAVENOUS at 18:08

## 2020-01-01 RX ADMIN — ATORVASTATIN CALCIUM 40 MG: 40 TABLET, FILM COATED ORAL at 21:17

## 2020-01-01 RX ADMIN — HEPARIN SODIUM 5000 UNITS: 5000 INJECTION INTRAVENOUS; SUBCUTANEOUS at 21:16

## 2020-01-01 RX ADMIN — MECLIZINE 25 MG: 12.5 TABLET ORAL at 09:41

## 2020-01-01 RX ADMIN — DEXTROSE MONOHYDRATE: 50 INJECTION, SOLUTION INTRAVENOUS at 16:35

## 2020-01-01 RX ADMIN — MECLIZINE 25 MG: 12.5 TABLET ORAL at 09:53

## 2020-01-01 RX ADMIN — METOPROLOL TARTRATE 2.5 MG: 5 INJECTION INTRAVENOUS at 11:55

## 2020-01-01 RX ADMIN — CEFTRIAXONE SODIUM 1 G: 1 INJECTION, POWDER, FOR SOLUTION INTRAMUSCULAR; INTRAVENOUS at 10:05

## 2020-01-01 RX ADMIN — HEPARIN SODIUM 5000 UNITS: 5000 INJECTION INTRAVENOUS; SUBCUTANEOUS at 13:32

## 2020-01-01 RX ADMIN — IOPAMIDOL 75 ML: 755 INJECTION, SOLUTION INTRAVENOUS at 16:38

## 2020-01-01 RX ADMIN — HEPARIN SODIUM 5000 UNITS: 5000 INJECTION INTRAVENOUS; SUBCUTANEOUS at 13:10

## 2020-01-01 RX ADMIN — GABAPENTIN 300 MG: 300 CAPSULE ORAL at 21:17

## 2020-01-01 RX ADMIN — ONDANSETRON 4 MG: 2 INJECTION INTRAMUSCULAR; INTRAVENOUS at 16:17

## 2020-01-01 RX ADMIN — HEPARIN SODIUM 5000 UNITS: 5000 INJECTION INTRAVENOUS; SUBCUTANEOUS at 00:06

## 2020-01-01 RX ADMIN — REMDESIVIR 200 MG: 100 INJECTION, POWDER, LYOPHILIZED, FOR SOLUTION INTRAVENOUS at 17:17

## 2020-01-01 RX ADMIN — SODIUM CHLORIDE, PRESERVATIVE FREE 10 ML: 5 INJECTION INTRAVENOUS at 09:47

## 2020-01-01 RX ADMIN — AZITHROMYCIN 500 MG: 500 INJECTION, POWDER, LYOPHILIZED, FOR SOLUTION INTRAVENOUS at 09:43

## 2020-01-01 RX ADMIN — REMDESIVIR 100 MG: 100 INJECTION, POWDER, LYOPHILIZED, FOR SOLUTION INTRAVENOUS at 15:47

## 2020-01-01 RX ADMIN — HEPARIN SODIUM 5000 UNITS: 5000 INJECTION INTRAVENOUS; SUBCUTANEOUS at 15:14

## 2020-01-01 RX ADMIN — Medication 10 ML: at 20:43

## 2020-01-01 RX ADMIN — GABAPENTIN 300 MG: 300 CAPSULE ORAL at 13:46

## 2020-01-01 RX ADMIN — SODIUM CHLORIDE, PRESERVATIVE FREE 10 ML: 5 INJECTION INTRAVENOUS at 09:44

## 2020-01-01 RX ADMIN — Medication 10 ML: at 19:59

## 2020-01-01 RX ADMIN — FENTANYL CITRATE 50 MCG: 50 INJECTION, SOLUTION INTRAMUSCULAR; INTRAVENOUS at 23:26

## 2020-01-01 RX ADMIN — ACETAMINOPHEN 650 MG: 325 TABLET ORAL at 01:54

## 2020-01-01 RX ADMIN — ATORVASTATIN CALCIUM 40 MG: 40 TABLET, FILM COATED ORAL at 22:04

## 2020-01-01 RX ADMIN — ENOXAPARIN SODIUM 40 MG: 40 INJECTION SUBCUTANEOUS at 09:30

## 2020-01-01 RX ADMIN — SODIUM CHLORIDE, PRESERVATIVE FREE 10 ML: 5 INJECTION INTRAVENOUS at 10:11

## 2020-01-01 RX ADMIN — SODIUM CHLORIDE 2000 ML: 9 INJECTION, SOLUTION INTRAVENOUS at 15:05

## 2020-01-01 RX ADMIN — ACETAMINOPHEN 650 MG: 325 TABLET ORAL at 06:15

## 2020-01-01 RX ADMIN — PANTOPRAZOLE SODIUM 20 MG: 20 TABLET, DELAYED RELEASE ORAL at 05:59

## 2020-01-01 RX ADMIN — METHYLPREDNISOLONE SODIUM SUCCINATE 40 MG: 40 INJECTION, POWDER, LYOPHILIZED, FOR SOLUTION INTRAMUSCULAR; INTRAVENOUS at 19:54

## 2020-01-01 RX ADMIN — ENOXAPARIN SODIUM 40 MG: 40 INJECTION SUBCUTANEOUS at 10:17

## 2020-01-01 RX ADMIN — METHYLPREDNISOLONE SODIUM SUCCINATE 40 MG: 40 INJECTION, POWDER, LYOPHILIZED, FOR SOLUTION INTRAMUSCULAR; INTRAVENOUS at 20:55

## 2020-01-01 RX ADMIN — AZITHROMYCIN 500 MG: 500 INJECTION, POWDER, LYOPHILIZED, FOR SOLUTION INTRAVENOUS at 08:30

## 2020-01-01 RX ADMIN — POTASSIUM CHLORIDE: 2 INJECTION, SOLUTION, CONCENTRATE INTRAVENOUS at 10:47

## 2020-01-01 RX ADMIN — POTASSIUM CHLORIDE 10 MEQ: 10 INJECTION, SOLUTION INTRAVENOUS at 16:34

## 2020-01-01 RX ADMIN — METOPROLOL TARTRATE 2.5 MG: 5 INJECTION INTRAVENOUS at 09:07

## 2020-01-01 RX ADMIN — SODIUM CHLORIDE, PRESERVATIVE FREE 10 ML: 5 INJECTION INTRAVENOUS at 09:08

## 2020-01-01 RX ADMIN — Medication 10 ML: at 09:54

## 2020-01-01 RX ADMIN — SODIUM CHLORIDE, PRESERVATIVE FREE 10 ML: 5 INJECTION INTRAVENOUS at 08:31

## 2020-01-01 RX ADMIN — METHYLPREDNISOLONE SODIUM SUCCINATE 40 MG: 40 INJECTION, POWDER, LYOPHILIZED, FOR SOLUTION INTRAMUSCULAR; INTRAVENOUS at 00:52

## 2020-01-01 RX ADMIN — METHYLPREDNISOLONE SODIUM SUCCINATE 40 MG: 40 INJECTION, POWDER, LYOPHILIZED, FOR SOLUTION INTRAMUSCULAR; INTRAVENOUS at 06:57

## 2020-01-01 RX ADMIN — METHYLPREDNISOLONE SODIUM SUCCINATE 40 MG: 40 INJECTION, POWDER, LYOPHILIZED, FOR SOLUTION INTRAMUSCULAR; INTRAVENOUS at 06:26

## 2020-01-01 RX ADMIN — SODIUM CHLORIDE: 9 INJECTION, SOLUTION INTRAVENOUS at 20:43

## 2020-01-01 RX ADMIN — PANTOPRAZOLE SODIUM 20 MG: 20 TABLET, DELAYED RELEASE ORAL at 05:08

## 2020-01-01 RX ADMIN — PROMETHAZINE HYDROCHLORIDE 12.5 MG: 25 TABLET ORAL at 21:45

## 2020-01-01 RX ADMIN — COLLAGENASE SANTYL: 250 OINTMENT TOPICAL at 17:32

## 2020-01-01 RX ADMIN — COLLAGENASE SANTYL: 250 OINTMENT TOPICAL at 10:12

## 2020-01-01 RX ADMIN — MORPHINE SULFATE 2 MG: 2 INJECTION, SOLUTION INTRAMUSCULAR; INTRAVENOUS at 23:28

## 2020-01-01 ASSESSMENT — ENCOUNTER SYMPTOMS
ABDOMINAL PAIN: 1
BACK PAIN: 0
VOMITING: 0
NAUSEA: 1
EYE PAIN: 0
SORE THROAT: 0
TACHYPNEA: 1
SHORTNESS OF BREATH: 0

## 2020-01-01 ASSESSMENT — PAIN SCALES - GENERAL
PAINLEVEL_OUTOF10: 0
PAINLEVEL_OUTOF10: 4
PAINLEVEL_OUTOF10: 7
PAINLEVEL_OUTOF10: 5
PAINLEVEL_OUTOF10: 6
PAINLEVEL_OUTOF10: 6
PAINLEVEL_OUTOF10: 0
PAINLEVEL_OUTOF10: 6
PAINLEVEL_OUTOF10: 0
PAINLEVEL_OUTOF10: 0
PAINLEVEL_OUTOF10: 6
PAINLEVEL_OUTOF10: 3
PAINLEVEL_OUTOF10: 0
PAINLEVEL_OUTOF10: 0
PAINLEVEL_OUTOF10: 5
PAINLEVEL_OUTOF10: 0
PAINLEVEL_OUTOF10: 0
PAINLEVEL_OUTOF10: 3
PAINLEVEL_OUTOF10: 0
PAINLEVEL_OUTOF10: 9
PAINLEVEL_OUTOF10: 3
PAINLEVEL_OUTOF10: 0
PAINLEVEL_OUTOF10: 5
PAINLEVEL_OUTOF10: 7
PAINLEVEL_OUTOF10: 6
PAINLEVEL_OUTOF10: 0
PAINLEVEL_OUTOF10: 0
PAINLEVEL_OUTOF10: 3
PAINLEVEL_OUTOF10: 0
PAINLEVEL_OUTOF10: 8
PAINLEVEL_OUTOF10: 8

## 2020-01-01 ASSESSMENT — PAIN DESCRIPTION - PAIN TYPE
TYPE: CHRONIC PAIN
TYPE: ACUTE PAIN

## 2020-01-01 ASSESSMENT — PAIN DESCRIPTION - LOCATION
LOCATION: BACK;LEG
LOCATION: BACK
LOCATION: BACK
LOCATION: SHOULDER;HEAD;NECK
LOCATION: BACK
LOCATION: BACK
LOCATION: HEAD

## 2020-01-01 ASSESSMENT — PAIN SCALES - WONG BAKER
WONGBAKER_NUMERICALRESPONSE: 0

## 2020-01-01 ASSESSMENT — PAIN DESCRIPTION - ORIENTATION: ORIENTATION: MID

## 2020-01-01 ASSESSMENT — PAIN DESCRIPTION - DESCRIPTORS: DESCRIPTORS: PATIENT UNABLE TO DESCRIBE

## 2020-10-05 PROBLEM — R53.1 GENERALIZED WEAKNESS: Status: ACTIVE | Noted: 2020-01-01

## 2020-10-05 PROBLEM — R07.9 CHEST PAIN: Status: ACTIVE | Noted: 2020-01-01

## 2020-10-05 NOTE — ED PROVIDER NOTES
I independently performed a history and physical on [address] [place] [address]. All diagnostic, treatment, and disposition decisions were made by myself in conjunction with the advanced practice provider. Briefly, this is a 79 y.o. female here for chest pain and falls. Chest pain has been ongoing throughout the entire day. She has had multiple falls this past month. Her family was unable to convince her to come into the emergency room until now. .    On exam,   General: Patient is weak appearing  Skin: No cyanosis  HEENT: Moist mucous membranes  Heart: Regular rate, regular rhythm  Lung: No respiratory distress  Abdomen: Soft, nontender  Neuro: Moving all extremities, no facial droop, no slurred speech, answers questions appropriately        EKG  The Ekg interpreted by me in the absence of a cardiologist shows. A paced rhythm  No acute ST changes or T wave abnormalities   Motion artifact    Screenings   Porsche Coma Scale  Eye Opening: Spontaneous  Best Verbal Response: Oriented  Best Motor Response: Obeys commands  Porsche Coma Scale Score: 15        MDM  Patient is a 52-year-old woman who presents with chest pain and fall. Has had multiple falls. ACS is on differential.  Initial troponin negative. EKG shows a paced rhythm without evidence of acute ischemia. Obtain head and C-spine CT which did not show any bleed. No tachycardia, tachypnea, hypoxemia, or unilateral DVT symptoms to indicate pulmonary embolism. No back pain or unequal pulses or significant mediastinal widening to indicate dissection. Given ambulatory dysfunction and cardiac work-up, our plan will be to admit the patient to hospitalist service. Patient Referrals:  No follow-up provider specified. Discharge Medications:  New Prescriptions    No medications on file       FINAL IMPRESSION  1. Chest pain, unspecified type    2.  General weakness        Blood pressure (!) 170/83, pulse 60, temperature 97.4 °F (36.3 °C), temperature source

## 2020-10-05 NOTE — ED NOTES

## 2020-10-05 NOTE — ED NOTES
Attempted to call patients daughter at this time x2. No answer.       Abdoul Villa, RN  10/05/20 7039

## 2020-10-05 NOTE — ED NOTES

## 2020-10-05 NOTE — ED NOTES
[facility] @ 7264   Re:  Admission request for chest pain, falls per brent Wiggins @ [facility]  10/05/20 4769

## 2020-10-05 NOTE — ED NOTES
With patients permission, soiled clothing cut off and thrown in garbage. Patient provided with clean gown and hospital socks. Patient cleaned with warm wipes.       Idris Shine RN  10/05/20 7789

## 2020-10-05 NOTE — ED NOTES
Bed: 08  Expected date:   Expected time:   Means of arrival:   Comments:  m48     Basilio John RN  10/05/20 150

## 2020-10-06 NOTE — PROGRESS NOTES
Occupational Therapy   Occupational Therapy Initial Assessment  Date: 10/6/2020   Patient Name: Babak Pabon  MRN: [id number]     : 1940    Date of Service: 10/6/2020    Discharge Recommendations:  3-5 sessions per week; Bruno Carrington   OT Equipment Recommendations  Other: defer to next level of care     Babak Pabon scored a 15/24 on the AM-PAC ADL Inpatient form. Current research shows that an AM-PAC score of 17 or less is typically not associated with a discharge to the patient's home setting. Based on the patient's AM-PAC score and their current ADL deficits, it is recommended that the patient have 3-5 sessions per week of Occupational Therapy at d/c to increase the patient's independence. Please see assessment section for further patient specific details. If patient discharges prior to next session this note will serve as a discharge summary. Please see below for the latest assessment towards goals. Assessment   Performance deficits / Impairments: Decreased functional mobility ; Decreased endurance;Decreased ADL status; Decreased balance;Decreased strength;Decreased safe awareness;Decreased cognition  Assessment: Patient is a 73yr old female admitted for falls. Patient reports multiple falls at home. Patient required moderate A for stand pivot transfer. Anticipate assistance x2 for functional mobility to bathroom. Patient unsafe to return home at this time and would benefit from ongoing OT in order to increase functional status. Prognosis: Fair  Decision Making: Medium Complexity  OT Education: OT Role;Plan of Care;Family Education;Orientation;Precautions; ADL Adaptive Strategies;Transfer Training;Energy Conservation  Patient Education: concerns with d/c home, d/c recommendations  Barriers to Learning: cognition,  REQUIRES OT FOLLOW UP: Yes  Activity Tolerance  Activity Tolerance: Patient limited by fatigue  Safety Devices  Safety Devices in place: Yes  Type of devices: All fall risk precautions in place; Left in chair;Call light within reach;Nurse notified; Chair alarm in place; Patient at risk for falls           Patient Diagnosis(es): The primary encounter diagnosis was Chest pain, unspecified type. A diagnosis of General weakness was also pertinent to this visit. has a past medical history of Arthritis, CAD (coronary artery disease), History of blood transfusion, Hypertension, Orthostatic hypotension, and Pacemaker. has a past surgical history that includes joint replacement; Cardiac surgery; Hysterectomy; Upper gastrointestinal endoscopy; pr egd flex removal lesion(s) by hot biopsy forceps (11/12/2018); and Upper gastrointestinal endoscopy (N/A, 11/12/2018). Restrictions  Restrictions/Precautions  Restrictions/Precautions: General Precautions, Fall Risk, Up as Tolerated  Position Activity Restriction  Other position/activity restrictions: High fall risk, [place], 1.5L O2 via NC on arrival    Subjective   General  Chart Reviewed: Yes, Imaging, Orders, Progress Notes, History and Physical, Previous Admission, Labs  Patient assessed for rehabilitation services?: Yes  Family / Caregiver Present: Yes(daughter)  Referring Practitioner: Rianna Dawson MD  Diagnosis: Falls  Subjective  Subjective: Patient pleasant and agreeable to OT. Patient's daughter unable to recall number of falls at home. Patient reporting EMS comes around 1x a week for falls.   Patient Currently in Pain: Denies  Vital Signs  Patient Currently in Pain: Denies  Social/Functional History  Social/Functional History  Lives With: Daughter(Works, no 24/7)  Type of Home: Apartment(1st floor)  Home Layout: One level  Home Access: Level entry  Bathroom Shower/Tub: Walk-in shower, Shower chair with back  Bathroom Toilet: Standard  Bathroom Equipment: Grab bars in shower  Home Equipment: Rolling walker, 4 wheeled walker  ADL Assistance: Needs assistance(Assist to wash back and for upper body dressing)  Homemaking Assistance: Needs assistance(Dtr performs)  Ambulation Assistance: Needs assistance(Prn help, sometimes independent)  Transfer Assistance: Independent  Active : No  Additional Comments: No home O2, sleeps in recliner. Pt initially states she had not had many falls recently, later in session she indicated there had been multiple. Per chart review, EMS is called to house at least 1x/wk. Objective        Orientation  Overall Orientation Status: Impaired  Orientation Level: Disoriented to situation;Oriented to place; Disoriented to time;Oriented to person  Observation/Palpation  Posture: Fair  Observation: Posterior pelvic tilt, R knee skin tear. Edema: RLE edema > LLE  Balance  Sitting Balance: Stand by assistance  Standing Balance: Moderate assistance  Functional Mobility  Functional - Mobility Device: Rolling Walker  Activity: (bed to chair transfer)  Assist Level: Moderate assistance  ADL  LE Dressing: Maximum assistance(OT assist in threading BLEs; patient able to assist in managing brief over hips)  Toileting: Dependent/Total(patient incontinent of urine from [address]; Max-total assistance for clean-up and changing of brief)  Tone RUE  RUE Tone: Normotonic  Tone LUE  LUE Tone: Normotonic  Coordination  Movements Are Fluid And Coordinated: Yes     Bed mobility  Supine to Sit: Contact guard assistance  Comment: increased time and effort with use of bedrails  Transfers  Sit to stand: Moderate assistance  Stand to sit: Moderate assistance  Transfer Comments: EOB up to RW     Cognition  Overall Cognitive Status: Exceptions  Arousal/Alertness: Delayed responses to stimuli  Following Commands: Follows one step commands with increased time; Follows one step commands with repetition  Attention Span: Attends with cues to redirect  Memory: Decreased recall of recent events  Safety Judgement: Decreased awareness of need for assistance  Problem Solving: Assistance required to generate solutions;Assistance required to identify errors made;Decreased awareness of errors  Insights: Decreased awareness of deficits  Initiation: Requires cues for some  Sequencing: Requires cues for some                                        Plan   Plan  Times per week: 3-5x/wk  Current Treatment Recommendations: Patient/Caregiver Education & Training, Strengthening, Balance Training, Functional Mobility Training, Safety Education & Training, Self-Care / ADL, Endurance Training      AM-PAC Score        AM-PAC Inpatient Daily Activity Raw Score: 15 (10/06/20 1406)  AM-PAC Inpatient ADL T-Scale Score : 34.69 (10/06/20 1406)  ADL Inpatient CMS 0-100% Score: 56.46 (10/06/20 1406)  ADL Inpatient CMS G-Code Modifier : CK (10/06/20 1406)    Goals  Short term goals  Time Frame for Short term goals: by d/c ( 1 week 10/13/20)  Short term goal 1: Pt will complete toileting with supervision  Short term goal 2: Pt will complete toilet transfers with supervision  Short term goal 3: Pt will complete functioning mobility to bathroom with LRAD at supervision A  Short term goal 4: Pt will tolerate x3 mins of functional standing ADL at sink       Therapy Time   Individual Concurrent Group Co-treatment   Time In 1140         Time Out 1215         Minutes 35         Timed Code Treatment Minutes: DEANNA Millan/L

## 2020-10-06 NOTE — PROGRESS NOTES
Hospitalist Progress Note      PCP: Alida Arroyo MD    Date of Admission: 10/5/2020    Chief Complaint: Generalized weakness, chest pain    Hospital Course: 79 y.o. female who presented to [facility] with above complaints  Pt with PMHx of HTN, HLD, AFib, obesity, sinus node dysfunction PPM presented to the ER today with complaints of weakness and chest pain. EMS was called to the patient's home to help lift the patient, after the patient slid down to the ground from the chair, and unable to get up. Patient usually declines EMS transport to the ED however decided to come in today as she was also having accompanying chest pain. Patient reports epigastric to retrosternal pain dull aching intermittent that started earlier today with no relation to exertion. Patient reports she usually ambulates with a walker or cane. Daughter at home helps with her care.     Subjective: Working with PT/OT. No further chest pain. Serial troponin negative. Daughter at bedside. Medications:  Reviewed    Infusion Medications   Scheduled Medications    atorvastatin  40 mg Oral Nightly    [Held by provider] gabapentin  300 mg Oral TID    meclizine  25 mg Oral Daily    metoprolol succinate  75 mg Oral Daily    pantoprazole  20 mg Oral QAM AC    [Held by provider] rOPINIRole  0.5 mg Oral Nightly    sertraline  100 mg Oral Daily    sodium chloride flush  10 mL Intravenous 2 times per day    enoxaparin  40 mg Subcutaneous Daily     PRN Meds: fentanNYL, sodium chloride flush, acetaminophen **OR** acetaminophen, polyethylene glycol, promethazine **OR** ondansetron, nitroGLYCERIN    No intake or output data in the 24 hours ending 10/06/20 1428    Physical Exam Performed:    /79   Pulse 61   Temp 97.7 °F (36.5 °C) (Oral)   Resp 16   Ht 4' 10\" (1.473 m)   Wt 200 lb (90.7 kg)   SpO2 98%   BMI 41.80 kg/m²     General appearance: No apparent distress, appears stated age and cooperative.   HEENT: Pupils equal, round, and reactive to light. Conjunctivae/corneas clear. Neck: Supple, with full range of motion. No jugular venous distention. Trachea midline. Respiratory:  Normal respiratory effort. Clear to auscultation, bilaterally without Rales/Wheezes/Rhonchi. Cardiovascular: Regular rate and rhythm with normal S1/S2 without murmurs, rubs or gallops. Abdomen: Soft, non-tender, non-distended with normal bowel sounds. Musculoskeletal: No clubbing, cyanosis or edema bilaterally. Full range of motion without deformity. Skin: Skin color, texture, turgor normal.  No rashes or lesions. Neurologic:  Neurovascularly intact without any focal sensory/motor deficits. Cranial nerves: II-XII intact, grossly non-focal.  Psychiatric: Alert and oriented, thought content appropriate, normal insight  Capillary Refill: Brisk,< 3 seconds   Peripheral Pulses: +2 palpable, equal bilaterally       Labs:   Recent Labs     10/05/20  1525   WBC 12.2*   HGB 14.2   HCT 42.8        Recent Labs     10/05/20  1525      K 4.0      CO2 25   BUN 17   CREATININE 0.8   CALCIUM 9.4     Recent Labs     10/05/20  1525   AST 20   ALT 9*   BILITOT 0.7   ALKPHOS 80     No results for input(s): INR in the last 72 hours. Recent Labs     10/05/20  1525 10/05/20  2128 10/06/20  0058   CKTOTAL 440*  --   --    TROPONINI <0.01 <0.01 <0.01       Urinalysis:      Lab Results   Component Value Date    NITRU POSITIVE 12/12/2019    WBCUA 10-20 12/12/2019    BACTERIA 3+ 12/12/2019    RBCUA 0-2 12/12/2019    BLOODU Negative 12/12/2019    SPECGRAV 1.020 12/12/2019    GLUCOSEU Negative 12/12/2019       Radiology:  CT Head WO Contrast   Final Result   No definite acute intracranial abnormality. CT Cervical Spine WO Contrast   Final Result   No acute abnormality of the cervical spine. CT ABDOMEN PELVIS W IV CONTRAST Additional Contrast? None   Final Result   Negative CT examination.  No evidence of acute process in the abdomen or pelvis. XR CHEST PORTABLE   Final Result   No acute finding or significant change. Assessment/Plan:    Active Hospital Problems    Diagnosis    Chest pain [R07.9]    Generalized weakness [R53.1]    CAD (coronary artery disease) [I25.10]    Weakness generalized [R53.1]    Paroxysmal atrial fibrillation (HCC) [I48.0]    Essential hypertension [I10]    Cardiac pacemaker in situ [Z95.0]     Chest pain, atypical.  Initial EKG and troponin negative. Less likely cardiac. Serial cardiac enzymes are negative. Patient currently chest pain-free. LHC in 2015 showed nonobstructive CAD.      Generalized weakness/deconditioning -PT OT consult, daughter unable to take care of patient at home. May need placement in subacute rehab. Fall precautions ordered     Sinus node dysfunction: stable  -s/p dual pacemaker implant in 2007 with gen change in 2016 ([facility])     Hypertension-controlled, resume home medication regimen.     Paroxysmal A. Fib -not on any anticoagulation, likely due to frequent falls. On metoprolol XL, continued     Morbid obesity, BMI 81.8  Complicating assessment and treatment, placing patient at high risk for multiple co-morbidities as well as early death and contributing to the patient's presentation. Counseled on weight loss.     RLS-resume Requip     HLD-resume statin    DVT Prophylaxis: Lovenox  Diet: DIET GENERAL;  Code Status: Full Code    PT/OT Eval Status: recommend SNF    Dispo - pending family's decision on discharge dispo.  Likely ok for discharge/    LAVELLE Friedman - CNP

## 2020-10-06 NOTE — PROGRESS NOTES
Physical Therapy    Facility/Department: [facility] - MED SURG  Initial Assessment    NAME: Marge Shook  : 1940  MRN: [id number]    Date of Service: 10/6/2020    Discharge Recommendations:  Subacute/Skilled Nursing Facility   PT Equipment Recommendations  Equipment Needed: No  Other: Defer to facility, pt owns RW and rollator    Assessment   Body structures, Functions, Activity limitations: Decreased functional mobility ; Decreased ADL status; Decreased strength;Decreased safe awareness;Decreased cognition;Decreased endurance;Decreased balance  Assessment: Pt is a 79year old female with PMH including HTN, CAD, PPM/defibrillator, OA, and O.H. who presented to ED with c/o chest pain and multiple falls. Per chart review, EMS is called at least 1x/wk to assist. Upon evaluation, pt presents with impaired strength, balance, endurance and overall functional mobility requiring mod A x 1. Pt would benefit from continued skilled PT while in acute care to address these deficits. Recommend continued PT at SNF at D/C to progress functional mobility and independence. Barriers to home discharge include: no 24/7 supervision/assist, high fall risk given hx of falls, and level of assist currently required. Treatment Diagnosis: Impaired mobility  Prognosis: Good  Decision Making: Medium Complexity  PT Education: Goals;PT Role;Plan of Care;Home Exercise Program;Transfer Training;General Safety;Orientation; Family Education;Gait Training;Disease Specific Education  Patient Education: Pt was educated regarding proper transfer technique and hand placement - will require reinforcement. Barriers to Learning: Cognition, [place]  REQUIRES PT FOLLOW UP: Yes  Activity Tolerance  Activity Tolerance: Patient Tolerated treatment well  Activity Tolerance: No c/o chest pain. Initially dizzy upon supine>sit which resolved with time, /64. C/o SOB at end of session on RA, Spo2 remained 93%.  Instructed to leave pt on room air by physician. Patient Diagnosis(es): The primary encounter diagnosis was Chest pain, unspecified type. A diagnosis of General weakness was also pertinent to this visit. has a past medical history of Arthritis, CAD (coronary artery disease), History of blood transfusion, Hypertension, Orthostatic hypotension, and Pacemaker. has a past surgical history that includes joint replacement; Cardiac surgery; Hysterectomy; Upper gastrointestinal endoscopy; pr egd flex removal lesion(s) by hot biopsy forceps (11/12/2018); and Upper gastrointestinal endoscopy (N/A, 11/12/2018). Restrictions  Restrictions/Precautions  Restrictions/Precautions: General Precautions, Fall Risk, Up as Tolerated  Position Activity Restriction  Other position/activity restrictions: High fall risk, [place], 1.5L O2 via NC on arrival     Vision/Hearing  Vision: Impaired  Vision Exceptions: Wears glasses for reading  Hearing: Exceptions to [facility]  Hearing Exceptions: Bilateral hearing aid(Pt states she does not use them)       Subjective  General  Chart Reviewed: Yes  Patient assessed for rehabilitation services?: Yes  Additional Pertinent Hx: Pacemaker  Response To Previous Treatment: Not applicable  Family / Caregiver Present: Yes(Dtr)  Referring Practitioner: London Nicholson MD  Referral Date : 10/05/20  Diagnosis: Pt to ED 10/5 with c/o chest pain and multiple falls at home. Per chart review, EMS is called at least 1x/wk to assist patient off ground. CT Head, C-Spine and Abdomen (-), CXR: (-). Subjective  Subjective: Pt lying in bed upon arrival, agreeable to PT/OT Evaluation. Dtr present  and assisting with PLOF/home setup information, however did not seem to know how often patient falls. \"Oh yeah those guys come in and ask, 'Where is she this time?'\" - referring to EMS.   Pain Screening  Patient Currently in Pain: Denies  Pain Assessment  Pain Assessment: 0-10  Pain Level: 0  Vital Signs  Pulse: 57  BP: 122/79  BP Location: Right upper arm  Patient Position: Semi fowlers  Patient Currently in Pain: Denies  Oxygen Therapy  SpO2: 97 %  Pulse Oximeter Device Mode: Intermittent  O2 Device: Nasal cannula  O2 Flow Rate (L/min): 1.5 L/min       Orientation  Orientation  Overall Orientation Status: Within Functional Limits     Social/Functional History  Social/Functional History  Lives With: Daughter(Works, no 24/7)  Type of Home: Apartment(1st floor)  Home Layout: One level  Home Access: Level entry  Bathroom Shower/Tub: Walk-in shower, Shower chair with back  Bathroom Toilet: Standard  Bathroom Equipment: Grab bars in shower  Home Equipment: Rolling walker, 4 wheeled walker  ADL Assistance: Needs assistance(Assist to wash back and for upper body dressing)  Homemaking Assistance: Needs assistance(Dtr performs)  Ambulation Assistance: Needs assistance(Prn help, sometimes independent)  Transfer Assistance: Independent  Active : No  Additional Comments: No home O2, sleeps in recliner. Pt initially states she had not had many falls recently, later in session she indicated there had been multiple. Per chart review, EMS is called to house at least 1x/wk. Cognition   Cognition  Overall Cognitive Status: Exceptions  Arousal/Alertness: Delayed responses to stimuli  Following Commands: Follows one step commands with increased time; Follows one step commands with repetition  Attention Span: Attends with cues to redirect  Memory: Decreased recall of recent events  Safety Judgement: Decreased awareness of need for assistance  Problem Solving: Assistance required to generate solutions;Assistance required to identify errors made;Decreased awareness of errors  Insights: Decreased awareness of deficits  Initiation: Requires cues for some  Sequencing: Requires cues for some    Objective     Observation/Palpation  Posture: Fair  Observation: Posterior pelvic tilt, R knee skin tear.   Edema: RLE edema > LLE      RLE AROM: WFL  LLE AROM : WFL    Strength RLE:

## 2020-10-06 NOTE — H&P
Hospital Medicine History & Physical      PCP: Jae Macario MD    Date of Admission: 10/5/2020    Date of Service: Pt seen/examined on 10/5/2020 and Admitted to Inpatient with expected LOS greater than two midnights due to medical therapy. Chief Complaint: Generalized weakness, chest pain      History Of Present Illness:    79 y.o. female who presented to [facility] with above complaints  Pt with PMHx of HTN, HLD, AFib, obesity, sinus node dysfunction PPM presented to the ER today with complaints of weakness and chest pain. EMS was called to the patient's home to help lift the patient, after the patient slid down to the ground from the chair, and unable to get up. Patient usually declines EMS transport to the ED however decided to come in today as she was also having accompanying chest pain. Patient reports epigastric to retrosternal pain dull aching intermittent that started earlier today with no relation to exertion. Patient reports she usually ambulates with a walker or cane. Daughter at home helps with her care. Past Medical History:          Diagnosis Date    Arthritis     CAD (coronary artery disease)     pacemaker/defib    History of blood transfusion     Hypertension     Orthostatic hypotension     Pacemaker        Past Surgical History:          Procedure Laterality Date    CARDIAC SURGERY      pacemaker, defib    HYSTERECTOMY      JOINT REPLACEMENT      right and left    [place] EGD FLEX REMOVAL LESION(S) BY HOT BIOPSY FORCEPS  11/12/2018    ESOPHAGEAL DILATION Kemi Sims #54 performed by Oc Calvo DO at [facility] ENDOSCOPY      erosive gastritis     UPPER GASTROINTESTINAL ENDOSCOPY N/A 11/12/2018    EGD BIOPSY performed by Oc Calvo DO at [facility] ENDOSCOPY       Medications Prior to Admission:      Prior to Admission medications    Medication Sig Start Date End Date Taking?  Authorizing Provider   omeprazole ([address] [place] [address]) 20 MG delayed release capsule TAKE 1 CAPSULE DAILY 7/27/20   Tima Roe MD   gabapentin (NEURONTIN) 300 MG capsule Take 1 capsule by mouth 3 times daily for 5 days. 12/14/19 12/19/19  Alan Duron MD   bisacodyl (BISACODYL) 5 MG EC tablet Take 4 tablets of Bisacodyl for colonoscopy prep as directed. 12/12/19   Magdiel Morgan MD   metoprolol succinate (TOPROL XL) 50 MG extended release tablet Take 1.5 tablets by mouth daily 8/29/19   LAVELLE Jaeger CNP   meclizine (ANTIVERT) 25 MG tablet Take 1 tablet by mouth daily Patient is only taking 1 daily 8/15/19   Dominique Shirley MD   Elastic Bandages & Supports (JOBST KNEE HIGH COMPRESSION SM) MISC 1 each by Does not apply route daily 7/18/19   LAVELLE Jaeger CNP   potassium chloride (MICRO-K) 10 MEQ extended release capsule Take 10 mEq by mouth every other day    Historical Provider, MD   rOPINIRole (REQUIP) 0.5 MG tablet Take 0.5 mg by mouth nightly    Historical Provider, MD   sertraline (ZOLOFT) 100 MG tablet Take 100 mg by mouth daily    Historical Provider, MD   atorvastatin (LIPITOR) 40 MG tablet Take 40 mg by mouth daily    Historical Provider, MD       Allergies:  Patient has no known allergies. Social History:      The patient currently lives at home    TOBACCO:   reports that she has never smoked. She has never used smokeless tobacco.  ETOH:   reports no history of alcohol use. E-Cigarettes Vaping or Juuling     Questions Responses    Vaping Use Never User    Start Date     Does device contain nicotine? Quit Date     Vaping Type             Family History:  Reviewed in detail and negative for DM, CAD, Cancer, CVA. REVIEW OF SYSTEMS:   Pertinent positives as noted in the HPI. All other systems reviewed and negative.     PHYSICAL EXAM PERFORMED:    BP (!) 170/83   Pulse 60   Temp 97.4 °F (36.3 °C) (Oral)   Resp 20   Wt 200 lb (90.7 kg)   SpO2 95%   BMI 41.80 kg/m²     General appearance:  No apparent distress, appears stated age and cooperative. HEENT:  Normal cephalic, atraumatic without obvious deformity. Pupils equal, round, and reactive to light. Extra ocular muscles intact. Conjunctivae/corneas clear. Neck: Supple, with full range of motion. No jugular venous distention. Trachea midline. Respiratory:  Normal respiratory effort. Clear to auscultation, bilaterally without Rales/Wheezes/Rhonchi. Cardiovascular:  Regular rate and rhythm with normal S1/S2 without murmurs, rubs or gallops. Abdomen: Soft, non-tender, non-distended with normal bowel sounds. Musculoskeletal:  No clubbing, cyanosis or edema bilaterally. Full range of motion without deformity. Skin: Skin color, texture, turgor normal.  No rashes or lesions. Neurologic: Generalized weakness, neurovascularly intact without any focal sensory/motor deficits. Cranial nerves: II-XII intact, grossly non-focal.  Psychiatric:  Alert and oriented, thought content appropriate, normal insight  Capillary Refill: Brisk,< 3 seconds   Peripheral Pulses: +2 palpable, equal bilaterally       Labs:     Recent Labs     10/05/20  1525   WBC 12.2*   HGB 14.2   HCT 42.8        Recent Labs     10/05/20  1525      K 4.0      CO2 25   BUN 17   CREATININE 0.8   CALCIUM 9.4     Recent Labs     10/05/20  1525   AST 20   ALT 9*   BILITOT 0.7   ALKPHOS 80     No results for input(s): INR in the last 72 hours. Recent Labs     10/05/20  [address] <0.01       Radiology:   I reviewed the CT head, CT C-spine, CT abdomen pelvis and chest x-ray personally and also reviewed the radiologist reports    EKG:  I have reviewed the EKG with the following interpretation: Electronic atrial pacemaker, rate 60 bpm    CT Head WO Contrast   Final Result   No definite acute intracranial abnormality. CT Cervical Spine WO Contrast   Final Result   No acute abnormality of the cervical spine.          CT ABDOMEN PELVIS W IV CONTRAST Additional Contrast? None Final Result   Negative CT examination. No evidence of acute process in the abdomen or   pelvis. XR CHEST PORTABLE   Final Result   No acute finding or significant change. ASSESSMENT:PLAN:    Chest pain, atypical.  Initial EKG and troponin negative. Less likely cardiac. Follow serial cardiac enzymes. Patient currently chest pain-free. LHC in 2015 showed nonobstructive CAD. Generalized weakness/deconditioning -PT OT consult, daughter unable to take care of patient at home. May need placement in subacute rehab. Fall precautions ordered    Sinus node dysfunction: stable              -s/p dual pacemaker implant in 2007 with gen change in 2016 ([facility])    Hypertension-controlled, resume home medication regimen    Paroxysmal A. Fib -not on any anticoagulation. On metoprolol XL, continued    Morbid obesity, BMI 33.3  Complicating assessment and treatment, placing patient at high risk for multiple co-morbidities as well as early death and contributing to the patient's presentation. Counseled on weight loss. RLS-resume Requip    HLD-resume statin      DVT Prophylaxis: Lovenox  Diet: No diet orders on file  Code Status: Full code  PT/OT Eval Status: consulted    Vena Alcaraz stay       Luciano Archer MD    Thank you Kellie Awan MD for the opportunity to be involved in this patient's care. If you have any questions or concerns please feel free to contact me at [phone].

## 2020-10-06 NOTE — ED PROVIDER NOTES
[address]        Pt Name: Stanton Liang  MRN: [id number]  [employer] 1940  Date of evaluation: 10/5/2020  Provider: DELTA Smith  PCP: Kat Walker MD     I have seen and evaluated this patient with my supervising physician Dr. Elli Betts. CHIEF COMPLAINT       Chief Complaint   Patient presents with    Chest Pain     patient states \"my chest has been hurting all day\".  Fall     per EMS call was made for lifting help, patient slid out of chair and sat on floor for about 45 minutes until daughter got home. EMS states \"this is pretty typical for her, we always go over there to get her up off the floor\". Patient was incontinent of urine while on floor. HISTORY OF PRESENT ILLNESS   (Location, Timing/Onset, Context/Setting, Quality, Duration, Modifying Factors, Severity, Associated Signs and Symptoms)  Note limiting factors. Stanton Liang is a 79 y.o. female who presents the emergency department via EMS for chest pain. EMS was called to the home to help lift the patient, patient states that she did try to stand from her chair and slowly slid to the ground and was unable to get back into the chair. EMS reports that they have been called multiple times to the home to help this patient into her chair, at least once per week and usually more. Patient usually declines transport, today states she was having chest pain and was agreeable with transport. Patient points to her upper abdomen when describing her chest pain, but does not point or indicate that she has pain of her chest.  Associated nausea without vomiting. Denies numbness, weakness, headache, vision change, slurred speech, dysuria, urinary frequency, neck pain, back pain. Nursing Notes were all reviewed and agreed with or any disagreements were addressed in the HPI.     REVIEW OF SYSTEMS    (2-9 systems for level 4, 10 or more for level 5)     Review of Systems Refills: 0    Associated Diagnoses: Nerve pain      bisacodyl (BISACODYL) 5 MG EC tablet Take 4 tablets of Bisacodyl for colonoscopy prep as directed. Qty: 4 tablet, Refills: 0    Associated Diagnoses: Rectal bleeding      metoprolol succinate (TOPROL XL) 50 MG extended release tablet Take 1.5 tablets by mouth daily  Qty: 135 tablet, Refills: 3    Associated Diagnoses: Essential hypertension      meclizine (ANTIVERT) 25 MG tablet Take 1 tablet by mouth daily Patient is only taking 1 daily  Qty: 90 tablet, Refills: 3    Associated Diagnoses: Light headedness      Elastic Bandages & Supports (JOBST KNEE HIGH COMPRESSION SM) MISC 1 each by Does not apply route daily  Qty: 1 each, Refills: 2      potassium chloride (MICRO-K) 10 MEQ extended release capsule Take 10 mEq by mouth every other day      rOPINIRole (REQUIP) 0.5 MG tablet Take 0.5 mg by mouth nightly      sertraline (ZOLOFT) 100 MG tablet Take 100 mg by mouth daily      atorvastatin (LIPITOR) 40 MG tablet Take 40 mg by mouth daily               ALLERGIES     Patient has no known allergies. FAMILYHISTORY     History reviewed. No pertinent family history. SOCIAL HISTORY       Social History     Tobacco Use    Smoking status: Never Smoker    Smokeless tobacco: Never Used   Substance Use Topics    Alcohol use: No    Drug use: No       SCREENINGS    [place] Coma Scale  Eye Opening: Spontaneous  Best Verbal Response: Oriented  Best Motor Response: Obeys commands  [place] Coma Scale Score: 15        PHYSICAL EXAM    (up to 7 for level 4, 8 or more for level 5)     ED Triage Vitals   BP Temp Temp Source Pulse Resp SpO2 Height Weight   10/05/20 1523 10/05/20 1523 10/05/20 1523 10/05/20 1523 10/05/20 1509 10/05/20 1523 10/05/20 2103 10/05/20 1509   (!) 188/75 97.4 °F (36.3 °C) Oral 60 17 95 % 4' 10\" (1.473 m) 200 lb (90.7 kg)       Physical Exam  Vitals signs reviewed. Constitutional:       Appearance: She is not diaphoretic.    HENT:      Nose: No congestion or rhinorrhea. Eyes:      General: No scleral icterus. Conjunctiva/sclera: Conjunctivae normal.   Neck:      Musculoskeletal: Normal range of motion and neck supple. Cardiovascular:      Rate and Rhythm: Normal rate and regular rhythm. Pulses: Normal pulses. Heart sounds: Normal heart sounds. No murmur. No friction rub. No gallop. Pulmonary:      Effort: Pulmonary effort is normal. No respiratory distress. Breath sounds: Normal breath sounds. No stridor. No wheezing, rhonchi or rales. Abdominal:      General: There is no distension. Palpations: Abdomen is soft. Tenderness: There is no abdominal tenderness. There is no right CVA tenderness, left CVA tenderness, guarding or rebound. Musculoskeletal: Normal range of motion. Skin:     General: Skin is warm and dry. Capillary Refill: Capillary refill takes less than 2 seconds. Neurological:      General: No focal deficit present. Mental Status: She is alert and oriented to person, place, and time. Cranial Nerves: No cranial nerve deficit. Sensory: No sensory deficit. Motor: No weakness. Comments: Unable to ambulate patient due to stated weakness.    Psychiatric:         Mood and Affect: Mood normal.         Behavior: Behavior normal.         DIAGNOSTIC RESULTS   LABS:    Labs Reviewed   CBC WITH AUTO DIFFERENTIAL - Abnormal; Notable for the following components:       Result Value    WBC 12.2 (*)     Neutrophils Absolute 11.2 (*)     Lymphocytes Absolute 0.4 (*)     All other components within normal limits    Narrative:     Performed at:  [facility]) [address], [address] [place] [address]   Phone [phone]   COMPREHENSIVE METABOLIC PANEL - Abnormal; Notable for the following components:    Glucose 145 (*)     ALT 9 (*)     All other components within normal limits    Narrative:     Performed at:  [facility]  [address], [place] [employer]   Phone [phone]   CK - Abnormal; Notable for the following components: Total  (*)     All other components within normal limits    Narrative:     Performed at:  [facility]  [address],  [place], [place]   Phone [phone]   TROPONIN    Narrative:     Performed at:  [facility]  [address],  [place], [place]   Phone [phone]   TROPONIN    Narrative:     Performed at:  [facility]  [address],  [place], [place] [employer] [place]   Phone [phone]   TROPONIN       All other labs were within normal range or not returned as of this dictation. EKG: All EKG's are interpreted by the Emergency Department Physician in the absence of a cardiologist.  Please see their note for interpretation of EKG. RADIOLOGY:   Non-plain film images such as CT, Ultrasound and MRI are read by the radiologist. Plain radiographic images are visualized and preliminarily interpreted by the ED Provider with the below findings:        Interpretation per the Radiologist below, if available at the time of this note:    CT Head WO Contrast   Final Result   No definite acute intracranial abnormality. CT Cervical Spine WO Contrast   Final Result   No acute abnormality of the cervical spine. CT ABDOMEN PELVIS W IV CONTRAST Additional Contrast? None   Final Result   Negative CT examination. No evidence of acute process in the abdomen or   pelvis. XR CHEST PORTABLE   Final Result   No acute finding or significant change.            Ct Head Wo Contrast    Result Date: 10/5/2020  EXAMINATION: CT OF THE HEAD WITHOUT CONTRAST  10/5/2020 6:21 pm TECHNIQUE: CT of the head was performed without the administration of intravenous contrast. Dose modulation, iterative reconstruction, and/or weight based adjustment of the mA/kV was utilized to reduce the radiation dose to prevertebral soft tissue swelling. Vascular calcification noted. No apical pneumothorax. No acute abnormality of the cervical spine. Ct Abdomen Pelvis W Iv Contrast Additional Contrast? None    Result Date: 10/5/2020  EXAMINATION: CT OF THE ABDOMEN AND PELVIS WITH CONTRAST 10/5/2020 4:25 pm TECHNIQUE: CT of the abdomen and pelvis was performed with the administration of intravenous contrast. Multiplanar reformatted images are provided for review. Dose modulation, iterative reconstruction, and/or weight based adjustment of the mA/kV was utilized to reduce the radiation dose to as low as reasonably achievable. COMPARISON: None. HISTORY: ORDERING SYSTEM PROVIDED HISTORY: Abdominal pain, vomiting TECHNOLOGIST PROVIDED HISTORY: Reason for exam:->Abdominal pain, vomiting Additional Contrast?->None Reason for Exam: Abdominal pain, vomiting Acuity: Acute Type of Exam: Initial Relevant Medical/Surgical History: hyst FINDINGS: Lower Chest: No evidence of pneumonia or other acute findings. Mild bibasilar atelectasis is present. Organs: No acute abnormality of the organs of the abdomen. No evidence of pancreatitis. No ureteral stone or hydronephrosis. No evidence of pyelonephritis. Cholecystectomy has been performed. GI/Bowel: No bowel obstruction or other acute process demonstrated. No evidence of colitis, diverticulitis or appendicitis. There is sigmoid diverticulosis. Pelvis: No acute abnormality of the pelvis. No evidence of cystitis. Peritoneum/Retroperitoneum: No free air, ascites or abscess. Bones/Soft Tissues: No fracture or other acute osseous process. There is a remote appearing compression fracture of T8. Negative CT examination. No evidence of acute process in the abdomen or pelvis.      Xr Chest Portable    Result Date: 10/5/2020  EXAMINATION: ONE XRAY VIEW OF THE CHEST 10/5/2020 3:36 pm COMPARISON: 12/12/2019 radiograph HISTORY: ORDERING SYSTEM PROVIDED HISTORY: chest pain TECHNOLOGIST PROVIDED HISTORY: Reason for exam:->chest pain Reason for Exam: chest pain Acuity: Acute Type of Exam: Initial FINDINGS: Borderline cardiomegaly. Pacemaker stable in the left chest.  Mediastinum and pulmonary vascular markings appear normal.  Low lung volume, but no acute airspace abnormality. No significant skeletal finding. No acute finding or significant change.            PROCEDURES   Unless otherwise noted below, none     Procedures    CRITICAL CARE TIME   N/A    CONSULTS:  IP CONSULT TO HOSPITALIST      EMERGENCY DEPARTMENT COURSE and DIFFERENTIAL DIAGNOSIS/MDM:   Vitals:    Vitals:    10/05/20 1738 10/05/20 1855 10/05/20 2103 10/05/20 2325   BP: (!) 174/68 (!) 170/83 (!) 173/67 (!) 132/56   Pulse: 60 60 61 62   Resp: 19 20 21 20   Temp:   98 °F (36.7 °C) 98.4 °F (36.9 °C)   TempSrc:   Oral Oral   SpO2: 94% 95% 96% 97%   Weight:   200 lb (90.7 kg)    Height:   4' 10\" (1.473 m)        Patient was given the following medications:  Medications   fentaNYL (SUBLIMAZE) injection 50 mcg (50 mcg Intravenous Given 10/5/20 1616)   atorvastatin (LIPITOR) tablet 40 mg (has no administration in time range)   gabapentin (NEURONTIN) capsule 300 mg ( Oral Automatically Held 10/11/20 2100)   meclizine (ANTIVERT) tablet 25 mg (has no administration in time range)   metoprolol succinate (TOPROL XL) extended release tablet 75 mg (has no administration in time range)   pantoprazole (PROTONIX) tablet 20 mg (has no administration in time range)   rOPINIRole (REQUIP) tablet 0.5 mg ( Oral Automatically Held 10/11/20 2100)   sertraline (ZOLOFT) tablet 100 mg (has no administration in time range)   sodium chloride flush 0.9 % injection 10 mL (10 mLs Intravenous Given 10/5/20 2143)   sodium chloride flush 0.9 % injection 10 mL (has no administration in time range)   acetaminophen (TYLENOL) tablet 650 mg (650 mg Oral Given 10/5/20 2159)     Or   acetaminophen (TYLENOL) suppository 650 mg ( Rectal See Alternative 10/5/20 2159)   polyethylene

## 2020-10-06 NOTE — CARE COORDINATION
Writer met with pt and dtr Hernandez Salas at bedside, pt states she wants to go home and not to a rehab facility. Pt states she lives at home with Vanessa Grief agrees with this and with pt returning home. Pt/dtr agreeable to home care, have no agency preference. Writer will make home care referrals. JALEN Langston     6253 Addendum:  [facility] does not take pt's insurance. Writer left message for [facility]. JALEN Langston     0555 Addendum:  Writer spoke with [facility], they can service pt for SN, PT, and OT.   Writer will notify them when pt is discharged, [facility] has Epic access and will be able to get orders/AVS.  JALEN Langston

## 2020-10-06 NOTE — PLAN OF CARE
Problem: Falls - Risk of:  Goal: Will remain free from falls  Description: Will remain free from falls  Outcome: Ongoing  Note: Pt high fall risk. Instructed to use call light before getting out of bed or if in need of assistance. Call light within reach. Bed in low position. Bed alarm and nonskid footwear on. Will continue to monitor. Problem: Skin Integrity:  Goal: Will show no infection signs and symptoms  Description: Will show no infection signs and symptoms  Outcome: Ongoing  Note: Pt is at risk for skin breakdown. Pt will have skin assessments every shift, Q2 turns, heels elevated off of the bed, and friction and shear prevented when possible. Will continue to monitor for signs of skin breakdown and enforce prevention measures.

## 2020-10-06 NOTE — PROGRESS NOTES
Patient admitted to room 367 from ED. Patient oriented to room, call light, bed rails, phone, lights, & bathroom. Patient instructed about the schedule of the day including: VS frequency, lab draws, possible tests, frequency of MD & staff rounds. Pt instructed about prescribed diet, how/when to call for meal service & television. Telemetry box in place, patient aware of placement & reason. Bed locked, in lowest position, side rails up 2/4, call light within reach. VS  \4 Eyes Skin Assessment     NAME:  Anna Tolbert OF BIRTH:  1940  MEDICAL RECORD NUMBER:  [id number]    The patient is being assess for  Admission    I agree that 2 RN's have performed a thorough Head to Toe Skin Assessment on the patient. ALL assessment sites listed below have been assessed. Areas assessed by both nurses:    Head, Face, Ears, Shoulders, Back, Chest, Arms, Elbows, Hands, Sacrum. Buttock, Coccyx, Ischium and Legs. Feet and Heels        Does the Patient have a Wound?  See epic charting       Braulio Prevention initiated:  Yes   Wound Care Orders initiated:  No    Pressure Injury (Stage 3,4, Unstageable, DTI, NWPT, and Complex wounds) if present place consult order under [de-identified] No    New and Established Ostomies if present place consult order under : No      Nurse 1 eSignature: Electronically signed by Misty العراقي RN on 10/5/20 at 8:34 PM EDT    **SHARE this note so that the co-signing nurse is able to place an eSignature**    Nurse 2 eSignature: Electronically signed by Lauro Johnston RN on 10/5/20 at 10:06 PM EDT

## 2020-10-07 NOTE — PLAN OF CARE
Problem: Pain:  Goal: Pain level will decrease  Description: Pain level will decrease  Outcome: Ongoing   Patient alert and oriented, rates pain appropriately using 0-10 pain scale. Patient calls out as needed for pain intervention, will continue to monitor and administer intervention as ordered and requested. Patient rates pain 8/10 in back and head, prn tylenol and fentanyl administered.

## 2020-10-07 NOTE — CARE COORDINATION
Writer spoke with Halima/pt's primary RN, she has not ambulated with pt again but will. Writer explained pt can discharge anytime tonight if ok with pt/family, DCP can send home care orders/AVS to [address] in the morning after discharge, or will f/u tomorrow if pt still here.   JESSICA Marie-LOUISA

## 2020-10-07 NOTE — PROGRESS NOTES
Physical Therapy Attempt Note    Chart reviewed for the patient and attempted to see the pt on 10/7/20 at 845. Unable to see the pt at this time d/t pt asleep. Pt given verbal and tactile stimulation to wake up, but pt does not open her eyes. Pt appears to be aroused, but not willing to participate. Of note, pt has declined therapy recommendation for SNF and plans to return home. Will re-attempt as pt is available and as schedule allows.     Flynn Calderon, PT, DPT

## 2020-10-07 NOTE — DISCHARGE INSTR - COC
Continuity of Care Form    Patient Name: Keily Valenzuela   :  1940  MRN:  [id number]    Admit date:  10/5/2020  Discharge date:  10/7/20    Code Status Order: Full Code   Advance Directives:      Admitting Physician:  Jose Maloney MD  PCP: Alida Arroyo MD    Discharging Nurse: [facility] Unit/Room#: 7010/3599-45  Discharging Unit Phone Number: [phone]    Emergency Contact:   Extended Emergency Contact Information  Primary Emergency Contact: Asaf Vieyraer [address] Phone: [phone]  Mobile Phone: [phone]  Relation: Child    Past Surgical History:  Past Surgical History:   Procedure Laterality Date    CARDIAC SURGERY      pacemaker, defib    HYSTERECTOMY      JOINT REPLACEMENT      right and left    GA EGD FLEX REMOVAL LESION(S) BY HOT BIOPSY FORCEPS  2018    ESOPHAGEAL DILATION Lena Sheldon #54 performed by Leonila Erickson DO at [facility] ENDOSCOPY      erosive gastritis     UPPER GASTROINTESTINAL ENDOSCOPY N/A 2018    EGD BIOPSY performed by Leonila Erickson DO at [address] [place] [address] Real       Immunization History:   Immunization History   Administered Date(s) Administered    Influenza, High Dose (Fluzone 65 yrs and older) 2017, 10/12/2018    Influenza, Quadv, IM, PF (6 mo and older Fluzone, Flulaval, Fluarix, and 3 yrs and older Afluria) 11/10/2014, 2015, 2016    Pneumococcal Conjugate 13-valent (Zssumnu10) 2017    Pneumococcal Polysaccharide ([id number]) 2015    Tdap (Boostrix, Adacel) 2017       Active Problems:  Patient Active Problem List   Diagnosis Code    Orthostatic hypotension I95.1    Cardiac pacemaker in situ Z95.0    Sinus node dysfunction (HCC) I49.5    Essential hypertension I10    Restless legs G25.81    Gastroesophageal reflux disease without esophagitis K21.9    Mild episode of recurrent major depressive disorder ([facility] F33.0    Nerve pain M79.2    Paroxysmal atrial fibrillation (HCC) I48.0    Rectal bleeding K62.5    Shortness of breath R06.02    Weakness generalized R53.1    CAD (coronary artery disease) I25.10    Chest pain R07.9    Generalized weakness R53.1       Isolation/Infection:   Isolation            No Isolation          Patient Infection Status       Infection Onset Added Last Indicated Last Indicated By Review Planned Expiration Resolved Resolved By    None active    Resolved    C-diff Rule Out  12/13/19 12/14/19 GI Bacterial Pathogens By PCR (Ordered)   12/15/19 Rule-Out Test Resulted            Nurse Assessment:  Last Vital Signs: BP (!) 138/104   Pulse 61   Temp 98.4 °F (36.9 °C) (Oral)   Resp 18   Ht 4' 10\" (1.473 m)   Wt 200 lb (90.7 kg)   SpO2 90%   BMI 41.80 kg/m²     Last documented pain score (0-10 scale): Pain Level: 3  Last Weight:   Wt Readings from Last 1 Encounters:   10/05/20 200 lb (90.7 kg)     Mental Status:  oriented, alert, coherent, logical, thought processes intact and able to concentrate and follow conversation    IV Access:  - None    Nursing Mobility/ADLs:  Walking   Assisted  Transfer  Assisted  Bathing  Assisted  Dressing  Assisted  Toileting  Assisted  Feeding  Independent  Med Admin  Assisted  Med Delivery   whole    Wound Care Documentation and Therapy:        Elimination:  Continence:   · Bowel: Yes  · Bladder: No  Urinary Catheter: None   Colostomy/Ileostomy/Ileal Conduit: No       Date of Last BM: 10/8/20    Intake/Output Summary (Last 24 hours) at 10/7/2020 1108  Last data filed at 10/7/2020 0505  Gross per 24 hour   Intake 480 ml   Output 575 ml   Net -95 ml     I/O last 3 completed shifts:   In: 480 [P.O.:480]  Out: 575 [Urine:575]    Safety Concerns:     History of Falls (last 30 days) and At Risk for Falls    Impairments/Disabilities:      Vision and Hearing    Nutrition Therapy:  Current Nutrition Therapy:   - Oral Diet:  General    Routes of Feeding: Oral  Liquids: No Restrictions  Daily Fluid Restriction: no  Last Modified Barium Swallow with Video (Video Swallowing Test): not done    Treatments at the Time of Hospital Discharge:   Respiratory Treatments: None  Oxygen Therapy:  is not on home oxygen therapy. Ventilator:    - No ventilator support    Rehab Therapies: Physical Therapy and Occupational Therapy  Weight Bearing Status/Restrictions: No weight bearing restirctions  Other Medical Equipment (for information only, NOT a DME order):  walker  Other Treatments: None    Patient's personal belongings (please select all that are sent with patient):  None    RN SIGNATURE:  Electronically signed by Regan Bowden on 10/8/20 at 12:08 PM EDT    CASE MANAGEMENT/SOCIAL WORK SECTION    Inpatient Status Date: 10/5    Readmission Risk Assessment Score:  Readmission Risk              Risk of Unplanned Readmission:        9           Discharging to Facility/ Agency   · Name: [address]  · Address:  · Phone: [phone]  · Fax:    / signature: Electronically signed by Derek Vega RN on 10/7/20 at 11:20 AM EDT    PHYSICIAN SECTION    Prognosis: Good    Condition at Discharge: Stable    Recommended Labs or Other Treatments After Discharge: Home PT/OT    Physician Certification: I certify the above information and transfer of Marcelo Escalante  is necessary for the continuing treatment of the diagnosis listed and that she requires Home Care for greater 30 days.      Update Admission H&P: No change in H&P    PHYSICIAN SIGNATURE:  Electronically signed by Daryle Forester, APRN - CNP on 10/8/2020 at 10:40 AM

## 2020-10-07 NOTE — PROGRESS NOTES
Taken to bathroom with a walker, she was able to ambulate but got dizzy afterwards. Assist X2 with a walker to ambulate to bed.  Son at bedside

## 2020-10-07 NOTE — CARE COORDINATION
Pt has discharge order. Writer spoke with Wilbur Cowan NP, pt states she almost passed out when she got up earlier. Alicia MONROY told primary RN to see how pt does next time ambulaing, hopeful to still discharge today. Pt still refusing SNF, agreed to home care, [address] can service at discharge.   Leah Pierre, JESSICA-RN

## 2020-10-07 NOTE — PLAN OF CARE
Problem: Falls - Risk of:  Goal: Will remain free from falls  Description: Will remain free from falls  Outcome: Ongoing  Goal: Absence of physical injury  Description: Absence of physical injury  Outcome: Ongoing     Problem: Pain:  Goal: Pain level will decrease  Description: Pain level will decrease  10/7/2020 1311 by Dee Zaman RN  Outcome: Ongoing  10/7/2020 0012 by Umair Segundo RN  Outcome: Ongoing  Goal: Control of acute pain  Description: Control of acute pain  Outcome: Ongoing  Goal: Control of chronic pain  Description: Control of chronic pain  Outcome: Ongoing     Problem: Skin Integrity:  Goal: Will show no infection signs and symptoms  Description: Will show no infection signs and symptoms  Outcome: Ongoing  Goal: Absence of new skin breakdown  Description: Absence of new skin breakdown  Outcome: Ongoing

## 2020-10-07 NOTE — PROGRESS NOTES
Pt and daughter discussed discharge today, they felt that she is still dizzy and unable to care for herself in her apartment tonight. Pt declined walking to bathroom. Daughter would like her to stay tonight so that she can be better, less dizziness with ambulation.  Provider Chato Yap MD)

## 2020-10-08 NOTE — PROGRESS NOTES
Physical Therapy  Facility/Department: [facility] B3 - MED SURG  Daily Treatment Note  NAME: Ankita Espinal  : 1940  MRN: [id number]    Date of Service: 10/8/2020    Discharge Recommendations:  [address], Home with Home health PT   PT Equipment Recommendations  Equipment Needed: No  If pt is unable to be seen after this session, please let this note serve as discharge summary. Please see case management note for discharge disposition. Thank you. Assessment   Body structures, Functions, Activity limitations: Decreased functional mobility ; Decreased ADL status; Decreased strength;Decreased safe awareness;Decreased cognition;Decreased endurance;Decreased balance  Assessment: Pt continues to participate in B LE exercises but is not progressing wtih mobility due to weakness/fatigue. Pt's daughter present and stated pt did more activity earlier today and maybe more fatigued. Pt positioned in chair at EOS. Recommend SNF upon DC to improve safe mobility however pt/family wish to go home. REcommend frontloading HHPT if pt does go home  Treatment Diagnosis: Impaired mobility  Prognosis: Good  Decision Making: Medium Complexity  PT Education: Goals;PT Role;Plan of Care;Home Exercise Program;Transfer Training;General Safety;Orientation; Family Education;Gait Training;Disease Specific Education  Patient Education: Pt was educated regarding proper transfer technique and hand placement - will require reinforcement. Barriers to Learning: Cognition, [place]  REQUIRES PT FOLLOW UP: Yes  Activity Tolerance  Activity Tolerance: Patient Tolerated treatment well     Patient Diagnosis(es): The primary encounter diagnosis was Chest pain, unspecified type. A diagnosis of General weakness was also pertinent to this visit. has a past medical history of Arthritis, CAD (coronary artery disease), History of blood transfusion, Hypertension, Orthostatic hypotension, and Pacemaker.    has a past surgical history that includes joint replacement; Cardiac surgery; Hysterectomy; Upper gastrointestinal endoscopy; pr egd flex removal lesion(s) by hot biopsy forceps (11/12/2018); and Upper gastrointestinal endoscopy (N/A, 11/12/2018). Restrictions  Restrictions/Precautions  Restrictions/Precautions: General Precautions, Fall Risk, Up as Tolerated  Position Activity Restriction  Other position/activity restrictions: High fall risk, Purewick  Subjective   General  Chart Reviewed: Yes  Additional Pertinent Hx: Pacemaker  Response To Previous Treatment: Patient with no complaints from previous session. Family / Caregiver Present: Yes  Referring Practitioner: Jaime Eaton MD  Subjective  Subjective: Pt resting in bed. Reports B knee pain from fall and generalized pain in back, neck. Denies need for intervention  General Comment  Comments: cleared by nursing          Orientation     Cognition      Objective   Bed mobility  Supine to Sit: Minimal assistance(elevated HOB)  Sit to Supine: Unable to assess  Transfers  Sit to Stand: Minimal Assistance  Stand to sit: Minimal Assistance  Ambulation  Ambulation?: Yes  WB Status: No restrictions  Ambulation 1  Surface: level tile  Device: Rolling Walker  Assistance: Moderate assistance  Quality of Gait: Decreased step length and height, decreased fabi, increased time to perform, assist with RW management.   Distance: 3ft bed>chair     Balance  Posture: Fair  Sitting - Static: Good  Sitting - Dynamic: Fair  Standing - Static: Fair  Standing - Dynamic: Fair;-  Exercises  Quad Sets: x10 B  Heelslides: x10 B  Knee Long Arc Quad: x10 B  Knee Short Arc Quad: x10 B  Ankle Pumps: x20 B   AROM RLE (degrees)  RLE AROM: WFL  AROM LLE (degrees)  LLE AROM : [facility]        AM-PAC Score  AM-PAC Inpatient Mobility Raw Score : 14 (10/08/20 1238)  AM-PAC Inpatient T-Scale Score : 38.1 (10/08/20 1238)  Mobility Inpatient CMS 0-100% Score: 61.29 (10/08/20 1238)  Mobility Inpatient CMS G-Code Modifier : CL (10/08/20 1238)          Goals  Short term goals  Time Frame for Short term goals: 1 week 10/13  Short term goal 1: Pt will perform supine<>sit transfer with supervision. 10/8: Mod A  Short term goal 2: Pt will perform sit<>stand transfer with SBA. 10/8: min-mod A  Short term goal 3: Pt will ambulate 20ft with RW and CGA. 10/8: 3' from bed to chair  Short term goal 4: By 10/09 pt will tolerate x10-15 reps BLE exercise to assist with functional transfers and ambulation. 10/8: MET, goal ongoing  Patient Goals   Patient goals : \"To go to rehab. \"    Plan    Plan  Times per week: 3-5x/wk  Times per day: Daily  Current Treatment Recommendations: Strengthening, Balance Training, Functional Mobility Training, Transfer Training, Gait Training, Endurance Training, Pain Management, Patient/Caregiver Education & Training, Safety Education & Training, Home Exercise Program  Safety Devices  Type of devices: Call light within reach, Chair alarm in place, Gait belt, Left in chair, Nurse notified, All fall risk precautions in place  Restraints  Initially in place: No     Therapy Time   Individual Concurrent Group Co-treatment   Time In 1115         Time Out 1138         Minutes Chris Spencer [address] [place] [address]

## 2020-10-08 NOTE — PROGRESS NOTES
Pt d/c'd home. Removed peripheral IV and stopped bleeding. Catheter intact. Pt tolerated well. No redness noted at site. Notified [facility] and removed tele box. Reviewed d/c instructions, home meds, and  f/u information utilizing teach-back method. Patient and daughter verbalized understanding. Prescriptions picked up from outpatient pharmacy. Pt and family escorted off of unit with via wheelchair by PCA with discharge paperwork and all belongings in hand.

## 2020-10-08 NOTE — PROGRESS NOTES
Hospitalist Progress Note      PCP: Sofia Pablo MD    Date of Admission: 10/5/2020    Chief Complaint: Generalized weakness, chest pain    Hospital Course: 79 y.o. female who presented to [facility] with above complaints  Pt with PMHx of HTN, HLD, AFib, obesity, sinus node dysfunction PPM presented to the ER today with complaints of weakness and chest pain. EMS was called to the patient's home to help lift the patient, after the patient slid down to the ground from the chair, and unable to get up. Patient usually declines EMS transport to the ED however decided to come in today as she was also having accompanying chest pain. Patient reports epigastric to retrosternal pain dull aching intermittent that started earlier today with no relation to exertion. Patient reports she usually ambulates with a walker or cane. Daughter at home helps with her care.     Subjective: Dizzy when getting up to bathroom today. Bradycardia noted - will decrease Toprol.       Medications:  Reviewed    Infusion Medications   Scheduled Medications    metoprolol succinate  25 mg Oral Daily    atorvastatin  40 mg Oral Nightly    gabapentin  300 mg Oral TID    meclizine  25 mg Oral Daily    pantoprazole  20 mg Oral QAM AC    rOPINIRole  0.5 mg Oral Nightly    sertraline  100 mg Oral Daily    sodium chloride flush  10 mL Intravenous 2 times per day    enoxaparin  40 mg Subcutaneous Daily     PRN Meds: sodium chloride flush, acetaminophen **OR** acetaminophen, polyethylene glycol, promethazine **OR** ondansetron, nitroGLYCERIN      Intake/Output Summary (Last 24 hours) at 10/8/2020 0905  Last data filed at 10/8/2020 0320  Gross per 24 hour   Intake 350 ml   Output 500 ml   Net -150 ml       Physical Exam Performed:    BP (!) 133/26   Pulse 61   Temp 97.6 °F (36.4 °C) (Oral)   Resp 18   Ht 4' 10\" (1.473 m)   Wt 200 lb (90.7 kg)   SpO2 91%   BMI 41.80 kg/m²     General appearance: No apparent distress, appears stated age and cooperative. HEENT: Pupils equal, round, and reactive to light. Conjunctivae/corneas clear. Neck: Supple, with full range of motion. No jugular venous distention. Trachea midline. Respiratory:  Normal respiratory effort. Clear to auscultation, bilaterally without Rales/Wheezes/Rhonchi. Cardiovascular: Regular rate and rhythm with normal S1/S2 without murmurs, rubs or gallops. Abdomen: Soft, non-tender, non-distended with normal bowel sounds. Musculoskeletal: No clubbing, cyanosis or edema bilaterally. Full range of motion without deformity. Skin: Skin color, texture, turgor normal.  No rashes or lesions. Neurologic:  Neurovascularly intact without any focal sensory/motor deficits. Cranial nerves: II-XII intact, grossly non-focal.  Psychiatric: Alert and oriented, thought content appropriate, normal insight  Capillary Refill: Brisk,< 3 seconds   Peripheral Pulses: +2 palpable, equal bilaterally       Labs:   Recent Labs     10/05/20  1525   WBC 12.2*   HGB 14.2   HCT 42.8        Recent Labs     10/05/20  1525      K 4.0      CO2 25   BUN 17   CREATININE 0.8   CALCIUM 9.4     Recent Labs     10/05/20  1525   AST 20   ALT 9*   BILITOT 0.7   ALKPHOS 80     No results for input(s): INR in the last 72 hours. Recent Labs     10/05/20  1525 10/05/20  2128 10/06/20  0058   CKTOTAL 440*  --   --    TROPONINI <0.01 <0.01 <0.01       Urinalysis:      Lab Results   Component Value Date    NITRU POSITIVE 12/12/2019    WBCUA 10-20 12/12/2019    BACTERIA 3+ 12/12/2019    RBCUA 0-2 12/12/2019    BLOODU Negative 12/12/2019    SPECGRAV 1.020 12/12/2019    GLUCOSEU Negative 12/12/2019       Radiology:  CT Head WO Contrast   Final Result   No definite acute intracranial abnormality. CT Cervical Spine WO Contrast   Final Result   No acute abnormality of the cervical spine. CT ABDOMEN PELVIS W IV CONTRAST Additional Contrast? None   Final Result   Negative CT examination.  No evidence of acute process in the abdomen or   pelvis. XR CHEST PORTABLE   Final Result   No acute finding or significant change. Assessment/Plan:    Active Hospital Problems    Diagnosis    Chest pain [R07.9]    Generalized weakness [R53.1]    CAD (coronary artery disease) [I25.10]    Weakness generalized [R53.1]    Paroxysmal atrial fibrillation (HCC) [I48.0]    Essential hypertension [I10]    Cardiac pacemaker in situ [Z95.0]     Chest pain, atypical.  Initial EKG and troponin negative. Less likely cardiac. Serial cardiac enzymes are negative. Patient currently chest pain-free. LHC in 2015 showed nonobstructive CAD.      Generalized weakness/deconditioning -PT OT consult, daughter unable to take care of patient at home. May need placement in subacute rehab. Fall precautions ordered     Sinus node dysfunction: stable  -s/p dual pacemaker implant in 2007 with gen change in 2016 ([place])     Hypertension-controlled, resume home medication regimen.     Paroxysmal A. Fib -not on any anticoagulation, likely due to frequent falls. On metoprolol XL, continued     Morbid obesity, BMI 84.3  Complicating assessment and treatment, placing patient at high risk for multiple co-morbidities as well as early death and contributing to the patient's presentation. Counseled on weight loss.     RLS-resume Requip     HLD-resume statin    DVT Prophylaxis: Lovenox  Diet: DIET GENERAL;  Code Status: Full Code    PT/OT Eval Status: recommend SNF    Dispo - pending family's decision on discharge dispo.  Likely ok for discharge later today if dizziness improved    Herron Forward, APRN - CNP

## 2020-10-08 NOTE — PLAN OF CARE
Problem: Falls - Risk of:  Goal: Will remain free from falls  Description: Will remain free from falls  10/8/2020 1113 by [facility] [employer]  Outcome: Ongoing  Note: Pt high fall risk. Instructed to use call light before getting out of bed or when needing assistance. Call light within reach. Bed in low position. Bed alarm and nonskid footwear on. Will continue to monitor. Problem: Skin Integrity:  Goal: Absence of new skin breakdown  Description: Absence of new skin breakdown  10/8/2020 1113 by [facility] [employer]  Outcome: Ongoing  Note: Pt is at risk for skin breakdown. Pt will have skin assessments every shift, Q2 turns, heels elevated off of the bed, and friction and shear prevented when possible. Will continue to monitor for signs of skin breakdown and enforce prevention measures. Problem: Pain:  Goal: Control of chronic pain  Description: Control of chronic pain  10/8/2020 1113 by [facility] [employer]  Outcome: Ongoing  Note: Pt will be satisfied with pain control. Pt uses numeric pain rating scale with reassessments after pain med administration. Will continue to monitor progression throughout shift.

## 2020-10-08 NOTE — PLAN OF CARE
Problem: Falls - Risk of:  Goal: Will remain free from falls  Description: Will remain free from falls  10/8/2020 0305 by Yasmine Landaverde RN  Outcome: Ongoing  10/7/2020 1311 by Norberto Sanchez RN  Outcome: Ongoing  Goal: Absence of physical injury  Description: Absence of physical injury  10/8/2020 0305 by Yasmine Landaverde RN  Outcome: Ongoing  10/7/2020 1311 by Norberto Sanchez RN  Outcome: Ongoing     Problem: Pain:  Goal: Pain level will decrease  Description: Pain level will decrease  10/8/2020 0305 by Yasmine Landaverde RN  Outcome: Ongoing  10/7/2020 1311 by Norberto Sanchez RN  Outcome: Ongoing  Goal: Control of acute pain  Description: Control of acute pain  10/8/2020 0305 by Yasmine Landaverde RN  Outcome: Ongoing  10/7/2020 1311 by Norberto Sanchez RN  Outcome: Ongoing  Goal: Control of chronic pain  Description: Control of chronic pain  10/8/2020 0305 by Yasmine Landaverde RN  Outcome: Ongoing  10/7/2020 1311 by Norberto Sanchez RN  Outcome: Ongoing     Problem: Skin Integrity:  Goal: Will show no infection signs and symptoms  Description: Will show no infection signs and symptoms  10/8/2020 0305 by Yasmine Landaverde RN  Outcome: Ongoing  10/7/2020 1311 by Norberto Sanchez RN  Outcome: Ongoing  Goal: Absence of new skin breakdown  Description: Absence of new skin breakdown  10/8/2020 0305 by Yasmine Landaverde RN  Outcome: Ongoing  10/7/2020 1311 by Norberto Sanchez RN  Outcome: Ongoing

## 2020-10-08 NOTE — PROGRESS NOTES
Pt shift assessment completed. Pt is alert and orient * 4. Doesn't appear to be in pain. Pt ambulate in room with two assist .pt follows command. .Pt respiration are regular and unlabored and on room air. Breath sounds CTA. PIV flushed,patent and CDI. Scheduled medications given as ordered. Patient encouraged to use call light with any needs. Patient states understanding, call light in reach, bed alarm on. All safety checks in place and will continue to monitor pt.

## 2020-10-08 NOTE — CARE COORDINATION
Case Management  Discharge Summary      DISCHARGE TO: Home with [address]:  [facility]     TRANSPORTATION: Family     ORDERS FAXED TO:  D/C order and faxed called to [place] with [facility]     HENS: N-A    PRE-CERTIFICATION OBTAINED: N-A    NURSE RESPONSIBLE FOR COMPLETION OF PAGE ONE OF CONTINUITY OF CARE ([address]) FORM, RECONCILIATION OF MEDICATIONS, AND TO PLACE A COPY OF FORMS IN PATIENT'S HARD CHART. NURSE TO ENSURE THAT ANY WRITTEN PRESCRIPTIONS ARE GIVEN TO PT UPON DISCHARGE. Patient aware of and agreeable to all arrangements and states no further discharge planning needs.

## 2020-10-08 NOTE — PROGRESS NOTES
Occupational Therapy  Facility/Department: [facility] B3 - MED SURG  Daily Treatment Note  NAME: Stanton Liang  : 1940  MRN: [id number]    Date of Service: 10/8/2020    Discharge Recommendations:  Subacute/Skilled Nursing Facility     Assessment   Performance deficits / Impairments: Decreased functional mobility ; Decreased endurance;Decreased ADL status; Decreased balance;Decreased strength;Decreased safe awareness;Decreased cognition  Assessment: Pt demo's poor standing balance, requiring min to mod A to transfer and perform sit to stand. She is unable to self-correct balance. LE ADLs are max/D for dressing and toileting with purewick. Pt appears to be functioning below baseline for ADLs and transfers. Recommend SNF at d/c. Cont skilled OT in acute care. Prognosis: Fair  OT Education: OT Role;Transfer Training;ADL Adaptive Strategies; Family Education;Plan of Care  Patient Education: disease specific ed:  d/c planning, safe transfers with RW. Cont OT ed. REQUIRES OT FOLLOW UP: Yes  Activity Tolerance  Activity Tolerance: Patient limited by fatigue;Treatment limited secondary to decreased cognition  Safety Devices  Safety Devices in place: Yes  Type of devices: Left in chair;Call light within reach;Nurse notified; Chair alarm in place;Gait belt       Patient Diagnosis(es): The primary encounter diagnosis was Chest pain, unspecified type. A diagnosis of General weakness was also pertinent to this visit. has a past medical history of Arthritis, CAD (coronary artery disease), History of blood transfusion, Hypertension, Orthostatic hypotension, and Pacemaker. has a past surgical history that includes joint replacement; Cardiac surgery; Hysterectomy; Upper gastrointestinal endoscopy; pr egd flex removal lesion(s) by hot biopsy forceps (2018); and Upper gastrointestinal endoscopy (N/A, 2018).     Restrictions  Restrictions/Precautions  Restrictions/Precautions: General Precautions, Fall Risk, Up as Tolerated  Position Activity Restriction  Other position/activity restrictions: High fall risk, Purewick  Subjective   General  Chart Reviewed: Yes, Progress Notes  Patient assessed for rehabilitation services?: Yes  Family / Caregiver Present: Yes(daughter at end of session)  Referring Practitioner: Kenny Palomo MD  Diagnosis: generalized weakness  Subjective  Subjective: Pt resting in bed, required vc's to awaken  General Comment  Comments: RN approved therapy  Pain Assessment  Pain Assessment: 0-10  Pain Level: 3  Pain Type: Chronic pain  Pain Location: Back  Non-Pharmaceutical Pain Intervention(s): Ambulation/Increased Activity;Repositioned; Therapeutic presence  Response to Pain Intervention: Patient Satisfied  Vital Signs  Patient Currently in Pain: Yes   Orientation  Orientation  Overall Orientation Status: Impaired  Orientation Level: Oriented to person;Oriented to place; Disoriented to time;Disoriented to situation  Objective    ADL  Feeding: Setup; Beverage management  Grooming: Supervision(comb hair while seated)  LE Dressing: Maximum assistance(socks)  Toileting: Dependent/Total(purewick)     Balance  Sitting Balance: Stand by assistance  Standing Balance: Moderate assistance(min to mod A with RW)  Standing Balance  Activity: Bed to chair transfer with min A with RW. Stood again from chair with mod A x1 min. Demo's posterior lean in standing, requiring assist to correct. Bed mobility  Supine to Sit: Minimal assistance(to L side of bed with HOB elevated, use of bedrail)  Sit to Supine: Unable to assess  Transfers  Stand Pivot Transfers: Minimal assistance(RW)  Sit to stand: Moderate assistance  Stand to sit: Moderate assistance(to control descent)      Cognition  Arousal/Alertness: Delayed responses to stimuli  Following Commands: Follows one step commands with increased time; Follows one step commands with repetition  Attention Span: Attends with cues to redirect  Memory: Decreased recall of recent events  Safety Judgement: Decreased awareness of need for assistance  Problem Solving: Assistance required to generate solutions;Assistance required to identify errors made;Decreased awareness of errors  Insights: Decreased awareness of deficits  Initiation: Requires cues for some  Sequencing: Requires cues for some       Plan   Plan  Times per week: 3-5x/wk  Current Treatment Recommendations: Patient/Caregiver Education & Training, Strengthening, Balance Training, Functional Mobility Training, Safety Education & Training, Self-Care / ADL, Endurance Training  AM-PAC Score   AM-PAC Inpatient Daily Activity Raw Score: 14 (10/08/20 1034)  AM-PAC Inpatient ADL T-Scale Score : 33.39 (10/08/20 1034)  ADL Inpatient CMS 0-100% Score: 59.67 (10/08/20 1034)  ADL Inpatient CMS G-Code Modifier : CK (10/08/20 1034)  Goals  Short term goals  Time Frame for Short term goals: by d/c ( 1 week 10/13/20)  Short term goal 1: Pt will complete toileting with supervision-ongoing, max/D with joe  Short term goal 2: Pt will complete toilet transfers with supervision-ongoing, min/mod A with RW to chair  Short term goal 3: Pt will complete functioning mobility to bathroom with LRAD at supervision [address] 10/8  Short term goal 4: Pt will tolerate x3 mins of functional standing ADL at sink by 10/11-NESTOR 10/8    If pt is discharged prior to next OT session, this note will serve as the discharge summary.   Therapy Time   Individual Concurrent Group Co-treatment   Time In 0930         Time Out 0955         Minutes 25         Timed Code Treatment Minutes: Õie 16 OT

## 2020-10-20 NOTE — DISCHARGE SUMMARY
Hospital Medicine Discharge Summary    Patient ID: Homer Sandhoff      Patient's PCP: Anju White MD    Admit Date: 10/5/2020     Discharge Date: 10/8/2020      Admitting Physician: Shi Villa MD     Discharge Physician: LAVELLE Chance - CNP     Discharge Diagnoses: Active Hospital Problems    Diagnosis    Chest pain [R07.9]    Generalized weakness [R53.1]    CAD (coronary artery disease) [I25.10]    Weakness generalized [R53.1]    Paroxysmal atrial fibrillation (HCC) [I48.0]    Essential hypertension [I10]    Cardiac pacemaker in situ [Z95.0]       The patient was seen and examined on day of discharge and this discharge summary is in conjunction with any daily progress note from day of discharge. Hospital Course:     79 y.o. female who presented to [facility] with above complaints  Pt with PMHx of HTN, HLD, AFib, obesity, sinus node dysfunction PPM presented to the ER today with complaints of weakness and chest pain.  EMS was called to the patient's home to help lift the patient, after the patient slid down to the ground from the chair, and unable to get up.  Patient usually declines EMS transport to the ED however decided to come in today as she was also having accompanying chest pain.  Patient reports epigastric to retrosternal pain dull aching intermittent that started earlier today with no relation to exertion.  Patient reports she usually ambulates with a walker or cane.  Daughter at home helps with her care. Chest pain, atypical.   -serial troponin negative. EKG negative.  -less likely cardiac in nature. -ECHO 12/2019 - EF 55% with grade I DD. Generalized weakness/deconditioning   -PT/OT consulted  -home care ordered.     Sinus node dysfunction: stable  -s/p dual pacemaker implant in 2007 with gen change in 2016 ([employer])     Hypertension-controlled, resume home medication regimen.     Paroxysmal A.  Fib - not on any anticoagulation, likely due to frequent falls.    -On metoprolol XL - dose reduced to borderline BPs.     Morbid obesity, BMI 43.7  Complicating assessment and treatment, placing patient at high risk for multiple co-morbidities as well as early death and contributing to the patient's presentation. Counseled on weight loss.     RLS-resume Requip     HLD-resume statin           Physical Exam Performed:     BP (!) 110/52   Pulse 60   Temp 97.4 °F (36.3 °C) (Oral)   Resp 17   Ht 4' 10\" (1.473 m)   Wt 200 lb (90.7 kg)   SpO2 92%   BMI 41.80 kg/m²       General appearance:  No apparent distress, appears stated age and cooperative. HEENT:  Normal cephalic, atraumatic without obvious deformity. Pupils equal, round, and reactive to light. Extra ocular muscles intact. Conjunctivae/corneas clear. Neck: Supple, with full range of motion. No jugular venous distention. Trachea midline. Respiratory:  Normal respiratory effort. Clear to auscultation, bilaterally without Rales/Wheezes/Rhonchi. Cardiovascular:  Regular rate and rhythm with normal S1/S2 without murmurs, rubs or gallops. Abdomen: Soft, non-tender, non-distended with normal bowel sounds. Musculoskeletal:  No clubbing, cyanosis or edema bilaterally. Full range of motion without deformity. Skin: Skin color, texture, turgor normal.  No rashes or lesions. Neurologic:  Neurovascularly intact without any focal sensory/motor deficits. Cranial nerves: II-XII intact, grossly non-focal.  Psychiatric:  Alert and oriented, thought content appropriate, normal insight  Capillary Refill: Brisk,< 3 seconds   Peripheral Pulses: +2 palpable, equal bilaterally       Labs:  For convenience and continuity at follow-up the following most recent labs are provided:      CBC:    Lab Results   Component Value Date    WBC 12.2 10/05/2020    HGB 14.2 10/05/2020    HCT 42.8 10/05/2020     10/05/2020       Renal:    Lab Results   Component Value Date     10/05/2020    K 4.0 10/05/2020    K 3.6 12/13/2019     10/05/2020    CO2 25 10/05/2020    BUN 17 10/05/2020    CREATININE 0.8 10/05/2020    CALCIUM 9.4 10/05/2020         Significant Diagnostic Studies    Radiology:   CT Head WO Contrast   Final Result   No definite acute intracranial abnormality. CT Cervical Spine WO Contrast   Final Result   No acute abnormality of the cervical spine. CT ABDOMEN PELVIS W IV CONTRAST Additional Contrast? None   Final Result   Negative CT examination. No evidence of acute process in the abdomen or   pelvis. XR CHEST PORTABLE   Final Result   No acute finding or significant change. Consults:     IP CONSULT TO HOSPITALIST  IP CONSULT TO HOME CARE NEEDS    Disposition:  Home with home health     Condition at Discharge: Stable    Discharge Instructions/Follow-up:  F/u with PCP in 1-2 weeks. Code Status:  Full    Activity: activity as tolerated    Diet: cardiac diet      Discharge Medications:     Discharge Medication List as of 10/8/2020 12:10 PM           Details   metoprolol succinate (TOPROL XL) 25 MG extended release tablet Take 1 tablet by mouth daily, Disp-30 tablet,R-3Normal              Details   omeprazole (PRILOSEC) 20 MG delayed release capsule TAKE 1 CAPSULE DAILY, Disp-90 capsule,R-3Normal      gabapentin (NEURONTIN) 300 MG capsule Take 1 capsule by mouth 3 times daily for 5 days. , Disp-15 capsule, R-0Print      bisacodyl (BISACODYL) 5 MG EC tablet Take 4 tablets of Bisacodyl for colonoscopy prep as directed., Disp-4 tablet, R-0Normal      meclizine (ANTIVERT) 25 MG tablet Take 1 tablet by mouth daily Patient is only taking 1 daily, Disp-90 tablet, R-3Normal      Elastic Bandages & Supports (JOBST KNEE HIGH COMPRESSION SM) MISC DAILY Starting u 7/18/2019, Disp-1 each, R-2, Print      potassium chloride (MICRO-K) 10 MEQ extended release capsule Take 10 mEq by mouth every other dayHistorical Med      rOPINIRole (REQUIP) 0.5 MG tablet Take 0.5 mg by mouth nightlyHistorical Med      sertraline (ZOLOFT) 100 MG tablet Take 100 mg by mouth dailyHistorical Med      atorvastatin (LIPITOR) 40 MG tablet Take 40 mg by mouth dailyHistorical Med             Time Spent on discharge is more than 30 minutes in the examination, evaluation, counseling and review of medications and discharge plan. Signed:    LAVELLE Hannah - CNP   10/20/2020      Thank you Alec Christensen MD for the opportunity to be involved in this patient's care. If you have any questions or concerns please feel free to contact me at [phone].

## 2020-10-20 NOTE — TELEPHONE ENCOUNTER
Received a courtesy call from [facility] that patient was admitted to hospital 10/8/2020. Patient was phoned to schedule hospital f/u.  Unable to reach patient,

## 2020-11-17 PROBLEM — A41.9 SEPSIS DUE TO URINARY TRACT INFECTION (HCC): Status: ACTIVE | Noted: 2020-01-01

## 2020-11-17 PROBLEM — N39.0 SEPSIS DUE TO URINARY TRACT INFECTION (HCC): Status: ACTIVE | Noted: 2020-01-01

## 2020-11-17 NOTE — ED NOTES
[address] [facility]  SHANIA, UTI, AMS  [address] called back     Ivonne Yung  11/17/20 1147

## 2020-11-17 NOTE — CONSULTS
Nephrology Consult Note  [email or web address]        Reason for Consult: SHANIA  Consulting Physician: Saul Sy MD    HISTORY OF PRESENT ILLNESS:      The patient is a 79 y.o. female with significant past medical history of CAD, HTN and arthritis who presents with AMS. History was obtained from review of EHR and talking to the care team since the patient is unable to provide information. Apparently, was living with her daughter but not able to take care of her. Found at home covered with feces. She was tachycardic on arrival with BP borderline low. Labs showed a serum creatinine of 5.8mg/dL, previously 0.8mg/dL on 10/5/20. BUN was 181, Na 149, lactic acid 2.3. She received 1L NS IVF bolus and we were asked to see her for further evaluation and management. Past Medical History:        Diagnosis Date    Arthritis     CAD (coronary artery disease)     pacemaker/defib    History of blood transfusion     Hypertension     Orthostatic hypotension     Pacemaker        Past Surgical History:        Procedure Laterality Date    CARDIAC SURGERY      pacemaker, defib    HYSTERECTOMY      JOINT REPLACEMENT      right and left    [place] EGD FLEX REMOVAL LESION(S) BY HOT BIOPSY FORCEPS  11/12/2018    ESOPHAGEAL DILATION Daryel Jurist #[id number] performed by Norm Navarrete DO at [facility] ENDOSCOPY      erosive gastritis     UPPER GASTROINTESTINAL ENDOSCOPY N/A 11/12/2018    EGD BIOPSY performed by Norm Navarrete DO at [facility] ENDOSCOPY       Current Medications:    No current facility-administered medications on file prior to encounter.       Current Outpatient Medications on File Prior to Encounter   Medication Sig Dispense Refill    metoprolol succinate (TOPROL XL) 25 MG extended release tablet Take 1 tablet by mouth daily 30 tablet 3    omeprazole (PRILOSEC) 20 MG delayed release capsule TAKE 1 CAPSULE DAILY 90 capsule 3    gabapentin (NEURONTIN) 300 MG capsule Take 1 capsule by Physically abused: Not on file     Forced sexual activity: Not on file   Other Topics Concern    Not on file   Social History Narrative    Not on file       Family History:   History reviewed. No pertinent family history. REVIEW OF SYSTEMS:    Review of Systems   Unable to obtain      PHYSICAL EXAM:    Vitals:    BP (!) 149/108   Pulse 87   Temp 97.6 °F (36.4 °C) (Axillary)   Resp 18   Wt 185 lb (83.9 kg)   SpO2 100%   BMI 38.67 kg/m²   No intake/output data recorded. I/O this shift:  In: 1300 [IV Piggyback:1300]  Out: -     Physical Exam:  Physical Exam  Vitals signs reviewed. Constitutional:       General: She is not in acute distress. HENT:      Head: Normocephalic and atraumatic. Eyes:      General: No scleral icterus. Conjunctiva/sclera: Conjunctivae normal.   Cardiovascular:      Rate and Rhythm: Regular rhythm. Tachycardia present. Heart sounds: No friction rub. Pulmonary:      Effort: Pulmonary effort is normal. No respiratory distress. Breath sounds: No wheezing. Abdominal:      General: Bowel sounds are normal. There is no distension. Tenderness: There is no abdominal tenderness. Musculoskeletal:      Right lower leg: No edema. Left lower leg: No edema. Neurological:      Mental Status: She is lethargic. DATA:    Recent Labs     11/17/20  0947   WBC 18.8*   HGB 17.3*   HCT 54.0*   MCV 96.1        Recent Labs     11/17/20  0947   *   K 4.5      CO2 20*   GLUCOSE 116*   MG 3.50*   *   CREATININE 5.8*   LABGLOM 7*   GFRAA 8*           IMPRESSION/RECOMMENDATIONS:      Acute Kidney Injury.  - Likely from dehydration +/- ATN. - Urinalysis with trace of protein and blood, +LE, bacteria suggestive of UTI.  - CT of A/P did not show any renal obstruction.   - Will give additional 2L NS IVF bolus since BP is borderline low then start mIVF at 75cc/hr.  - She is making some urine so hopeful that she will improve with supportive care.  - Please avoid any nephrotoxic agents such as NSAIDs or IV contrast unless deemed necessary.  - Renal function panel BID. Hypernatremia.  - From decreased PO fluid intake. No sign of polyuria. - Start 1/2 NS at 75cc/hr. Metabolic Acidosis. - From lactic acid and SHANIA. - Anticipate to improve with improvement on hemodynamics and SHANIA. Sepsis. - UTI vs pneumonia.  - COVID-19 test negative. - On empiric antibiotics pending culture results. AMS. - Per primary service. Thank you for allowing me to participate in the care of this patient. Please do not hesitate to contact me at [phone] if with questions.     Taylor Black MD  11/17/2020  [facility]

## 2020-11-17 NOTE — ED PROVIDER NOTES
performed by Leonila Erickson DO at [address]       Previous Medications    ATORVASTATIN (LIPITOR) 40 MG TABLET    Take 40 mg by mouth daily    BISACODYL (BISACODYL) 5 MG EC TABLET    Take 4 tablets of Bisacodyl for colonoscopy prep as directed. ELASTIC BANDAGES & SUPPORTS (JOBST KNEE HIGH COMPRESSION SM) MISC    1 each by Does not apply route daily    GABAPENTIN (NEURONTIN) 300 MG CAPSULE    Take 1 capsule by mouth 3 times daily for 5 days. MECLIZINE (ANTIVERT) 25 MG TABLET    Take 1 tablet by mouth daily Patient is only taking 1 daily    METOPROLOL SUCCINATE (TOPROL XL) 25 MG EXTENDED RELEASE TABLET    Take 1 tablet by mouth daily    OMEPRAZOLE (PRILOSEC) 20 MG DELAYED RELEASE CAPSULE    TAKE 1 CAPSULE DAILY    POTASSIUM CHLORIDE (MICRO-K) 10 MEQ EXTENDED RELEASE CAPSULE    Take 10 mEq by mouth every other day    ROPINIROLE (REQUIP) 0.5 MG TABLET    Take 0.5 mg by mouth nightly    SERTRALINE (ZOLOFT) 100 MG TABLET    Take 100 mg by mouth daily       ALLERGIES     Patient has no known allergies. FAMILY HISTORY     History reviewed. No pertinent family history.        SOCIAL HISTORY       Social History     Socioeconomic History    Marital status:      Spouse name: None    Number of children: None    Years of education: None    Highest education level: None   Occupational History    None   Social Needs    Financial resource strain: None    Food insecurity     Worry: None     Inability: None    Transportation needs     Medical: None     Non-medical: None   Tobacco Use    Smoking status: Never Smoker    Smokeless tobacco: Never Used   Substance and Sexual Activity    Alcohol use: No    Drug use: No    Sexual activity: None   Lifestyle    Physical activity     Days per week: None     Minutes per session: None    Stress: None   Relationships    Social connections     Talks on phone: None     Gets together: None     Attends Church service: None Active member of club or organization: None     Attends meetings of clubs or organizations: None     Relationship status: None    Intimate partner violence     Fear of current or ex partner: None     Emotionally abused: None     Physically abused: None     Forced sexual activity: None   Other Topics Concern    None   Social History Narrative    None       SCREENINGS                PHYSICAL EXAM    (up to 7 for level 4, 8 or more for level 5)     ED Triage Vitals   BP Temp Temp src Pulse Resp SpO2 Height Weight   -- -- -- -- -- -- -- --       Physical Exam  Vitals signs and nursing note reviewed. Exam conducted with a chaperone present (LOUISA Barger). Constitutional:       General: She is in acute distress (mild). Appearance: She is well-developed. She is morbidly obese. She is ill-appearing and toxic-appearing. She is not diaphoretic. Interventions: She is not intubated. HENT:      Head: Normocephalic. Jaw: No trismus. Right Ear: External ear normal.      Left Ear: External ear normal.      Mouth/Throat:      Mouth: Mucous membranes are dry. Eyes:      Pupils: Pupils are equal, round, and reactive to light. Neck:      Musculoskeletal: Neck supple. No neck rigidity. Cardiovascular:      Rate and Rhythm: Regular rhythm. Tachycardia present. Pulses: Normal pulses. Radial pulses are 2+ on the right side and 2+ on the left side. Pulmonary:      Effort: Pulmonary effort is normal. No tachypnea, bradypnea, accessory muscle usage, prolonged expiration, respiratory distress or retractions. She is not intubated. Breath sounds: Normal air entry. No stridor. Decreased breath sounds present. No wheezing. Abdominal:      General: Abdomen is flat. Palpations: Abdomen is soft. Tenderness: There is no abdominal tenderness. There is no guarding or rebound. Musculoskeletal:         General: Tenderness (left wrist) present. No deformity or signs of injury.    Skin: General: Skin is warm. Findings: Lesion (near vagina) present. Neurological:      General: No focal deficit present. Mental Status: She is lethargic and confused. GCS: GCS eye subscore is 3. GCS verbal subscore is 3. GCS motor subscore is 5. Psychiatric:         Mood and Affect: Mood is depressed. Speech: She is noncommunicative. Behavior: Behavior is uncooperative.              DIAGNOSTIC RESULTS   :    Labs Reviewed   CBC WITH AUTO DIFFERENTIAL - Abnormal; Notable for the following components:       Result Value    WBC 18.8 (*)     RBC 5.62 (*)     Hemoglobin 17.3 (*)     Hematocrit 54.0 (*)     Neutrophils Absolute 15.6 (*)     Monocytes Absolute 1.5 (*)     All other components within normal limits    Narrative:     Performed at:  [place] [address] [employer]   Phone [phone]   COMPREHENSIVE METABOLIC PANEL - Abnormal; Notable for the following components:    Sodium 149 (*)     CO2 20 (*)     Anion Gap 22 (*)     Glucose 116 (*)      (*)     CREATININE 5.8 (*)     GFR Non- 7 (*)     GFR African American 8 (*)     Albumin/Globulin Ratio 0.9 (*)     All other components within normal limits    Narrative:     Jie Awan tel. [phone],  Chemistry results called to and read back by Rosana Myers RN, 11/17/2020  10:41, by [facility]  Chemistry results called to and read back by Rosana Myers RN, 11/17/2020  10:38, by [facility]  Performed at:  [place] [address] [employer]   Phone [phone]   BLOOD GAS, VENOUS - Abnormal; Notable for the following components:    pH, Ham 7.316 (*)     pCO2, Ham 38.7 (*)     pO2, Ham 47.0 (*)     HCO3, Venous 19.3 (*)     Base Excess, Ham -6.2 (*)     Carboxyhemoglobin 1.8 (*)     All other components within normal limits    Narrative:     Performed at:  [facility]  [address] [place] [address], [place], [facility] [place] [employer]   Phone [phone]   LACTIC ACID, PLASMA - Abnormal; Notable for the following components:    Lactic Acid 2.3 (*)     All other components within normal limits    Narrative:     Performed at:  [address], [facility] [employer]   Phone [phone] - Abnormal; Notable for the following components:    Clarity, UA CLOUDY (*)     Blood, Urine TRACE-INTACT (*)     Protein, UA TRACE (*)     Leukocyte Esterase, Urine LARGE (*)     All other components within normal limits    Narrative:     Performed at:  [address], [facility] [employer]   Phone [phone]   TROPONIN - Abnormal; Notable for the following components:    Troponin 0.12 (*)     All other components within normal limits    Narrative:     Glory Marshall tel. [phone],  Chemistry results called to and read back by Blue Li RN, 11/17/2020  10:41, by [facility]  Chemistry results called to and read back by Blue Li RN, 11/17/2020  10:38, by [facility]  Performed at:  [address], [facility] [employer]   Phone [phone]   MAGNESIUM - Abnormal; Notable for the following components:    Magnesium 3.50 (*)     All other components within normal limits    Narrative:     Glory Marshall tel. [phone],  Chemistry results called to and read back by Blue Li RN, 11/17/2020  10:41, by [facility]  Chemistry results called to and read back by Blue Li RN, 11/17/2020  10:38, by [facility]  Performed at:  [address], [facility] [employer]   Phone [phone]   MICROSCOPIC URINALYSIS - Abnormal; Notable for the following components:    WBC, UA >100 (*)     RBC, UA 5-10 (*)     Bacteria, UA 3+ (*)     All other components within normal limits    Narrative:     Performed at:  [facility]  [address] [place] [address], [place] [address] [employer]   Phone [phone]   CK - Abnormal; Notable for the following components:     Total  (*)     All other components within normal limits    Narrative:     Stephan Rosita tel. [phone],  Chemistry results called to and read back by Mica Wise RN, 11/17/2020  10:41, by [facility]  Chemistry results called to and read back by Mica Wise RN, 11/17/2020  10:38, by [facility]  Performed at:  [place] [address] [employer]   Phone [phone] PEPTIDE - Abnormal; Notable for the following components:    Pro-BNP 3,779 (*)     All other components within normal limits    Narrative:     Stephan Rosita tel. [phone],  Chemistry results called to and read back by Mica Wise RN, 11/17/2020  10:41, by [facility]  Chemistry results called to and read back by Mica Wise RN, 11/17/2020  10:38, by [facility]  Performed at:  [place] [address] [employer]   Phone [phone]   RAPID INFLUENZA A/B ANTIGENS    Narrative:     Performed at:  [place] [address] [employer]   Phone [phone]   CULTURE, BLOOD 1   CULTURE, BLOOD 2   AMMONIA    Narrative:     Performed at:  [place] [address] [employer]   Phone [phone]   ETHANOL    Narrative:     Evan Koehler [phone],  Chemistry results called to and read back by Mica Wise RN, 11/17/2020  10:41, by [facility]  Chemistry results called to and read back by Mica Wise RN, 11/17/2020  10:38, by [facility]  Performed at:  [place] [address] [employer]   Phone [phone]   URINE DRUG SCREEN    Narrative:     Performed at:  [place] [address] [employer]   Phone [phone]    Narrative:     Performed at:  [facility]) - [facility]  [address],  [place], [place]   Phone [phone]   LACTIC ACID, PLASMA    Narrative:     Performed at:  [facility]  [address],  [place], [address] [place]   Phone [phone]   RENAL FUNCTION PANEL   TROPONIN   TROPONIN   RENAL FUNCTION PANEL       All other labs were within normal range or not returned asof this dictation. EKG: All EKG's are interpreted by the Emergency Department Physician who either signs or Co-signs this chart in the absence of a cardiologist.    The Ekg interpreted by me shows  sinus tachycardia, [id number]  Axis is   Left axis deviation  QTc is  borderline prolonged QT  Intervals and Durations are unremarkable. ST Segments: nonspecific changes  Significant change from prior EKG dated - 10/5/20  No STEMI, rhythm today is sinus (paced with old EKG)           RADIOLOGY:   Non-plain film images such as CT, Ultrasound and MRI are read by the radiologist. Emaline Norah images are visualized and preliminarily interpreted by the  ED Provider with the belowfindings:        Interpretation per the Radiologist below, if available at the time of this note:    CT ABDOMEN PELVIS WO CONTRAST   Final Result   No acute inflammatory process or bowel obstruction. Negative nephrolithiasis or obstructive uropathy. CT HEAD WO CONTRAST   Final Result   No acute intracranial abnormality. CT CERVICAL SPINE WO CONTRAST   Final Result   . No acute abnormality of the cervical spine. Moderate cervical spondylosis. Moderate to severe facet arthropathy. Prior fusion of C5 and C6. No   significant change from the prior study. XR WRIST LEFT (MIN 3 VIEWS)   Final Result   No acute fracture. Mild soft tissue swelling. Degenerative changes. XR CHEST PORTABLE   Final Result   Interval development increase interstitial markings bilaterally when compared   to October 5, 2020. Patchy appearance infiltrate specially the left raising   the possibly of infiltrates               PROCEDURES   Unless otherwise noted below, none     Procedures    CRITICAL CARE TIME   Time: 40 minutes   Includes examination, speaking with consultants, lab interpretation, charting, treating for severe sepsis with IV antibiotics and IV fluids along with treating for acute kidney injury with IV fluids  Excludes separate billable procedures. Patient at risk for serious decompensation if not treated for this life-threatening condition. CONSULTS: Spoke with Dr. Maggie Jarquin with nephrology and he will see the patient in the hospital in regards to her acute kidney injury. Spoke with Dr. Wilfred Dempsey at 3198 for admission. IP CONSULT TO NEPHROLOGY  IP CONSULT TO HOSPITALIST    EMERGENCY DEPARTMENT COURSE and DIFFERENTIAL DIAGNOSIS/MDM:   Vitals:    Vitals:    11/17/20 1300 11/17/20 1400 11/17/20 1500 11/17/20 1601   BP: 108/61 (!) 149/108 134/75 (!) 152/54   Pulse: 95 87 94 93   Resp: 30 18 21 24   Temp:       TempSrc:       SpO2: 95% 100% 100% 100%   Weight:           Patient was given the following medications:  Medications   0.9 % sodium chloride bolus (2,000 mLs Intravenous New Bag 11/17/20 1505)   0.45 % sodium chloride infusion (has no administration in time range)   0.9 % sodium chloride bolus (0 mLs Intravenous Stopped 11/17/20 1209)   0.9 % sodium chloride bolus (0 mLs Intravenous Stopped 11/17/20 1209)   cefTRIAXone (ROCEPHIN) 1 g IVPB in 50 mL D5W minibag (0 g Intravenous Stopped 11/17/20 1313)   azithromycin (ZITHROMAX) 500 mg in D5W 250ml addavial (0 mg Intravenous Stopped 11/17/20 1411)     Patient was evaluated due to concern for altered mental status with lethargy and being noncommunicative on arrival.  Her daughter told EMS she cannot take care of the patient. She does have some lesions to the vaginal area and also reportedly to the buttocks per nursing staff.   LOUISA Barger was in the room during my evaluation. History and physical are limited due to altered mental status. She will need further evaluation in the hospital with further treatment due to concern for AMS. Lab work was concerning for acute kidney injury with significant elevated BUN which would explain her altered mental status. Urinalysis was concerning for infection as well and therefore she was started on Rocephin. X-ray mention the possibility of pneumonia versus pulmonary edema and therefore she was also started on azithromycin at the request of hospitalist.  Lactic acidosis did improve with initial fluids. She will need further evaluation in the hospital with nephrology consultation. Vitals remained stable while in the ED but again she was altered most likely from her acute kidney injury and uremic syndrome. CT of the head and cervical spine did not show any acute findings per radiologist.      The patient tolerated their visit well. The patient and / or the family were informed of the results of any tests, a time was given to answer questions. FINAL IMPRESSION      1. Severe sepsis ([address]    2. Acute kidney injury ([address]    3. Altered mental status, unspecified altered mental status type    4. Acute cystitis with hematuria          DISPOSITION/PLAN   DISPOSITION  - decision to admit       PATIENT REFERRED TO:  No follow-up provider specified.     DISCHARGEMEDICATIONS:  New Prescriptions    No medications on file       DISCONTINUED MEDICATIONS:  Discontinued Medications    No medications on file              (Please note that portions of this note were completed with a voicerecognition program.  Efforts were made to edit the dictations but occasionally words are mis-transcribed.)    Pietro Dey MD (electronically signed)            Pietro Dey MD  11/17/20 0461

## 2020-11-17 NOTE — H&P
Hospital Medicine History & Physical      PCP: Gwendolyn Wan MD    Date of Admission: 11/17/2020    Date of Service: Pt seen/examined on 11/17/20 and Admitted to Inpatient with expected LOS greater than two midnights due to medical therapy. Chief Complaint   Patient presents with    Fatigue     pt lives with daughter, pt daughter is unable take care of pt at home    Cough     History Of Present Illness:  79 y.o. female with PMH as mentioned below who presented to [facility] ED with c/o altered mental status and increasing lethargy. Patient currently is obtunded and unable to provide any history to this provider. Most of the history obtained from ED provider and chart review. As per ED physician report, patient lives with her daughter, who reportedly cannot take care of her. Per nursing staff, she had feces on her face upon arrival.  Further information is severely limited due to lethargy and patient not being responsive. She would open her eyes to command but otherwise would not tell her name or move her extremities other than to pain. GCS on arrival was noted to be approximately 11. ED course : Patient hemodynamically stable upon arrival to ED. Blood work suggestive of severe SHANIA with BUN/creatinine-181/5.8 (normal creatinine on October 5 2020). She was also noted to be hypernatremic sodium 149. Rapid Covid test negative. CT abdomen pelvis without acute inflammatory process or bowel obstruction. Negative for nephrolithiasis or obstructive uropathy. Chest x-ray with increased interstitial markings bilaterally. CT C-spine negative for fracture. UA showed UTI. Patient also met sepsis criteria with elevated lactic acid 2.3 and leukocytosis. Empirically started on Rocephin in ED after obtaining cultures and hospitalist consulted for admission for further evaluation and management    Upon evaluation by me in ED, patient received close to 2 L fluid.   Continue to be lethargic and obtunded. Evaluated by nephrology with plans to continue IV hydration at this time and monitor I's/O and BMP. I tried to reach patient's daughter Drucilla Blizzard on the number listed in epic ([phone]) but she would not answer. Past Medical History:      Diagnosis Date    Arthritis     CAD (coronary artery disease)     pacemaker/defib    History of blood transfusion     Hypertension     Orthostatic hypotension     Pacemaker        Past Surgical History:        Procedure Laterality Date    CARDIAC SURGERY      pacemaker, defib    HYSTERECTOMY      JOINT REPLACEMENT      right and left    DC EGD FLEX REMOVAL LESION(S) BY HOT BIOPSY FORCEPS  11/12/2018    ESOPHAGEAL DILATION Ciarra Will #54 performed by Dina John DO at [facility] ENDOSCOPY      erosive gastritis     UPPER GASTROINTESTINAL ENDOSCOPY N/A 11/12/2018    EGD BIOPSY performed by Dina John DO at [facility] ENDOSCOPY       Medications Prior to Admission:    Prior to Admission medications    Medication Sig Start Date End Date Taking? Authorizing Provider   metoprolol succinate (TOPROL XL) 25 MG extended release tablet Take 1 tablet by mouth daily 10/9/20   LAVELLE Montoya CNP   omeprazole (PRILOSEC) 20 MG delayed release capsule TAKE 1 CAPSULE DAILY 7/27/20   Madelin Bills MD   gabapentin (NEURONTIN) 300 MG capsule Take 1 capsule by mouth 3 times daily for 5 days. 12/14/19 12/19/19  Taisha Chahal MD   bisacodyl (BISACODYL) 5 MG EC tablet Take 4 tablets of Bisacodyl for colonoscopy prep as directed.  12/12/19   Wayne Pennington MD   meclizine (ANTIVERT) 25 MG tablet Take 1 tablet by mouth daily Patient is only taking 1 daily 8/15/19   Zahraa Lozano MD   Elastic Bandages & Supports (JOBST KNEE HIGH COMPRESSION SM) MISC 1 each by Does not apply route daily 7/18/19   LAVELLE Olsen CNP   potassium chloride (MICRO-K) 10 MEQ extended release capsule Take 10 mEq by mouth every other day    Historical Provider, MD   rOPINIRole (REQUIP) 0.5 MG tablet Take 0.5 mg by mouth nightly    Historical Provider, MD   sertraline (ZOLOFT) 100 MG tablet Take 100 mg by mouth daily    Historical Provider, MD   atorvastatin (LIPITOR) 40 MG tablet Take 40 mg by mouth daily    Historical Provider, MD       Allergies:  Patient has no known allergies. Social History:    The patient currently lives with her daughter and UTO prior to Admission Details     TOBACCO:   reports that she has never smoked. She has never used smokeless tobacco.  ETOH:   reports no history of alcohol use. Family History:     Reviewed in detail and negative for DM, CAD, Cancer, CVA. Positive as follows:    History reviewed. No pertinent family history. REVIEW OF SYSTEMS:   Pertinent positives as noted in the HPI. All other systems reviewed and negative. PHYSICAL EXAM PERFORMED:  /75   Pulse 94   Temp 97.6 °F (36.4 °C) (Axillary)   Resp 21   Wt 185 lb (83.9 kg)   SpO2 100%   BMI 38.67 kg/m²     General appearance:  No apparent distress, patient unkempt and disheveled in appearance with a low thick oral secretions noted on her face. HEENT:  Normal cephalic, atraumatic without obvious deformity. Pupils equal, round, and reactive to light. Extra ocular muscles intact. Conjunctivae/corneas clear. Neck: Supple, with full range of motion. No jugular venous distention. Trachea midline. Respiratory:  Normal respiratory effort. Clear to auscultation, bilaterally without Rales/Wheezes/Rhonchi. Diminished breath sounds at bases  Cardiovascular:  Regular rate and rhythm with normal S1/S2 without murmurs, rubs or gallops. Abdomen: Soft, non-tender, non-distended with normal bowel sounds. Musculoskeletal:  No clubbing, cyanosis or edema bilaterally. Full range of motion without deformity. Skin: Skin color, texture, turgor normal.  No rashes or lesions.   Neurologic: Lethargic and obtunded  Psychiatric: Lethargic and obtunded  Capillary Refill: Brisk,< 3 seconds   Peripheral Pulses: +2 palpable, equal bilaterally       Labs:   Recent Labs     11/17/20  0947   WBC 18.8*   HGB 17.3*   HCT 54.0*        Recent Labs     11/17/20  0947   *   K 4.5      CO2 20*   *   CREATININE 5.8*   CALCIUM 10.5     Recent Labs     11/17/20  0947   AST 23   ALT 10   BILITOT 0.6   ALKPHOS 85     No results for input(s): INR in the last 72 hours. Recent Labs     11/17/20  0947   CKTOTAL 246*   TROPONINI 0.12*       Urinalysis:    Lab Results   Component Value Date    NITRU Negative 11/17/2020    WBCUA >100 11/17/2020    BACTERIA 3+ 11/17/2020    RBCUA 5-10 11/17/2020    BLOODU TRACE-INTACT 11/17/2020    SPECGRAV 1.025 11/17/2020    GLUCOSEU Negative 11/17/2020       EKG:  I have reviewed the EKG with the following interpretation: [email or web address], left axis deviation, age undetermined inferior infarct noted. Nonspecific ST-T wave changes. Radiology:    I have reviewed the Imaging  with the following interpretation:  CT ABDOMEN PELVIS WO CONTRAST   Final Result   No acute inflammatory process or bowel obstruction. Negative nephrolithiasis or obstructive uropathy. CT HEAD WO CONTRAST   Final Result   No acute intracranial abnormality. CT CERVICAL SPINE WO CONTRAST   Final Result   . No acute abnormality of the cervical spine. Moderate cervical spondylosis. Moderate to severe facet arthropathy. Prior fusion of C5 and C6. No   significant change from the prior study. XR WRIST LEFT (MIN 3 VIEWS)   Final Result   No acute fracture. Mild soft tissue swelling. Degenerative changes. XR CHEST PORTABLE   Final Result   Interval development increase interstitial markings bilaterally when compared   to October 5, 2020.   Patchy appearance infiltrate specially the left raising   the possibly of infiltrates           Active Hospital Problems    Diagnosis Date Noted    Sepsis due to urinary tract infection ([facility] [address] [A41.9, N39.0] 11/17/2020     ASSESSMENT/PLAN:  1. Sepsis due to urinary tract infection POA  -As evidenced by leukocytosis, lactic acidosis, abnormal urine analysis. -Ordered 30 mils per KG fluid bolus in ED; repeat lactic; continue IV hydration.  -Empirically started on Rocephin and azithromycin ED; continue pending cultures. 2.  Acute kidney injury -BUN/creatinine-181/5.8 on admission  -Likely due to severe dehydration in addition to sepsis POA  -CT abdomen pelvis negative for nephrolithiasis and obstructive uropathy.  -Continue aggressive IV hydration and anticipate improvement. Strict I's/O monitoring. Monitor BMP. -Nephrology consulted from ED -evaluated with plans to continue IV hydration at this time and monitor her progress. 3.  Acute hypernatremia -sodium 149 on admission consistent with dehydration.  -Continue IV hydration and monitor BMP. Anticipate improvement. 4.  Acute metabolic encephalopathy -likely due to above  -CT head negative for acute intracranial abnormality and CT C-spine negative for fracture.  -Continue neurochecks and supportive care. 5.  Hypertension -blood pressure normal on admission. Monitor off antihypertensives for the first 24 hours given sepsis and will resume home medications as able    6. History of pacemaker -stable    7. Elevated troponin POA -likely in the setting of SHANIA. Troponin 0.12 on admission. Continue to trend. DVT Prophylaxis: Heparin subcu 5000 units 3 times daily  Diet: No diet orders on file  Code Status: Prior    PT/OT Eval Status: Not yet ordered    Dispo -anticipate 3-4 day stay in the hospital     Ruby Foy MD    The note was completed using Dragon -speech recognition software & EMR  . Every effort was made to ensure accuracy; however, inadvertent computerized transcription errors may be present. Thank you Jae Macario MD for the opportunity to be involved in this patient's care.  If you have any questions or concerns please feel free to contact me at [phone]. Addendum at 1600  -Received call back from Dorene (patient's older daughter) , who is not the one who lives with patient. Dorene reports patient and her Sister Dinorah(who lives with patient) were not on speaking terms. Dorene does not have Sister Dinorah's phone number to share with this provider . Dorene did state that she has a copy of living will for the patient and that patient's [address] [place] [address] Avenue would be her [place]. Tried reaching [place] at  but she would not answer. Requested Dorene to provide a copy of patient's living will.     Nat Garcia MD  Hospitalist Physician

## 2020-11-17 NOTE — ED NOTES
Bed: 17  Expected date:   Expected time:   Means of arrival:   Comments:  1024 Roxy Roy RN  11/17/20 [id number]

## 2020-11-18 NOTE — PROGRESS NOTES
Hospitalist Progress Note      PCP: Donato Nam MD    Date of Admission: 11/17/2020    Chief Complaint   Patient presents with    Fatigue     pt lives with daughter, pt daughter is unable take care of pt at home   [place] Cough        Hospital Course: Reviewed H&P     Subjective: Patient seen and examined at bedside this morning. Her color has improved. Slightly more alert. Attempts to speak, but difficult for her to do so. Mcgowan noted on bedside with card from \"Lori" - Cr Juarez is reportedly her son. As per discussion with nephrologist, Cr Juarez said that his sister, Padmini Johnson, is the primary caregiver for their mother and that Michele Hdez has been unable to do so the last couple of weeks due to work-related stress. \" He says his mother's illness seems to have come on quickly, as Michele Hdez was fine a couple of weeks ago\" and has reportedly been saying that she is \"unhappy\" at home prior to this presentation. Medications:  Reviewed    Infusion Medications    sodium bicarbonate infusion 100 mL/hr at 11/18/20 1113     Scheduled Medications    sodium chloride flush  10 mL Intravenous 2 times per day    cefTRIAXone (ROCEPHIN) IV  1 g Intravenous Q24H    azithromycin  500 mg Intravenous Q24H    sodium chloride flush  10 mL Intravenous 2 times per day    heparin (porcine)  5,000 Units Subcutaneous 3 times per day     PRN Meds: sodium chloride flush, sodium chloride flush, acetaminophen **OR** acetaminophen, polyethylene glycol, promethazine **OR** ondansetron      Intake/Output Summary (Last 24 hours) at 11/18/2020 1330  Last data filed at 11/18/2020 0411  Gross per 24 hour   Intake 2250 ml   Output 800 ml   Net 1450 ml       Physical Exam Performed:    /83   Pulse 108   Temp 97.9 °F (36.6 °C) (Axillary)   Resp 20   Ht 4' 10\" (1.473 m)   Wt 178 lb 9.2 oz (81 kg)   SpO2 93%   BMI 37.32 kg/m²     General appearance:  No apparent distress, patient unkempt and disheveled in appearance.   HEENT:  Normal cephalic, atraumatic without obvious deformity. Pupils equal, round, and reactive to light. Extra ocular muscles intact. Conjunctivae/corneas clear. Neck: Supple, with full range of motion. No jugular venous distention. Trachea midline. Respiratory:  Normal respiratory effort. Clear to auscultation, bilaterally without Rales/Wheezes/Rhonchi. Diminished breath sounds at bases. Cardiovascular:  Regular rate and rhythm with normal S1/S2 without murmurs, rubs or gallops. Abdomen: Soft, non-tender, non-distended with normal bowel sounds. Musculoskeletal:  No clubbing, cyanosis or edema bilaterally. Full range of motion without deformity. Skin: Skin color, texture, turgor normal.  No rashes or lesions. Neurologic: Lethargic, responds to pain. Psychiatric: Lethargic and obtunded. Capillary Refill: Brisk,< 3 seconds   Peripheral Pulses: +2 palpable, equal bilaterally        Labs:   Recent Labs     11/17/20  0947 11/18/20  0350   WBC 18.8* 14.4*   HGB 17.3* 14.7   HCT 54.0* 46.4    195     Recent Labs     11/17/20  0947 11/17/20  1751 11/18/20  0350   * 147* 155*   K 4.5 4.0 3.9  3.9    113* 123*   CO2 20* 21 15*   * 157* 155*   CREATININE 5.8* 5.0* 4.4*   CALCIUM 10.5 8.8 9.1   PHOS  --  5.6* 5.3*     Recent Labs     11/17/20  0947   AST 23   ALT 10   BILITOT 0.6   ALKPHOS 85     No results for input(s): INR in the last 72 hours. Recent Labs     11/17/20  0947 11/17/20  1751 11/17/20  2127   CKTOTAL 246*  --   --    TROPONINI 0.12* 0.11* 0.11*       Urinalysis:      Lab Results   Component Value Date    NITRU Negative 11/17/2020    WBCUA >100 11/17/2020    BACTERIA 3+ 11/17/2020    RBCUA 5-10 11/17/2020    BLOODU TRACE-INTACT 11/17/2020    SPECGRAV 1.025 11/17/2020    GLUCOSEU Negative 11/17/2020       Radiology:  CT ABDOMEN PELVIS WO CONTRAST   Final Result   No acute inflammatory process or bowel obstruction. Negative nephrolithiasis or obstructive uropathy.          CT HEAD WO CONTRAST   Final Result   No acute intracranial abnormality. CT CERVICAL SPINE WO CONTRAST   Final Result   . No acute abnormality of the cervical spine. Moderate cervical spondylosis. Moderate to severe facet arthropathy. Prior fusion of C5 and C6. No   significant change from the prior study. XR WRIST LEFT (MIN 3 VIEWS)   Final Result   No acute fracture. Mild soft tissue swelling. Degenerative changes. XR CHEST PORTABLE   Final Result   Interval development increase interstitial markings bilaterally when compared   to October 5, 2020. Patchy appearance infiltrate specially the left raising   the possibly of infiltrates             Assessment/Plan:    Active Hospital Problems    Diagnosis Date Noted    Sepsis due to urinary tract infection ([facility] [address] [A41.9, N39.0] 11/17/2020     1. Sepsis due to urinary tract infection POA -clinically improving  -As evidenced by leukocytosis, lactic acidosis, abnormal urine analysis. -Ordered 30 mils per KG fluid bolus in ED; repeat lactic down to 1.6; continue IV hydration.  -Empirically started on Rocephin and azithromycin ED; continue pending cultures. - WBC count down to 14.4 as on 11/18/2020 from 18.8 on admission.  - Blood cultures negative for growth as of 11/18/2020.     2. Acute kidney injury - BUN/creatinine-181/5.8 on admission  -Likely due to severe dehydration in addition to sepsis POA  -CT abdomen pelvis negative for nephrolithiasis and obstructive uropathy.  -Continue aggressive IV hydration and anticipate improvement. Strict I's/O monitoring. Monitor BMP. -Nephrology consulted from ED - evaluated with plans to continue IV hydration at this time and monitor her progress.  -Cr down to 4.4 as of 11/18/2020.      3.   Acute hypernatremia   - sodium 149 on admission consistent with dehydration, increased to 155 on 11/18/2020.   - Nephrology following, recommended changing IVF to D5 with 50meq of bicarb at 100cc/hr  - Check serum sodium q6H  - Correct serum sodium to around 140mmol/L by tomorrow morning     4. Acute metabolic encephalopathy - likely due to above  - CT head negative for acute intracranial abnormality and CT C-spine negative for fracture. - Continue neurochecks and supportive care.     5. Hypertension - blood pressure normal on admission.    - Monitor off antihypertensives for the first 24 hours given sepsis and will resume home medications as able     6. History of pacemaker - stable     7.  Elevated troponin POA - likely in the setting of SHANIA. - Troponin 0.12 on admission. Down trended to 0.11    DVT Prophylaxis: Heparin subcu 5000 units 3 times daily  Diet: Diet NPO Effective Now  Code Status: Full Code    PT/OT Eval Status: Not yet ordered    Dispo - Unclear, will likely require a minimum of 2-3 more days inpatient. Clearance from nephrology. Patient case seen and discussed under supervision of preceptor, Dr. Esa Hernández. Note made by PA-S2 student in the status of a scribe, with review by preceptor. Dina SORENSON-S2      Addendum to PA student progress note:  Pt seen, examined, and evaluated with PA student, who acted as my scribe for the above documentation. I have reviewed the current history, physical findings, labs, assessment, and plan and agree with the note as documented. Kaye Tamayo MD  Hospitalist Physician       The note was completed using EMR. Every effort was made to ensure accuracy; However, inadvertent computerized transcription errors may be present.

## 2020-11-18 NOTE — PROGRESS NOTES
Patient admitted to room 443 from ED. Patient oriented to room, call light, bed rails, phone, lights and bathroom. Patient instructed about the schedule of the day including: vital sign frequency, lab draws, possible tests, frequency of MD and staff rounds, including RN/MD rounding together at bedside, daily weights, and I &O's. Patient instructed about prescribed diet, how to use 8MENU, and television. bed alarm in place, patient aware of placement and reason. Telemetry box  121 in place, patient aware of placement and reason. Bed locked, in lowest position, side rails up 2/4, call light within reach. Will continue to monitor. Suction set up at bedside. Continuous oxygen monitoring connected to tele box, cmu able to visualize. Will defer admission to oncoming RN on night shift.

## 2020-11-19 NOTE — CONSULTS
Pharmacy to Manage Heparin Infusion per [facility]    Dx: PE? Pt wt = 57.9 kg - adjusted. Baseline aPTT = Pending. High Dose Heparin Infusion  Heparin 80 units/kg IVP bolus followed by Heparin infusion at 18 units/kg/hr. Recheck aPTT in 6 hours. Goal aPTT = 49-76 seconds. Reshma Julio, PharmD  11/19/2020 4:21 AM    11/19 1014  aPTT = 113.7 sec  Hold heparin drip for 1 hour and decrease rate to 6.9 ml/hr  Recheck aPTT in 6 hours at [address]  11/19/2020 at 1:16 PM      11/19/20  1950  High dose Heparin GTT:  APTT at 1900 is 68 seconds. No changes, no bolus. Continue infusion at 6.9mL/hr and recheck the aPTT at 010 per protocol. Therapeutic aPTT range is 49-76 seconds. Kim Weston PharmD 11/19/2020 7:49 PM    11/20/2020  Recent Labs     11/20/20  0135   APTT 58.3*     Continue heparin gtt at 6.9 mL/hr. Check aPTT daily.   Reshma Julio, PharmD  11/20/2020 1:52 AM

## 2020-11-19 NOTE — FLOWSHEET NOTE
11/19/20 1244   Encounter Summary   Services provided to: Family   Referral/Consult From: Multi-disciplinary team   Continue Visiting   (11-19, initial)   Complexity of Encounter Moderate   Length of Encounter 30 minutes   Grief and Life Adjustment   Type Adjustment to illness   Assessment Calm; Approachable; Concerns with suffering   Intervention Active listening;Explored feelings, thoughts, concerns; Discussed illness/injury and it's impact   Outcome Engaged in conversation;Expressed feelings/needs/concerns;Receptive   Advance Directives (For Healthcare)   Healthcare Directive Unknown, patient unable to respond due to medical condition    met with son, patient unresponsive. He states his sister, Ne Jarquin, saw the patient has a Living Will and is looking for that. They are uncertain if the patient actually has a health care power of , although there is some thought that their sibling, Lisa Cook, with whom the patient lives, has this documents. The son states things are not going well at the patient's home. When asked specifically what he was referring to, he states sister was not allowing visiting nurses or physicians into the house. He feels he wants to [place] her a chance\" to get better. But if she doesn't, he does not want her going back home. I reviewed with him the [place] proxy default decision making order if there are no documents. Patient has three adult children and not spouse. We will continue to follow as situation unfolds. He states no present spiritual needs.

## 2020-11-19 NOTE — SIGNIFICANT EVENT
Cross cover paged regarding patient's ongoing and worsening hypoxia--    Was on 5L per NC, now up to 15L NRB mask. Ordered stat CXR: Bibasilar airspace disease not significantly changed. Stat ABG: Unremarkable    D-dimer: 1016    In setting of worsening hypoxia and tachycardia, will start patient on IV heparin gtt for suspected pulmonary embolism. Ordered VQ scan as patient unable to have CTPA due to kidney function. Ordered VL duplex bilateral lower extremities for DVT rule out. Discussed patient's case with my attending, Dr. Brigette Mcgowan. Marc Suarez.  Zev CNP

## 2020-11-19 NOTE — PROGRESS NOTES
4 Eyes Skin Assessment     The patient is being assess for   Low ben    I agree that 2 RN's have performed a thorough Head to Toe Skin Assessment on the patient. ALL assessment sites listed below have been assessed. Areas assessed by both nurses:   [x]   Head, Face, and Ears   [x]   Shoulders, Back, and Chest, Abdomen  [x]   Arms, Elbows, and Hands   [x]   Coccyx, Sacrum, and Ischium  [x]   Legs, Feet, and Heels            Co-signer eSignature: Electronically signed by Adriano Blakely RN on 11/19/20 at 5:17 PM EST    Does the Patient have Skin Breakdown?   Yes LDA WOUND CARE was Initiated documentation include the Priscilla-wound, Wound Assessment, Measurements, Dressing Treatment, Drainage, and Color\",          Ben Prevention initiated:  Yes   Wound Care Orders initiated:  Yes      [id number]  nurse consulted for Pressure Injury (Stage 3,4, Unstageable, DTI, NWPT, Complex wounds)and New or Established Ostomies:  Yes      Primary Nurse eSignature: Electronically signed by Ciarra Ramey RN on 11/19/20 at 12:41 PM EST

## 2020-11-19 NOTE — PROGRESS NOTES
Nephrology Progress Note   [email or web address]      This patient is a 79year old female whom we are following for SHANIA. Subjective: The patient was seen and examined. Still lethargic. Developed hypoxia and tachycardia last night and IVF was discontinued. Now on heparin drip for suspected PE. Family History: No family at bedside. ROS: Unable to obtain      Vitals:  BP (!) 161/103   Pulse 103   Temp 98.1 °F (36.7 °C) (Oral)   Resp 27   Ht 4' 10\" (1.473 m)   Wt 181 lb 9.6 oz (82.4 kg)   SpO2 93%   BMI 37.95 kg/m²   I/O last 3 completed shifts: In: 7346 [I.V.:1518]  Out: 1050 [Urine:1050]  No intake/output data recorded. Physical Exam:  Physical Exam  Vitals signs reviewed. Constitutional:       General: She is not in acute distress. HENT:      Head: Normocephalic and atraumatic. Mouth/Throat:      Mouth: Mucous membranes are dry. Eyes:      General: No scleral icterus. Conjunctiva/sclera: Conjunctivae normal.   Cardiovascular:      Rate and Rhythm: Tachycardia present. Heart sounds: No friction rub. Pulmonary:      Effort: Pulmonary effort is normal. No respiratory distress. Breath sounds: Rales present. No wheezing. Abdominal:      General: Bowel sounds are normal. There is no distension. Tenderness: There is no abdominal tenderness. Musculoskeletal:      Right lower leg: No edema. Left lower leg: No edema. Neurological:      Mental Status: She is lethargic.            Medications:   collagenase   Topical Daily    sodium chloride flush  10 mL Intravenous 2 times per day    cefTRIAXone (ROCEPHIN) IV  1 g Intravenous Q24H    azithromycin  500 mg Intravenous Q24H    sodium chloride flush  10 mL Intravenous 2 times per day         Labs:  Recent Labs     11/17/20  0947 11/18/20  0350 11/19/20  0219   WBC 18.8* 14.4* 10.0   HGB 17.3* 14.7 14.2   HCT 54.0* 46.4 43.0   MCV 96.1 97.3 94.5    195 203     Recent Labs     11/17/20  0947 11/17/20  1751 11/18/20  0350  11/19/20  0219 11/19/20  0807 11/19/20  1357   * 147* 155*   < > 153* 154* 153*   K 4.5 4.0 3.9  3.9  --  3.1*  3.1*  --   --     113* 123*  --  118*  --   --    CO2 20* 21 15*  --  23  --   --    GLUCOSE 116* 121* 90  --  121*  --   --    PHOS  --  5.6* 5.3*  --  3.8  --   --    MG 3.50*  --   --   --  2.40  --   --    * 157* 155*  --  124*  --   --    CREATININE 5.8* 5.0* 4.4*  --  3.2*  --   --    LABGLOM 7* 8* 10*  --  14*  --   --    GFRAA 8* 10* 12*  --  17*  --   --     < > = values in this interval not displayed. Assessment/Plan:    Acute Kidney Injury.  - Likely from dehydration +/- ATN. - Urinalysis with trace of protein and blood, +LE, bacteria suggestive of UTI.  - CT of A/P did not show any hydronephrosis. - Kidney function is slowly improving, non-oliguric. Continue with gentle IVF hydration.  - Please avoid any nephrotoxic agents such as NSAIDs or IV contrast unless deemed necessary.  - Renal function panel daily.     Hypernatremia.  - From decreased PO fluid intake/dehydration. Not polyuric.  - With hypoxia, will decrease hypotonic IVF rate to 50cc/hr. Hypokalemia. - PRN supplementation.     Metabolic Acidosis. - From lactic acid and SHANIA. - Improved with bicarb drip.     Sepsis. - UTI vs pneumonia.  - COVID-19 test negative. - On empiric antibiotics pending culture results. - Leukocytosis improving.     AMS. - Likely from metabolic encephalopathy.  - Further work-up per per primary service. Discussed with Dr. Jose D Bingham. Thank you for allowing me to participate in the care of this patient. Please do not hesitate to contact me at [phone] if with questions. Please do not hesitate to contact me at [phone] if with questions. Thank you!     Cora Cassidy MD  11/19/2020  [facility]

## 2020-11-19 NOTE — CONSULTS
[facility] Wound Ostomy Continence Nurse  Consult Note       NAME:  Joselin Gonzalez RECORD NUMBER:  [id number]  AGE: 79 y.o. GENDER: female  : 1940  TODAY'S DATE:  2020    Subjective: non verbal   Reason for WOCN Evaluation and Assessment: 2 abdominals wound etiology unknown; L Buttock Unstageable Pressure Injury      Bishop Curtis is a 79 y.o. female referred by:   [] Physician  [x] Nursing  [] Other:     Wound Identification:  Wound Type: pressure and undetermined  Contributing Factors: chronic pressure, decreased mobility, shear force and incontinence of stool    Wound History: 79 y.o. female with PMH as mentioned below who presented to [facility] ED with c/o altered mental status and increasing lethargy. Patient currently is obtunded and unable to provide any history to this provider. Most of the history obtained from ED provider and chart review. As per ED physician report, patient lives with her daughter, who reportedly cannot take care of her. Per nursing staff, she had feces on her face upon arrival.  Further information is severely limited due to lethargy and patient not being responsive. She would open her eyes to command but otherwise would not tell her name or move her extremities other than to pain. GCS on arrival was noted to be approximately 11.   Current Wound Care Treatment: Abdominal Wounds - hydrogel; L Buttock - Santyl    Patient Goal of Care:  [x] Wound Healing  [] Odor Control  [] Palliative Care  [] Pain Control   [] Other:         PAST MEDICAL HISTORY        Diagnosis Date    Arthritis     CAD (coronary artery disease)     pacemaker/defib    History of blood transfusion     Hypertension     Orthostatic hypotension     Pacemaker        PAST SURGICAL HISTORY    Past Surgical History:   Procedure Laterality Date    CARDIAC SURGERY      pacemaker, defib    HYSTERECTOMY      JOINT REPLACEMENT      right and left    MA EGD FLEX REMOVAL LESION(S) BY HOT BIOPSY FORCEPS  11/12/2018    ESOPHAGEAL DILATION Constanza Damico #54 performed by Lydia Palomares DO at [facility] ENDOSCOPY      erosive gastritis     UPPER GASTROINTESTINAL ENDOSCOPY N/A 11/12/2018    EGD BIOPSY performed by Lydia Palomares DO at [facility]    History reviewed. No pertinent family history. SOCIAL HISTORY    Social History     Tobacco Use    Smoking status: Never Smoker    Smokeless tobacco: Never Used   Substance Use Topics    Alcohol use: No    Drug use: No       ALLERGIES    No Known Allergies    MEDICATIONS    No current facility-administered medications on file prior to encounter. Current Outpatient Medications on File Prior to Encounter   Medication Sig Dispense Refill    metoprolol succinate (TOPROL XL) 25 MG extended release tablet Take 1 tablet by mouth daily 30 tablet 3    omeprazole (PRILOSEC) 20 MG delayed release capsule TAKE 1 CAPSULE DAILY 90 capsule 3    gabapentin (NEURONTIN) 300 MG capsule Take 1 capsule by mouth 3 times daily for 5 days. 15 capsule 0    bisacodyl (BISACODYL) 5 MG EC tablet Take 4 tablets of Bisacodyl for colonoscopy prep as directed.  4 tablet 0    meclizine (ANTIVERT) 25 MG tablet Take 1 tablet by mouth daily Patient is only taking 1 daily 90 tablet 3    Elastic Bandages & Supports (JOBST KNEE HIGH COMPRESSION SM) MISC 1 each by Does not apply route daily 1 each 2    potassium chloride (MICRO-K) 10 MEQ extended release capsule Take 10 mEq by mouth every other day      rOPINIRole (REQUIP) 0.5 MG tablet Take 0.5 mg by mouth nightly      sertraline (ZOLOFT) 100 MG tablet Take 100 mg by mouth daily      atorvastatin (LIPITOR) 40 MG tablet Take 40 mg by mouth daily         Objective: F/C; withdrawn; non verbal    BP (!) 161/103   Pulse 103   Temp 98.1 °F (36.7 °C) (Oral)   Resp 27   Ht 4' 10\" (1.473 m)   Wt 181 lb 9.6 oz (82.4 kg)   SpO2 93%   BMI 37.95 kg/m² LABS:  WBC:    Lab Results   Component Value Date    WBC 10.0 11/19/2020     H/H:    Lab Results   Component Value Date    HGB 14.2 11/19/2020    HCT 43.0 11/19/2020     PTT:    Lab Results   Component Value Date    APTT 113.7 11/19/2020   [APTT}  PT/INR:  No results found for: PROTIME, INR  HgBA1c:  No results found for: LABA1C    Assessment: Mid abdomen - two small wounds; slight bleeding; some eschar  L Buttock - soft brown eschar; mushy to the touch; foul odor   Braulio Risk Score: Braulio Scale Score: 11    Patient Active Problem List   Diagnosis Code    Orthostatic hypotension I95.1    Cardiac pacemaker in situ Z95.0    Sinus node dysfunction (HCC) I49.5    Essential hypertension I10    Restless legs G25.81    Gastroesophageal reflux disease without esophagitis K21.9    Mild episode of recurrent major depressive disorder (HCC) F33.0    Nerve pain M79.2    Paroxysmal atrial fibrillation (HCC) I48.0    Rectal bleeding K62.5    Shortness of breath R06.02    Weakness generalized R53.1    CAD (coronary artery disease) I25.10    Chest pain R07.9    Generalized weakness R53.1    Sepsis due to urinary tract infection (HCC) A41.9, N39.0       Measurements:  Wound 11/17/20 Buttocks Inner;Left 5.5 cm x 3 cm (Active)   Wound Image   11/19/20 1520   Wound Etiology Pressure Unstageable 11/19/20 1520   Dressing Status Intact; New drainage noted 11/19/20 1520   Wound Cleansed Cleansed with saline 11/19/20 1520   Dressing/Treatment Pharmaceutical agent (see MAR); Moist to moist;ABD 11/19/20 1520   Dressing Change Due 11/19/20 11/19/20 1520   Wound Length (cm) 5.5 cm 11/19/20 1520   Wound Width (cm) 4 cm 11/19/20 1520   Wound Depth (cm) 0 cm 11/19/20 1520   Wound Surface Area (cm^2) 22 cm^2 11/19/20 1520   Change in Wound Size % (l*w) -33.33 11/19/20 1520   Wound Volume (cm^3) 0 cm^3 11/19/20 1520   Wound Assessment Eschar moist 11/19/20 1520   Drainage Amount Scant 11/19/20 1520   Drainage Description Purulent 11/19/20 1520   Odor Malodorous/putrid 11/19/20 1520   Priscilla-wound Assessment Blanchable erythema 11/19/20 1520   Margins Attached edges 11/19/20 1520   Number of days: 1    L Buttock:         Wound 11/17/20 Abdomen Mid;Upper (Active)   Wound Image   11/19/20 1520   Wound Etiology Other 11/19/20 1520   Dressing Status Intact; New drainage noted 11/19/20 1520   Wound Cleansed Cleansed with saline 11/19/20 1520   Dressing/Treatment Hydrating gel; Foam 11/19/20 1520   Dressing Change Due 11/20/20 11/19/20 1520   Wound Length (cm) 1 cm 11/19/20 1520   Wound Width (cm) 1.5 cm 11/19/20 1520   Wound Depth (cm) 0 cm 11/19/20 1520   Wound Surface Area (cm^2) 1.5 cm^2 11/19/20 1520   Change in Wound Size % (l*w) -50 11/19/20 1520   Wound Volume (cm^3) 0 cm^3 11/19/20 1520   Wound Assessment Eschar dry 11/19/20 1520   Drainage Amount Scant 11/19/20 1520   Drainage Description Sanguinous 11/19/20 1520   Odor None 11/19/20 1520   Priscilla-wound Assessment Dry/flaky 11/19/20 1520   Margins Attached edges 11/19/20 1520   Number of days: 1    Mid Abdomen Upper Wound:         Wound 11/18/20 Sacrum Mid (Active)   Dressing Status Clean;Dry; Intact; New dressing applied 11/19/20 0323   Wound Cleansed Cleansed with saline 11/18/20 0815   Dressing/Treatment Foam 11/19/20 0323   Drainage Description Yellow 11/19/20 0323   Number of days: 1       Wound 11/19/20 Abdomen Mid;Lower (Active)   Wound Image   11/19/20 1520   Wound Etiology Other 11/19/20 1520   Dressing Status Intact; New drainage noted 11/19/20 1520   Wound Cleansed Cleansed with saline 11/19/20 1520   Dressing/Treatment Hydrating gel; Foam 11/19/20 1520   Dressing Change Due 11/20/20 11/19/20 1520   Wound Length (cm) 1.2 cm 11/19/20 1520   Wound Width (cm) 1.3 cm 11/19/20 1520   Wound Depth (cm) 0.1 cm 11/19/20 1520   Wound Surface Area (cm^2) 1.56 cm^2 11/19/20 1520   Wound Volume (cm^3) 0.16 cm^3 11/19/20 1520   Wound Assessment Pink/red 11/19/20 1520   Drainage Amount Scant 11/19/20 1520   Drainage Description Sanguinous 11/19/20 1520   Odor None 11/19/20 1520   Priscilla-wound Assessment Dry/flaky 11/19/20 1520   Margins Attached edges 11/19/20 1520   Number of days: 0   Mid Abdomen Lower Wound:      Response to treatment:  Well tolerated by patient.      Pain Assessment:  Severity:  NESTOR / 10  Quality of pain: N/A  Wound Pain Timing/Severity: none  Premedicated: No    Plan   Plan of Care: Wound 11/17/20 Buttocks Inner;Left 5.5 cm x 3 cm-Dressing/Treatment: Pharmaceutical agent (see MAR), Moist to moist, ABD(Santyl)  Wound 11/17/20 Abdomen Mid;Upper-Dressing/Treatment: Hydrating gel, Foam  Wound 11/18/20 Sacrum Mid-Dressing/Treatment: Foam  Wound 11/19/20 Abdomen Mid;Lower-Dressing/Treatment: Hydrating gel, Foam   Recommendation: Abdominal Wounds - clean with NS; pat dry; hydrogel; foam dressing; change daily  L Buttock - clean with NS; pat dry; nickel thick Santyl; moist-moist dressing; dry dressing; medipore; change daily  Call Wound Care for deterioration [phone]    Specialty Bed Required : Yes ordered  [x] Low Air Loss   [x] Pressure Redistribution  [] Fluid Immersion  [] Bariatric  [] Total Pressure Relief  [] Other:     Current Diet: Diet NPO Effective Now  Dietician consult:  No    Discharge Plan:  Placement for patient upon discharge: skilled nursing    Patient appropriate for Outpatient [address]: Yes    Referrals:  [x]  following  [] [address] [place] [facility]  [] Supplies  [] Other    Patient/Caregiver Teaching:  Level of patient/caregiver understanding able to:   [] Indicates understanding       [] Needs reinforcement  [] Unsuccessful      [] Verbal Understanding  [] Demonstrated understanding       [] No evidence of learning  [] Refused teaching         [x] N/A       Electronically signed by Nasir Redmond RN, Hugo Friedman on 11/19/2020 at 3:49 PM

## 2020-11-19 NOTE — CARE COORDINATION
CASE MANAGEMENT INITIAL ASSESSMENT      Reviewed chart and completed assessment with: patient's son   Explained Case Management role/services. Primary contact information: Lillie Bansal - son - in absence of [id number] [address]:  Who do you trust or have selected to make healthcare decisions for you? Name:   Lillie Bansal  Phone  Number:  [phone]  Can this person be reached and be able to respond quickly, such as within a few minutes or hours? Yes  Who would be your back-up decision maker? Name Margaret Abraham  Phone Number: [phone]  Above 2 contacts have been involved with patient as nobody has seen or heard from supposed [address] [place] [address] [facility]). Admit date/status: 11/17/20   Diagnosis:sepsis   Is this a Readmission?:  No      Insurance:[employer]   Precert required for SNF: Yes       3 night stay required: No    Living arrangements, Adls, care needs, prior to admission: lives in a house with her daughter    Transportation: family     Durable Medical Equipment at home:  Walker_X_Cane_X_RTS__ BSC__Shower Chair_X_  02__ HHN__ CPAP__  BiPap__  Hospital Bed__ W/C__X_ Other__________    Services in the home and/or outpatient, prior to admission: none    Dialysis Facility (if applicable)   · Name:  · Address:  · Dialysis Schedule:  · Phone:  · Fax:    PT/OT recs: none, will need when patient able to participate    Hospital Exemption Notification (HEN): not initiated    Barriers to discharge: absence of POA    Plan/comments: Patient lives at home with her daughter and daughter assists with ADL's, however, it appears that patient is not being cared for adequately at home. Spoke with patient's son at bedside. He is in agreement with his other sister, Margaret Abraham, that it would be best to try to get patient to SNF at discharge. Ultimately, they are hoping to transition her to long term care due she is not getting adequate care and is stuck at home much of the time by herself.  Patient is still unresponsive today so explained to son that JACKELYN will address this with patient once she turns the corner. [facility] to start developing a plan should patient be agreeable once able to participate with plan. He states he would like a referral to be made to [facility]. Placed call to [facility] with [facility] [place] and notified of referral.  She is aware that therapy will eventually need to work with patient when medically appropriate but she will continue to follow along with her progress. Of note, son had requested to speak to spiritual services. Placed call to Lyndsey Arizmendi with [facility] and she states she will see patient today. CM will continue to follow.      ECOC on chart for MD signature

## 2020-11-19 NOTE — PROGRESS NOTES
4 Eyes Skin Assessment     The patient is being assess for   Shift Handoff    I agree that 2 RN's have performed a thorough Head to Toe Skin Assessment on the patient. ALL assessment sites listed below have been assessed. Areas assessed by both nurses:   [x]   Head, Face, and Ears   [x]   Shoulders, Back, and Chest, Abdomen  [x]   Arms, Elbows, and Hands   [x]   Coccyx, Sacrum, and Ischium  [x]   Legs, Feet, and Heels            Co-signer eSignature: Electronically signed by Ana Lilia Lee RN on 11/19/2020 at 12:43 AM    Does the Patient have Skin Breakdown?   Yes [facility] CARE was Initiated documentation include the Priscilla-wound, Wound Assessment, Measurements, Dressing Treatment, Drainage, and Color\",          Braulio Prevention initiated:  Yes   Wound Care Orders initiated:  Yes      [id number]  nurse consulted for Pressure Injury (Stage 3,4, Unstageable, DTI, NWPT, Complex wounds)and New or Established Ostomies:  Yes      Primary Nurse eSignature: Electronically signed by Pam Rodriguez RN on 11/19/20 at 12:07 AM EST      '

## 2020-11-20 NOTE — PROGRESS NOTES
Hospitalist Progress Note      PCP: Melinda Darby MD    Date of Admission: 11/17/2020    Chief Complaint: Fatigue and cough    Hospital Course: Reviewed H&P    Subjective: Patient continues to be obtunded w/o improvement in mentation. Does not open eyes to commands. D/w daughter Sam Espinoza ) [place] OF [employer] [facility]. POA at length at bedside and updated patient's condition and discussed goals of care. Daughter wishes to continue full code. Will DC heparin GTT given isolated distal DVT and low risk for progression to PE. D/w general surgery regarding plan of care. Patient's oxygen requirement worsened and currently requiring Vapotherm @FiO2 100%, 40 L/min. Obtain chest x-ray which showed mild vascular congestion otherwise unremarkable. Obtain echocardiogram which showed PFO/ASD and cardiology recommended possible JO. Requested cardiology consultation-evaluated with recommendations for outpatient work-up at this time. Requested pulmonology/critical care consultation for worsening hypoxia.    -Updated patient's daughter regarding patient's condition and requested a copy of living will -which showed patient would not want aggressive measures, hence CODE STATUS amended to DNR CCA. -Noted renal functions improving.   After discussion with critical care, requested neurology consultation to assess ongoing encephalopathy     Medications:  Reviewed    Infusion Medications    dextrose 75 mL/hr at 11/20/20 1415     Scheduled Medications    atorvastatin  40 mg Oral Nightly    metoprolol  2.5 mg Intravenous BID    collagenase   Topical Daily    sodium chloride flush  10 mL Intravenous 2 times per day    cefTRIAXone (ROCEPHIN) IV  1 g Intravenous Q24H    azithromycin  500 mg Intravenous Q24H     PRN Meds: perflutren lipid microspheres, sodium chloride flush, acetaminophen **OR** acetaminophen, polyethylene glycol, promethazine **OR** ondansetron      Intake/Output Summary (Last 24 hours) at 11/20/2020 1530  Last data filed at 11/20/2020 1335  Gross per 24 hour   Intake 0 ml   Output 1325 ml   Net -1325 ml       Physical Exam Performed:  /85   Pulse 103   Temp 98.1 °F (36.7 °C) (Axillary)   Resp 24   Ht 4' 10\" (1.473 m)   Wt 179 lb 3.2 oz (81.3 kg)   SpO2 92%   BMI 37.45 kg/m²     General appearance:  No apparent distress, patient continued to be obtunded  HEENT:  Normal cephalic, atraumatic without obvious deformity. Pupils equal, round, and reactive to light. Extra ocular muscles intact. Conjunctivae/corneas clear. Neck: Supple, with full range of motion. No jugular venous distention. Trachea midline. Respiratory:  Normal respiratory effort. Clear to auscultation, bilaterally without Rales/Wheezes/Rhonchi. Diminished breath sounds at bases  Cardiovascular:  Regular rate and rhythm with normal S1/S2 without murmurs, rubs or gallops. Abdomen: Soft, non-tender, non-distended with normal bowel sounds. Musculoskeletal:  No clubbing, cyanosis or edema bilaterally. Full range of motion without deformity. Skin: Skin color, texture, turgor normal.  No rashes or lesions. Neurologic: Lethargic and obtunded  Psychiatric: Lethargic and obtunded  Capillary Refill: Brisk,< 3 seconds   Peripheral Pulses: +2 palpable, equal bilaterally       Labs:   Recent Labs     11/18/20  0350 11/19/20  0219 11/20/20  0524   WBC 14.4* 10.0 8.5   HGB 14.7 14.2 13.5   HCT 46.4 43.0 40.9    203 220     Recent Labs     11/18/20  0350  11/19/20  0219 11/19/20  0807 11/19/20  1357 11/20/20  0524   *   < > 153* 154* 153* 152*   K 3.9  3.9  --  3.1*  3.1*  --   --  3.4*  3.4*   *  --  118*  --   --  117*   CO2 15*  --  23  --   --  24   *  --  124*  --   --  103*   CREATININE 4.4*  --  3.2*  --   --  2.2*   CALCIUM 9.1  --  9.1  --   --  9.5   PHOS 5.3*  --  3.8  --   --  3.9    < > = values in this interval not displayed. No results for input(s): AST, ALT, BILIDIR, BILITOT, ALKPHOS in the last 72 hours.   No results for input(s): INR in the last 72 hours. Recent Labs     11/17/20  1751 11/17/20  2127 11/19/20  0344   TROPONINI 0.11* 0.11* 0.16*       Urinalysis:      Lab Results   Component Value Date    NITRU Negative 11/17/2020    WBCUA >100 11/17/2020    BACTERIA 3+ 11/17/2020    RBCUA 5-10 11/17/2020    BLOODU TRACE-INTACT 11/17/2020    SPECGRAV 1.025 11/17/2020    GLUCOSEU Negative 11/17/2020       Radiology:  XR CHEST PORTABLE   Final Result   Left-sided dual lead pacemaker/ICD seen in place. Ill-defined left lower lobe opacity seen. No pneumothorax. Recommend   comparison with clinical exam and follow-up films. NM LUNG VENT/PERFUSION (VQ)   Final Result   Low probability for pulmonary embolus. VL Extremity Venous Bilateral   Final Result      XR CHEST PORTABLE   Final Result   Bibasilar airspace disease not significantly changed. CT ABDOMEN PELVIS WO CONTRAST   Final Result   No acute inflammatory process or bowel obstruction. Negative nephrolithiasis or obstructive uropathy. CT HEAD WO CONTRAST   Final Result   No acute intracranial abnormality. CT CERVICAL SPINE WO CONTRAST   Final Result   . No acute abnormality of the cervical spine. Moderate cervical spondylosis. Moderate to severe facet arthropathy. Prior fusion of C5 and C6. No   significant change from the prior study. XR WRIST LEFT (MIN 3 VIEWS)   Final Result   No acute fracture. Mild soft tissue swelling. Degenerative changes. XR CHEST PORTABLE   Final Result   Interval development increase interstitial markings bilaterally when compared   to October 5, 2020. Patchy appearance infiltrate specially the left raising   the possibly of infiltrates             Active Hospital Problems    Diagnosis Date Noted    Sepsis due to urinary tract infection ([facility] [address] [A41.9, N39.0] 11/17/2020     Assessment/Plan:  1.   Sepsis due to urinary tract infection POA -clinically sepsis syndrome resolved  -As evidenced by leukocytosis, lactic acidosis, abnormal urine analysis. -Ordered 30 mils per KG fluid bolus in ED; repeat lactic; continue IV hydration.  -Empirically started on Rocephin and azithromycin ED; continue pending cultures.     2. Acute kidney injury -BUN/creatinine-181/5.8 on admission -improving  -Likely due to severe dehydration in addition to sepsis POA  -CT abdomen pelvis negative for nephrolithiasis and obstructive uropathy.  -Continue aggressive IV hydration and anticipate improvement. Strict I's/O monitoring. Monitor BMP. -Nephrology consulted from ED -evaluated with plans to continue IV hydration at this time and monitor her progress.     3. Acute hypernatremia -sodium 149 on admission consistent with dehydration. Ongoing  -Nephrology following and assisting with IV fluids . monitor BMP.      4.  Acute metabolic encephalopathy -likely due to above -ongoing  -CT head negative for acute intracranial abnormality and CT C-spine negative for fracture.  -Continue neurochecks and supportive care.  -Requested neurology consultation today.     5. Hypertension -blood pressure normal on admission. Resumed IV metoprolol 2.5 mg twice daily(as patient n.p.o.) -monitor     6. History of pacemaker -stable     7.  Elevated troponin POA -likely in the setting of SHANIA. Troponin 0.12 on admission. Continue to trend. 8.  Acute right isolated distal DVT of the peroneal vein -as noted on bilateral venous duplex on 11/19/2020. VQ scan low probability for PE. Discontinue heparin GTT given low risk for progression to PE and risks outweigh benefits. 9.  Acute hypoxic respiratory failure likely in the setting of sepsis related hydration   -Currently requiring Vapotherm. Follow-up chest x-ray on 11/20/2020 with mild vascular congestion otherwise no worsening infiltrates.   -Echocardiogram obtained on 11/20/2020 s/o PFO/ASD; normal LVEF 55 to 60% with no regional wall motion

## 2020-11-20 NOTE — PROGRESS NOTES
Notified Dr. Shaista Salgado office, [place], @ [phone] 11/20/20 for neurology consult. -[phone] [facility]

## 2020-11-20 NOTE — ACP (ADVANCE CARE PLANNING)
social work tried multiple times reaching her but could not be reached) . Walker Ac wishes  to continue aggressive measures including resuscitation if need be at this time.     Plan:  As above     Code Status: At this time patient/Family  wishes to be full code     Time Spent on Advanced Planning Documents: 21 mins. The following items were considered in medical decision making:  Independent review of images , Review / order clinical lab tests. Review / order radiology tests, Decision to obtain old records. Advanced Care Planning Documents: Completed advances directives, advanced directives in chart.       Rayne Schwab, MD  11/19/2020

## 2020-11-20 NOTE — CONSULTS
Pulmonary & Critical Care Consultation Note   Dena Griffith MD    REASONFOR CONSULTATION/CC: acute resp failure, critical care management    Consult at request of  Dee Hernandez MD  PCP: Tacos Forman MD    HISTORYOF PRESENT ILLNESS:    79y.o. year old female presented to the ED with increasing delirium and lethargy. Initial workup was significant for renal failure with hypernatremia as well as UTI and associated sepsis. She was admitted, received atb therapy and IVF  Nephrology has been involved in her care. Over the last 2 days she developed worsening hypoxia and now is on Vapotherm max settings with persistent hypoxia dropping into the 70s. She remains confused when seen and direct history not possible. Past Medical History:   Diagnosis Date    Arthritis     CAD (coronary artery disease)     pacemaker/defib    History of blood transfusion     Hypertension     Orthostatic hypotension     Pacemaker        Past Surgical History:   Procedure Laterality Date    CARDIAC SURGERY      pacemaker, defib    HYSTERECTOMY      JOINT REPLACEMENT      right and left    MO EGD FLEX REMOVAL LESION(S) BY HOT BIOPSY FORCEPS  11/12/2018    ESOPHAGEAL DILATION Winferd Coral #54 performed by Lydia Palomares DO at [address] [facility] ENDOSCOPY      erosive gastritis     UPPER GASTROINTESTINAL ENDOSCOPY N/A 11/12/2018    EGD BIOPSY performed by Lydia Palomares DO at [address] [facility]       family history is not on file.     Social History     Tobacco Use    Smoking status: Never Smoker    Smokeless tobacco: Never Used   Substance Use Topics    Alcohol use: No        Scheduled Meds:   atorvastatin  40 mg Oral Nightly    metoprolol  2.5 mg Intravenous BID    collagenase   Topical Daily    sodium chloride flush  10 mL Intravenous 2 times per day    cefTRIAXone (ROCEPHIN) IV  1 g Intravenous Q24H    azithromycin  500 mg Intravenous Q24H       Continuous Infusions:   dextrose 50 mL/hr at 11/19/20 1635       PRN Meds:   perflutren lipid microspheres, sodium chloride flush, acetaminophen **OR** acetaminophen, polyethylene glycol, promethazine **OR** ondansetron   Inpatient consult to Critical Care  Consult performed by: Campbell Toro MD  Consult ordered by: Gabriela Issa MD        ALLERGIES:  Patient has No Known Allergies. REVIEW OF SYSTEMS:  Review of Systems   Unable to perform ROS: Mental status change       Objective:   PHYSICAL EXAM:  /85   Pulse 103   Temp 98.1 °F (36.7 °C) (Axillary)   Resp 24   Ht 4' 10\" (1.473 m)   Wt 179 lb 3.2 oz (81.3 kg)   SpO2 92%   BMI 37.45 kg/m²    Physical Exam  Constitutional:       General: She is in acute distress. Appearance: She is well-developed. She is not diaphoretic. HENT:      Head: Normocephalic and atraumatic. Mouth/Throat:      Pharynx: No oropharyngeal exudate. Eyes:      Pupils: Pupils are equal, round, and reactive to light. Neck:      Musculoskeletal: Neck supple. Vascular: No JVD. Cardiovascular:      Heart sounds: Normal heart sounds. No murmur. No friction rub. No gallop. Pulmonary:      Effort: Respiratory distress present. Breath sounds: No wheezing or rales. Abdominal:      General: Bowel sounds are normal. There is no distension. Palpations: Abdomen is soft. Tenderness: There is no abdominal tenderness. Musculoskeletal: Normal range of motion. Lymphadenopathy:      Cervical: No cervical adenopathy. Skin:     General: Skin is warm and dry. Findings: No rash. Neurological:      Mental Status: She is alert. She is disoriented. Cranial Nerves: No cranial nerve deficit.       Comments: CN 2-12 grossly intact            Data Reviewed:   LABS:  CBC:   Recent Labs     11/18/20  0350 11/19/20  0219 11/20/20  0524   WBC 14.4* 10.0 8.5   HGB 14.7 14.2 13.5   HCT 46.4 43.0 40.9   MCV 97.3 94.5 95.0    203 220     BMP:   Recent Labs 11/18/20  0350  11/19/20  0219 11/19/20  0807 11/19/20  1357 11/20/20  0524   *   < > 153* 154* 153* 152*   K 3.9  3.9  --  3.1*  3.1*  --   --  3.4*  3.4*   *  --  118*  --   --  117*   CO2 15*  --  23  --   --  24   PHOS 5.3*  --  3.8  --   --  3.9   *  --  124*  --   --  103*   CREATININE 4.4*  --  3.2*  --   --  2.2*    < > = values in this interval not displayed. LIVER PROFILE: No results for input(s): AST, ALT, LIPASE, AMYLASE, BILIDIR, BILITOT, ALKPHOS in the last 72 hours. Invalid input(s):  ALB  PT/INR: No results for input(s): PROTIME, INR in the last 72 hours. APTT:  Recent Labs     11/19/20  1014 11/19/20  1855 11/20/20  0135   APTT 113.7* 68.0* 58.3*     UA:No results for input(s): NITRITE, COLORU, PHUR, LABCAST, WBCUA, RBCUA, MUCUS, TRICHOMONAS, YEAST, BACTERIA, CLARITYU, SPECGRAV, LEUKOCYTESUR, UROBILINOGEN, BILIRUBINUR, BLOODU, GLUCOSEU, AMORPHOUS in the last 72 hours. Invalid input(s): KETONESU  Recent Labs     11/19/20  0228   PHART 7.389   AEK4CVJ 38.9   PO2ART 80.2       Vent Information  Skin Assessment: Clean, dry, & intact  FiO2 : 100 %  SpO2: 92 %  SpO2/FiO2 ratio: 92    CXR personally reviewed, reduced lung volumes but no acute process          Assessment:     1. Acute hypoxic resp failure, unclear etiology  2. PFO and associated shunting  3. Acute encephalopathy, metabolic  4. UTI with sepsis  5. SHANIA    Plan:      -Titrate vapotherm support, since her saturations continue to drop will transfer to the ICU in the event she requires vent support  -Difficult to discern etiology of her acute worsening, could be IVF in the setting of her renal issues or possibly COVID.  Will check PCR and keep in isolation pending results  -if COVID is negative will obtain CT chest wo contrast to evaluate lung parenchyma in more detail   -Has evidence of shunting from a possible PFO on echo, although this is contributing it would be the unlikely source of her acute decompensation and increased oxygen requirements. Eventual JO may be useful but hold off for now until more stable  -Atb for UTI  -Nephro following, serial labs. May need RRT  -Guarded prognosis, family wish to continue full support     Due to life threatening resp failure this patient is critically ill.  Total critical care time involved in her care was 35 mins    Sky Lopez MD

## 2020-11-20 NOTE — PROGRESS NOTES
Nephrology Progress Note   [email or web address]      This patient is a 79year old female whom we are following for SHANIA. Subjective: The patient was seen and examined. Still poorly responsive. Hypoxic. Non-oliguric. Family History: Family at bedside and updated. ROS: Unable to obtain      Vitals:  /85   Pulse 103   Temp 98.1 °F (36.7 °C) (Axillary)   Resp 24   Ht 4' 10\" (1.473 m)   Wt 179 lb 3.2 oz (81.3 kg)   SpO2 92%   BMI 37.45 kg/m²   I/O last 3 completed shifts: In: 0   Out: 3489 [Urine:1125]  I/O this shift:  In: -   Out: 550 [Urine:550]    Physical Exam:  Physical Exam  Vitals signs reviewed. Constitutional:       General: She is not in acute distress. HENT:      Head: Normocephalic and atraumatic. Mouth/Throat:      Mouth: Mucous membranes are dry. Eyes:      General: No scleral icterus. Conjunctiva/sclera: Conjunctivae normal.   Cardiovascular:      Rate and Rhythm: Tachycardia present. Heart sounds: No friction rub. Pulmonary:      Effort: Pulmonary effort is normal. No respiratory distress. Breath sounds: Rales present. No wheezing. Abdominal:      General: Bowel sounds are normal. There is no distension. Tenderness: There is no abdominal tenderness. Musculoskeletal:      Right lower leg: No edema. Left lower leg: No edema. Neurological:      Mental Status: She is lethargic.            Medications:   atorvastatin  40 mg Oral Nightly    metoprolol  2.5 mg Intravenous BID    collagenase   Topical Daily    sodium chloride flush  10 mL Intravenous 2 times per day    cefTRIAXone (ROCEPHIN) IV  1 g Intravenous Q24H    azithromycin  500 mg Intravenous Q24H         Labs:  Recent Labs     11/18/20  0350 11/19/20  0219 11/20/20  0524   WBC 14.4* 10.0 8.5   HGB 14.7 14.2 13.5   HCT 46.4 43.0 40.9   MCV 97.3 94.5 95.0    203 220     Recent Labs     11/18/20  0350  11/19/20  0219 11/19/20  0807 11/19/20  1357 11/20/20  0524   *   < > 153* 154* 153* 152*   K 3.9  3.9  --  3.1*  3.1*  --   --  3.4*  3.4*   *  --  118*  --   --  117*   CO2 15*  --  23  --   --  24   GLUCOSE 90  --  121*  --   --  130*   PHOS 5.3*  --  3.8  --   --  3.9   MG  --   --  2.40  --   --  2.50*   *  --  124*  --   --  103*   CREATININE 4.4*  --  3.2*  --   --  2.2*   LABGLOM 10*  --  14*  --   --  21*   GFRAA 12*  --  17*  --   --  26*    < > = values in this interval not displayed. Assessment/Plan:    Acute Kidney Injury.  - Likely from dehydration +/- ATN. - Urinalysis with trace of protein and blood, +LE, bacteria suggestive of UTI.  - CT of A/P did not show any hydronephrosis. - Kidney function is slowly improving, non-oliguric. Continue with gentle IVF hydration.  - Please avoid any nephrotoxic agents such as NSAIDs or IV contrast unless deemed necessary.  - Renal function panel daily.     Hypernatremia.  - From decreased PO fluid intake/dehydration. Not polyuric.  - Increase hypotonic IVF rate to 75cc/hr. Hypokalemia. - PRN supplementation.     Metabolic Acidosis. - From lactic acid and SHANIA. - Improved with bicarb drip.     Sepsis. - UTI vs pneumonia.  - COVID-19 test negative. - On empiric antibiotics pending culture results. - Leukocytosis improving.     AMS. - Likely from metabolic encephalopathy. CT of the head with no acute intracranial abnormality.  - Further work-up per per primary service. Thank you for allowing me to participate in the care of this patient. Please do not hesitate to contact me at [phone] if with questions. Please do not hesitate to contact me at [phone] if with questions. Thank you!     Gerry Pugh MD  11/20/2020  [facility]

## 2020-11-20 NOTE — CONSULTS
[facility]  [phone]      Attending Physician: Jacob Chester MD  Reason for Consultation/Chief Complaint: Cough, lethargy, change in mental status    Subjective   History of Present Illness:  Ankita Espinal is a 79 y.o. patient who presented to the hospital with complaints of Cough, lethargy, change in mental status, patient is being ruled out for Covid, direct history cannot be obtained as she is in Covid isolation. Also due to confusion, history cannot be obtained for the patient. History is per chart review. Patient presented to the hospital November 17, 2020 with these complaints. Per staff and per notes, she was noted to have poor hygiene, there was feces noted on her face. In course evaluation, troponin level was found to be elevated 0.16, proBNP level was 8965, she was noted to have acute kidney injury with BUN/creatinine of 181/5.8. Renal function is improving with hydration. Patient was admitted with possible sepsis due to UTI. In course of evaluation, patient had an echocardiogram which showed normal left ventricular function, a PFO/ASD was detected by bubble study. In the course this hospitalization, patient's respiratory status has deteriorated over the last 2 days, she is requiring higher flows of oxygen, she had been scheduled for a sacral wound debridement with general surgery however this has been canceled due to worsening respiratory status. Pulmonary has been consulted. Covid is being ruled out. Patient has a cardiac history which dates back to 2007, had a dual-chamber pacemaker placed that time he had a generator change in 2016, she had been following with EP, Dr. Maxene Carrel, in our office and was last seen in the office in December 2019. She was felt to be stable regard to a history of sinus node dysfunction, paroxysmal atrial fibrillation, hypertension, orthostatic hypotension, nonobstructive CAD (see on prescribed medical therapy). Past Medical History:   has a past medical history of Arthritis, CAD (coronary artery disease), History of blood transfusion, Hypertension, Orthostatic hypotension, and Pacemaker. Surgical History:   has a past surgical history that includes joint replacement; Cardiac surgery; Hysterectomy; Upper gastrointestinal endoscopy; pr egd flex removal lesion(s) by hot biopsy forceps (11/12/2018); and Upper gastrointestinal endoscopy (N/A, 11/12/2018). Social History:   reports that she has never smoked. She has never used smokeless tobacco. She reports that she does not drink alcohol or use drugs. Family History:  Unable to obtain, patient has altered mental status      Home Medications:  Were reviewed and are listed in nursing record and/or below  Prior to Admission medications    Medication Sig Start Date End Date Taking? Authorizing Provider   metoprolol succinate (TOPROL XL) 25 MG extended release tablet Take 1 tablet by mouth daily 10/9/20   LAVELLE Montana CNP   omeprazole (PRILOSEC) 20 MG delayed release capsule TAKE 1 CAPSULE DAILY 7/27/20   Thiago Aranda MD   gabapentin (NEURONTIN) 300 MG capsule Take 1 capsule by mouth 3 times daily for 5 days. 12/14/19 12/19/19  Alon Lauren MD   bisacodyl (BISACODYL) 5 MG EC tablet Take 4 tablets of Bisacodyl for colonoscopy prep as directed.  12/12/19   Victoria Kirby MD   meclizine (ANTIVERT) 25 MG tablet Take 1 tablet by mouth daily Patient is only taking 1 daily 8/15/19   Gwendolyn Wan MD   Elastic Bandages & Supports (JOBST KNEE HIGH COMPRESSION SM) MISC 1 each by Does not apply route daily 7/18/19   LAVELLE Jha CNP   potassium chloride (MICRO-K) 10 MEQ extended release capsule Take 10 mEq by mouth every other day    Historical Provider, MD   rOPINIRole (REQUIP) 0.5 MG tablet Take 0.5 mg by mouth nightly    Historical Provider, MD   sertraline (ZOLOFT) 100 MG tablet Take 100 mg by mouth daily    Historical Provider, MD Component Value Date    CKTOTAL 246 (H) 11/17/2020    TROPONINI 0.16 (H) 11/19/2020         Cardiac Data     Last EKG: Sinus tachycardia, left axis deviation, poor R wave progression, possible inferior infarct, overall similar to prior EKG however in prior EKG atrial pacing was noted    Echo:    Today:    Technically difficult examination due to patient immobility. No parasternal   or subcostal windows available. Limited study to rule out RWMA. LV systolic function is vigorous with a visually estimated EF of 60-65%. No obvious segmental wall motion abnormalities. The left ventricle is small in size with moderate concentric hypertrophy. Type I diastolic dysfunction with normal LV filling pressure. The right atrium is mildly enlarged. Lipomatous hypertrophy of interatial septum with marked atrial septal   aneurysm. A bubble study was performed with complete opacification of left sided   chambers with couple beats c/w possible significant ASD or PFO. Recommend   JO for better evaluation. Stress Test:    Cath:    Studies:     cxr    Impression    Left-sided dual lead pacemaker/ICD seen in place.         Ill-defined left lower lobe opacity seen.  No pneumothorax.  Recommend    comparison with clinical exam and follow-up films.               I have reviewed labs and imaging/xray/diagnostic testing in this note.     Assessment and Plan          Patient Active Problem List   Diagnosis    Orthostatic hypotension    Cardiac pacemaker in situ    Sinus node dysfunction (HCC)    Essential hypertension    Restless legs    Gastroesophageal reflux disease without esophagitis    Mild episode of recurrent major depressive disorder (HCC)    Nerve pain    Paroxysmal atrial fibrillation (HCC)    Rectal bleeding    Shortness of breath    Weakness generalized    CAD (coronary artery disease)    Chest pain    Generalized weakness    Sepsis due to urinary tract infection (HCC)       Abnormal echo with PFO/ASD, this is a congenital abnormality and does not appear to be contributing to acute issues, this can be followed up as an outpatient. Would consider a JO as an outpatient but would not do so as an inpatient. We will start baby aspirin for this. Status post pacemaker, can continue to follow-up on this as an outpatient    Hypercholesterolemia, continue statin    Hypertension, continue beta-blocker    Acute respiratory failure, possible Covid infection, as per primary service    Sepsis, as per primary service    No further inpatient cardiac work-up or treatment is planned. We will sign off, please call with questions    Thank you for allowing us to participate in the care of Emerson Obando. Please call me with any questions [phone].     Zoey York MD, [facility] [place]   Interventional Cardiologist  [address]  [phone] [facility]  [phone] [address] [place] [address]  11/20/2020 2:55 PM

## 2020-11-20 NOTE — PROGRESS NOTES
11/20/20 0811   Oxygen Therapy/Pulse Ox   O2 Therapy Oxygen humidified   $Oxygen $Daily Charge   O2 Device Heated high flow cannula   O2 Flow Rate (L/min) 40 L/min   FiO2  100 %   SpO2 93 %   Skin Protection for O2 Device Yes   Pulse Oximeter Device Mode Intermittent   $Pulse Oximeter $Spot check (multiple/continuous)

## 2020-11-20 NOTE — PROGRESS NOTES
Patient oxygen demands increased from 10L to 15L, patient oxygen saturation was still mid 80s after increasing flow rate, started on vapotherm per Clara Corteslakshmi MONROY.

## 2020-11-20 NOTE — PROGRESS NOTES
Hospitalist Progress Note      PCP: Silvia Walker MD    Date of Admission: 11/17/2020    Chief Complaint: Fatigue and cough    Hospital Course: Reviewed H&P    Subjective: Reviewed overnight events -patient had acute hypoxic respiratory failure associated with tachycardia concerning for PE and started on empiric heparin drip. Noted improvement in kidney functions.   -Patient continues to be obtunded. Seen with son at bedside and discussed plan of care     Medications:  Reviewed    Infusion Medications    heparin (PORCINE) Infusion 6.9 mL/hr (11/19/20 1320)    dextrose 50 mL/hr at 11/19/20 1635     Scheduled Medications    collagenase   Topical Daily    sodium chloride flush  10 mL Intravenous 2 times per day    cefTRIAXone (ROCEPHIN) IV  1 g Intravenous Q24H    azithromycin  500 mg Intravenous Q24H     PRN Meds: heparin (porcine), heparin (porcine), sodium chloride flush, acetaminophen **OR** acetaminophen, polyethylene glycol, promethazine **OR** ondansetron      Intake/Output Summary (Last 24 hours) at 11/19/2020 2025  Last data filed at 11/19/2020 1354  Gross per 24 hour   Intake 1518 ml   Output 1050 ml   Net 468 ml       Physical Exam Performed:    BP (!) 162/75   Pulse 103   Temp 98.1 °F (36.7 °C) (Oral)   Resp 27   Ht 4' 10\" (1.473 m)   Wt 181 lb 9.6 oz (82.4 kg)   SpO2 93%   BMI 37.95 kg/m²     General appearance:  No apparent distress, patient unkempt and disheveled in appearance with a low thick oral secretions noted on her face. HEENT:  Normal cephalic, atraumatic without obvious deformity. Pupils equal, round, and reactive to light. Extra ocular muscles intact. Conjunctivae/corneas clear. Neck: Supple, with full range of motion. No jugular venous distention. Trachea midline. Respiratory:  Normal respiratory effort. Clear to auscultation, bilaterally without Rales/Wheezes/Rhonchi.   Diminished breath sounds at bases  Cardiovascular:  Regular rate and rhythm with normal S1/S2 without murmurs, rubs or gallops. Abdomen: Soft, non-tender, non-distended with normal bowel sounds. Musculoskeletal:  No clubbing, cyanosis or edema bilaterally. Full range of motion without deformity. Skin: Skin color, texture, turgor normal.  No rashes or lesions. Neurologic: Lethargic and obtunded  Psychiatric: Lethargic and obtunded  Capillary Refill: Brisk,< 3 seconds   Peripheral Pulses: +2 palpable, equal bilaterally       Labs:   Recent Labs     11/17/20  0947 11/18/20  0350 11/19/20  0219   WBC 18.8* 14.4* 10.0   HGB 17.3* 14.7 14.2   HCT 54.0* 46.4 43.0    195 203     Recent Labs     11/17/20  1751 11/18/20  0350  11/19/20  0219 11/19/20  0807 11/19/20  1357   * 155*   < > 153* 154* 153*   K 4.0 3.9  3.9  --  3.1*  3.1*  --   --    * 123*  --  118*  --   --    CO2 21 15*  --  23  --   --    * 155*  --  124*  --   --    CREATININE 5.0* 4.4*  --  3.2*  --   --    CALCIUM 8.8 9.1  --  9.1  --   --    PHOS 5.6* 5.3*  --  3.8  --   --     < > = values in this interval not displayed. Recent Labs     11/17/20  0947   AST 23   ALT 10   BILITOT 0.6   ALKPHOS 85     No results for input(s): INR in the last 72 hours. Recent Labs     11/17/20  0947 11/17/20  1751 11/17/20 2127 11/19/20  0344   CKTOTAL 246*  --   --   --    TROPONINI 0.12* 0.11* 0.11* 0.16*       Urinalysis:      Lab Results   Component Value Date    NITRU Negative 11/17/2020    WBCUA >100 11/17/2020    BACTERIA 3+ 11/17/2020    RBCUA 5-10 11/17/2020    BLOODU TRACE-INTACT 11/17/2020    SPECGRAV 1.025 11/17/2020    GLUCOSEU Negative 11/17/2020       Radiology:  NM LUNG VENT/PERFUSION (VQ)   Final Result   Low probability for pulmonary embolus. VL Extremity Venous Bilateral   Final Result      XR CHEST PORTABLE   Final Result   Bibasilar airspace disease not significantly changed. CT ABDOMEN PELVIS WO CONTRAST   Final Result   No acute inflammatory process or bowel obstruction.       Negative nephrolithiasis or obstructive uropathy. CT HEAD WO CONTRAST   Final Result   No acute intracranial abnormality. CT CERVICAL SPINE WO CONTRAST   Final Result   . No acute abnormality of the cervical spine. Moderate cervical spondylosis. Moderate to severe facet arthropathy. Prior fusion of C5 and C6. No   significant change from the prior study. XR WRIST LEFT (MIN 3 VIEWS)   Final Result   No acute fracture. Mild soft tissue swelling. Degenerative changes. XR CHEST PORTABLE   Final Result   Interval development increase interstitial markings bilaterally when compared   to October 5, 2020. Patchy appearance infiltrate specially the left raising   the possibly of infiltrates                 Assessment/Plan:    Active Hospital Problems    Diagnosis Date Noted    Sepsis due to urinary tract infection ([facility] [address] [A41.9, N39.0] 11/17/2020     1. Sepsis due to urinary tract infection POA  -As evidenced by leukocytosis, lactic acidosis, abnormal urine analysis. -Ordered 30 mils per KG fluid bolus in ED; repeat lactic; continue IV hydration.  -Empirically started on Rocephin and azithromycin ED; continue pending cultures.     2. Acute kidney injury -BUN/creatinine-181/5.8 on admission -improving  -Likely due to severe dehydration in addition to sepsis POA  -CT abdomen pelvis negative for nephrolithiasis and obstructive uropathy.  -Continue aggressive IV hydration and anticipate improvement. Strict I's/O monitoring. Monitor BMP. -Nephrology consulted from ED -evaluated with plans to continue IV hydration at this time and monitor her progress.     3. Acute hypernatremia -sodium 149 on admission consistent with dehydration.  -Continue IV hydration and monitor BMP.   Anticipate improvement.     4.  Acute metabolic encephalopathy -likely due to above  -CT head negative for acute intracranial abnormality and CT C-spine negative for fracture.  -Continue neurochecks and supportive care.     5. Hypertension -blood pressure normal on admission. Monitor off antihypertensives for the first 24 hours given sepsis and will resume home medications as able     6. History of pacemaker -stable     7.  Elevated troponin POA -likely in the setting of SHANIA. Troponin 0.12 on admission. Continue to trend. 8.  Acute right isolated distal DVT of the peroneal vein -as noted on bilateral venous duplex on 11/19/2020. VQ scan low probability for PE. Continue heparin GTT       DVT Prophylaxis: Heparin GTT  Diet: Diet NPO Effective Now  Code Status: Full Code    PT/OT Eval Status: Not yet ordered    Dispo -at least 3 to 5 days pending clinical improvement       The note was completed using Dragon -speech recognition software & EMR  . Every effort was made to ensure accuracy; however, inadvertent computerized transcription errors may be present.     Lilly Turpin MD

## 2020-11-20 NOTE — PROGRESS NOTES
Given patient's pulmonary status and recent discussion between Anesthesiologist and POA will cancel plans for sacral wound debridement today.     Jethro Aguilar MD

## 2020-11-20 NOTE — FLOWSHEET NOTE
11/20/20 1323   Encounter Summary   Services provided to: Patient not available  (Patient not responsive)   Continue Visiting   (11/20: follow up on AD convo, no family present)   Spiritual/Yarsani   Type Spiritual support  (Silent prayer for patient, family)

## 2020-11-21 NOTE — PROGRESS NOTES
Hospitalist Progress Note      PCP: Carolyn Bose MD    Date of Admission: 11/17/2020    Chief Complaint: Fatigue and cough    Hospital Course: Reviewed H&P    Subjective: Patient continues to be obtunded w/o improvement in mentation. S/b pulmonology for worsening respiratory status and requested COVID PCR, which resulted Positive. Tried reaching daughter Mai Astudillo ) [facility]. POA to discuss about this but could not reach her. Patient currently on BiPAP. Her condition continued to be critical with guarded prognosis at the best.  Reviewed cardiology evaluation regarding abnormal echo with PFO/ASD -recommended no further work-up at this time.   -Had goals of care conversation with  patient's daughter Mai Astudillo )regarding patient's condition and requested a copy of living will -which showed patient would not want aggressive measures, hence CODE STATUS amended to DNR CCA. Daughter Marc Escalante agreeable.  -Noted renal functions improving.   After discussion with critical care, requested neurology consultation to assess ongoing encephalopathy-pending     Medications:  Reviewed    Infusion Medications    IV infusion builder 100 mL/hr at 11/21/20 1107     Scheduled Medications    atorvastatin  40 mg Oral Nightly    metoprolol  2.5 mg Intravenous BID    collagenase   Topical Daily    sodium chloride flush  10 mL Intravenous 2 times per day    cefTRIAXone (ROCEPHIN) IV  1 g Intravenous Q24H    azithromycin  500 mg Intravenous Q24H     PRN Meds: perflutren lipid microspheres, sodium chloride flush, acetaminophen **OR** acetaminophen, polyethylene glycol, promethazine **OR** ondansetron      Intake/Output Summary (Last 24 hours) at 11/21/2020 1157  Last data filed at 11/21/2020 1004  Gross per 24 hour   Intake 1087 ml   Output 1000 ml   Net 87 ml       Physical Exam Performed:  /78   Pulse 91   Temp 97.9 °F (36.6 °C) (Axillary)   Resp 26   Ht 4' 10\" (1.473 m)   Wt 173 lb 6.4 oz (78.7 kg)   SpO2 95%   BMI 36.24 kg/m²     I performed an audiovisual assessment, based on new provisions and guidance offered by [facility] on March 18, 2020 in setting of COVID-19 outbreak and in order to preserve personal protective equipment in accordance with the flexibilities announced by CMS on March 30, 2020. References:   [email or web address]/Resources/COVID-19/3_18%20Telemed%20Guidance%20Updated%20March%2018. pdf?[email or web address]   http://[email or web address] pdf     Bedside physical examination deferred. However, I did visually inspect the patient and observed the following:     General appearance: On BiPAP at this time  Neck: Deferred. Respiratory: Increased respiratory effort. Cardiovascular: Deferred. Abdomen: Deferred. Musculoskelatal: Deferred. Neurologic:  Deferred. Psychiatric: Deferred  Skin: Deferred. Labs:   Recent Labs     11/19/20  0219 11/20/20  0524 11/21/20  0530   WBC 10.0 8.5 10.8   HGB 14.2 13.5 12.0   HCT 43.0 40.9 37.4    220 187     Recent Labs     11/19/20  0219  11/19/20  1357 11/20/20  0524 11/21/20  0530   *   < > 153* 152* 151*   K 3.1*  3.1*  --   --  3.4*  3.4* 3.4*  3.4*   *  --   --  117* 117*   CO2 23  --   --  24 25   *  --   --  103* 89*   CREATININE 3.2*  --   --  2.2* 2.1*   CALCIUM 9.1  --   --  9.5 8.9   PHOS 3.8  --   --  3.9 3.6    < > = values in this interval not displayed. Recent Labs     11/19/20  0344   TROPONINI 0.16*       Urinalysis:    Lab Results   Component Value Date    NITRU Negative 11/17/2020    WBCUA >100 11/17/2020    BACTERIA 3+ 11/17/2020    RBCUA 5-10 11/17/2020    BLOODU TRACE-INTACT 11/17/2020    SPECGRAV 1.025 11/17/2020    GLUCOSEU Negative 11/17/2020       Radiology:  XR CHEST PORTABLE   Final Result   Left-sided dual lead pacemaker/ICD seen in place. Ill-defined left lower lobe opacity seen. No pneumothorax.   Recommend   comparison with clinical exam and follow-up films. NM LUNG VENT/PERFUSION (VQ)   Final Result   Low probability for pulmonary embolus. VL Extremity Venous Bilateral   Final Result      XR CHEST PORTABLE   Final Result   Bibasilar airspace disease not significantly changed. CT ABDOMEN PELVIS WO CONTRAST   Final Result   No acute inflammatory process or bowel obstruction. Negative nephrolithiasis or obstructive uropathy. CT HEAD WO CONTRAST   Final Result   No acute intracranial abnormality. CT CERVICAL SPINE WO CONTRAST   Final Result   . No acute abnormality of the cervical spine. Moderate cervical spondylosis. Moderate to severe facet arthropathy. Prior fusion of C5 and C6. No   significant change from the prior study. XR WRIST LEFT (MIN 3 VIEWS)   Final Result   No acute fracture. Mild soft tissue swelling. Degenerative changes. XR CHEST PORTABLE   Final Result   Interval development increase interstitial markings bilaterally when compared   to October 5, 2020. Patchy appearance infiltrate specially the left raising   the possibly of infiltrates             Active Hospital Problems    Diagnosis Date Noted    Sepsis due to urinary tract infection ([facility] [address] [A41.9, N39.0] 11/17/2020     Assessment/Plan:  1. Sepsis due to Klebsiella urinary tract infection POA -clinically sepsis syndrome resolved  -As evidenced by leukocytosis, lactic acidosis, abnormal urine analysis. -Ordered 30 mils per KG fluid bolus in ED; repeat lactic; continue IV hydration.  -Empirically started on Rocephin and azithromycin ED; urine cultures showed 75K Klebsiella sensitive to Rocephin-continue     2. Acute kidney injury -BUN/creatinine-181/5.8 on admission -improving  -Likely due to severe dehydration in addition to sepsis POA  -CT abdomen pelvis negative for nephrolithiasis and obstructive uropathy.  -Continue aggressive IV hydration and anticipate improvement. Strict I's/O monitoring. intubation. Anesthesia agreeable. Plan to continue conservative management at this time. Will follow     DVT Prophylaxis: Heparin  SQ  Diet: Diet NPO Effective Now  Code Status: DNR-CCA    PT/OT Eval Status: Not yet ordered    Dispo -patient's condition remains critical and her prognosis is guarded. Discussed with daughter/HC QUYNH Copeland face-to-face for more than 30 minutes on 11/20/2020. CODE STATUS amended to DNR CCA. We will continue ongoing EOL discussions and consider patient for palliative care evaluation. The note was completed using Dragon -speech recognition software & EMR  . Every effort was made to ensure accuracy; however, inadvertent computerized transcription errors may be present.     Loman Habermann, MD

## 2020-11-21 NOTE — PROGRESS NOTES
/79   Pulse 91   Temp 98.5 °F (36.9 °C) (Axillary)   Resp 24   Ht 4' 10\" (1.473 m)   Wt 173 lb 6.4 oz (78.7 kg)   SpO2 95%   BMI 36.24 kg/m²  on bipap. Pt responds to pain at this time, turning and repositioning otherwise pt does not appear to be in pain. Tachypnea. Lungs diminished. SB/NSR on tele. Pt turned and repositioned with wedge. BLE elevated off bed onto pillows with heels floated off pillows; heel mepilex's in place. Oral care provided, pt is resistant at times. Bed alarm in place and activated. Will continue to monitor.   Elizabeth Pena  11/21/2020

## 2020-11-21 NOTE — PROGRESS NOTES
/73   Pulse 63   Temp 97.2 °F (36.2 °C) (Axillary)   Resp 21   Ht 4' 10\" (1.473 m)   Wt 173 lb 6.4 oz (78.7 kg)   SpO2 96%   BMI 36.24 kg/m²  on bipap. Pt briefly slightly opened eyes to voice/name. Pt only appears to be in pain with turning and repositioning. Pt being turned and repositioned every 2 hours. Oral care being provided every 2 hours. Warm compresses to bilateral eyes for drainage. SB/NSR on tele. BLE remain elevated off bed onto pillow with heels floated off pillow; mepilex's remain in place. Wound to pt's L inner buttocks cleansed with santyl applied, with moist to dry dressing and medipore per order.   Tabithanda Sheets  11/21/2020

## 2020-11-21 NOTE — CONSULTS
Remdesivir Initiation Note    Gemini Demarco meets criteria for initiation of remdesivir under the emergency use authorization (EUA) based on the following:   Known or suspected COVID-19   Severe disease (SpO2 ? 94% on RA, requiring supplemental O2, or requiring invasive mechanical ventilation)   Acceptable renal function  o CrCl ? 30 ml/min based on SCr obtained prior to initiation OR   o CrCl < 30 ml/min but the potential benefit of remdesivir outweighs the risk   Acceptable hepatic function (ALT within 5 times ULN)      Liver function tests will be monitored daily while on remdesivir.     Remdesivir 200 mg loading dose followed by 100 mg Q 24 hours x 4 doses  [facility] 11/21/2020 2:46 PM

## 2020-11-21 NOTE — CONSULTS
Infectious Diseases   Consult Note      Reason for Consult:  COVID-19 and acute hypoxemic respiratory failure    Requesting Physician:  Dr. Santa Sheffield       Date of Admission: 11/17/2020  Subjective:   CHIEF COMPLAINT:  Unable to provide       HPI:   Irma Hernandez is a 75yoF with history of CAD, HTN, PPM in place. ED 11/17/20 - cough, altered mental status. WBC was elevated at 18. SHANIA noted. CXR with patchy rodo infiltrates  COVID NAAT negative   Admitted with working diagnosis sepsis from UTI. Worsening hypoxemic respiratory failure following admission. Repeat COVID test sent   Started on solumedrol today    Also found to have acute distal RLE DVT    Was seen by  re sacral pressure wound. No plans at this point for debridement. SHANIA improving     She remains afebrile. On Bipap 100%   Patient cannot t contribute to the history. Current abx:  Azithromycin 500 q24 s 11/18/20   Rocephin 1g q24 s 11/18/20     Solumedrol 40 q12       Past Surgical History:       Diagnosis Date    Arthritis     CAD (coronary artery disease)     pacemaker/defib    COVID-19 11/20/2020    History of blood transfusion     Hypertension     Orthostatic hypotension     Pacemaker          Procedure Laterality Date    CARDIAC SURGERY      pacemaker, defib    HYSTERECTOMY      JOINT REPLACEMENT      right and left    [place] EGD FLEX REMOVAL LESION(S) BY HOT BIOPSY FORCEPS  11/12/2018    ESOPHAGEAL DILATION Amee Irving #54 performed by Yamil Oneil DO at [facility] ENDOSCOPY      erosive gastritis     UPPER GASTROINTESTINAL ENDOSCOPY N/A 11/12/2018    EGD BIOPSY performed by Yamil Oneil DO at [address] [place] [address] History:    TOBACCO:   reports that she has never smoked. She has never used smokeless tobacco.  ETOH:   reports no history of alcohol use. There is no history of illicit drug use or other significant epidemiologic exposures.   Was living with one of the adult daughters PTA       Family History:   History reviewed. No pertinent family history. There is no family history of autoimmune diseases or significant infectious diseases.       Current Medications:    Current Facility-Administered Medications: potassium chloride 20 mEq in dextrose 5 % and 0.2 % NaCl 1,000 mL infusion, , Intravenous, Continuous  methylPREDNISolone sodium (SOLU-MEDROL) injection 40 mg, 40 mg, Intravenous, Q12H  perflutren lipid microspheres (DEFINITY) injection 1.65 mg, 1.5 mL, Intravenous, ONCE PRN  atorvastatin (LIPITOR) tablet 40 mg, 40 mg, Oral, Nightly  metoprolol (LOPRESSOR) injection 2.5 mg, 2.5 mg, Intravenous, BID  collagenase ointment, , Topical, Daily  sodium chloride flush 0.9 % injection 10 mL, 10 mL, Intravenous, 2 times per day  sodium chloride flush 0.9 % injection 10 mL, 10 mL, Intravenous, PRN  cefTRIAXone (ROCEPHIN) 1 g IVPB in 50 mL D5W minibag, 1 g, Intravenous, Q24H  azithromycin (ZITHROMAX) 500 mg in D5W 250ml addavial, 500 mg, Intravenous, Q24H  acetaminophen (TYLENOL) tablet 650 mg, 650 mg, Oral, Q6H PRN **OR** acetaminophen (TYLENOL) suppository 650 mg, 650 mg, Rectal, Q6H PRN  polyethylene glycol (GLYCOLAX) packet 17 g, 17 g, Oral, Daily PRN  promethazine (PHENERGAN) tablet 12.5 mg, 12.5 mg, Oral, Q6H PRN **OR** ondansetron (ZOFRAN) injection 4 mg, 4 mg, Intravenous, Q6H PRN      No Known Allergies       REVIEW OF SYSTEMS:    Unable to obtain      Objective:   PHYSICAL EXAM:      VITALS:  /78   Pulse 72   Temp 97.9 °F (36.6 °C) (Axillary)   Resp 28   Ht 4' 10\" (1.473 m)   Wt 173 lb 6.4 oz (78.7 kg)   SpO2 95%   BMI 36.24 kg/m²      24HR INTAKE/OUTPUT:      Intake/Output Summary (Last 24 hours) at 11/21/2020 1247  Last data filed at 11/21/2020 1004  Gross per 24 hour   Intake 1087 ml   Output 1000 ml   Net 87 ml       Due to the current efforts to prevent transmission of COVID-19 and also the need to preserve PPE for other caregivers, a face-to-face encounter with the patient was not performed. That being said, all relevant records and diagnostic tests were reviewed, including laboratory results and imaging. Please reference any relevant documentation elsewhere. Care will be coordinated with the primary service. On Bipap with increased WOB  Photo of sacral pressure ulcer reviewed under media tab      DATA:    Old records have been reviewed    CBC:  Recent Labs     11/19/20 0219 11/20/20  0524 11/21/20  0530   WBC 10.0 8.5 10.8   RBC 4.55 4.30 3.82*   HGB 14.2 13.5 12.0   HCT 43.0 40.9 37.4    220 187   MCV 94.5 95.0 97.9   MCH 31.1 31.3 31.5   MCHC 32.9 33.0 32.2   RDW 14.6 14.3 14.8   BANDSPCT  --  5 5      BMP:  Recent Labs     11/19/20 0219 11/19/20  1357 11/20/20  0524 11/21/20  0530   *   < > 153* 152* 151*   K 3.1*  3.1*  --   --  3.4*  3.4* 3.4*  3.4*   *  --   --  117* 117*   CO2 23  --   --  24 25   *  --   --  103* 89*   CREATININE 3.2*  --   --  2.2* 2.1*   CALCIUM 9.1  --   --  9.5 8.9   GLUCOSE 121*  --   --  130* 129*    < > = values in this interval not displayed. 11/17 11/19   CK   246  CRP      ddimer   1016      Cultures:   11/17 BC x2 neg   Flu ag neg    COVID NAAT neg g  11/18 UC 75k Klebsiella   Klebsiella pneumoniae   Antibiotic  Interpretation  NOMAN  Status     ampicillin  Resistant  >=32  mcg/mL      ceFAZolin  Sensitive  <=4  mcg/mL       NOTE: Cefazolin should only be used for uncomplicated UTI         for E.coli or Klebsiella pneumoniae.     cefepime  Sensitive  <=0.12  mcg/mL      cefTRIAXone  Sensitive  <=0.25  mcg/mL      ciprofloxacin  Sensitive  <=0.25  mcg/mL      ertapenem  Sensitive  <=0.12  mcg/mL      gentamicin  Sensitive  <=1  mcg/mL      levofloxacin  Sensitive  <=0.12  mcg/mL      nitrofurantoin  Resistant  128  mcg/mL      piperacillin-tazobactam  Sensitive  <=4  mcg/mL      trimethoprim-sulfamethoxazole  Sensitive  <=20  mcg/mL       11/20 COVID RT PCR+      Radiology Review:  All pertinent images / reports were reviewed as a part of this visit. CXR 11/20/20         Assessment:     Patient Active Problem List   Diagnosis    Orthostatic hypotension    Cardiac pacemaker in situ    Sinus node dysfunction (HCC)    Essential hypertension    Restless legs    Gastroesophageal reflux disease without esophagitis    Mild episode of recurrent major depressive disorder (HCC)    Nerve pain    Paroxysmal atrial fibrillation (HCC)    Rectal bleeding    Shortness of breath    Weakness generalized    CAD (coronary artery disease)    Chest pain    Generalized weakness    Sepsis due to urinary tract infection ([facility] [address]       Admitted 11/17/20 with acute hypoxemic respiratory failure, encephalopathy    COVID-19   NAAT was negative on admission 11/17/20, RT PCT positive on hospital day #4 11/20/20  Community onset illness  Date of onset of symptoms unknown   Respiratory failure has progressed, now on Bipap. Overall prognosis for meaningful recovery is very poor  -continue steroid initiated today, dose increased   -pending decision from family re direction of care, will start remdesivir. Increased risk of AE in the context of severe SHANIA acknowledged, R>B at this point. Monitor closely.    -would recommend giving 1u CCP if family plans to continue aggressive care  -isolation as ordered  -DVT ppx   -DC azithromycin     Klebsiella bacteriuria vs UTI  Rocephin day #4, can DC     Sacral pressure ulcer    NKDA        Prognosis poor     D/w RN       Sammy Thomas M.D. Thank you for the opportunity to participate in the care of your patient.     Please do not hesitate to contact me:   [phone] office  [phone] mobile

## 2020-11-21 NOTE — PROGRESS NOTES
11/20/20 2336   NIV Type   Skin Assessment Clean, dry, & intact   Skin Protection for O2 Device Yes   NIV Started/Stopped On   Equipment Type v60   Mode Bilevel   Mask Type Full face mask   Mask Size Medium   Settings/Measurements   IPAP 16 cmH20   CPAP/EPAP 6 cmH2O   Rate Ordered 8   Resp (!) 34   FiO2  100 %   Vt Exhaled 306 mL   Minute Volume 9.9 Liters   Mask Leak (lpm) 26 lpm   Comfort Level Fair   Using Accessory Muscles Yes   SpO2 85   Alarm Settings   Alarms On Y   Press Low Alarm 6 cmH2O   High Pressure Alarm 30 cmH2O   Delay Alarm 20 sec(s)   Resp Rate Low Alarm 6   High Respiratory Rate 50 br/min   Oxygen Therapy/Pulse Ox   SpO2 (!) 85 %

## 2020-11-21 NOTE — PROGRESS NOTES
11/21/20 1217   NIV Type   Skin Assessment Redness (see comment/note)  (mepilex on nose and forehead)   Skin Protection for O2 Device Yes   Equipment Type v60   Mode Bilevel   Mask Type Full face mask   Mask Size Medium   Settings/Measurements   IPAP 16 cmH20   CPAP/EPAP 6 cmH2O   Rate Ordered 8   Resp 28   Insp Rise Time (%) 2 %   FiO2  100 %   Vt Exhaled 358 mL   Minute Volume 11.2 Liters   Mask Leak (lpm) 23 lpm   SpO2 95   Oxygen Therapy/Pulse Ox   O2 Therapy Oxygen   O2 Device PAP (positive airway pressure)   SpO2 95 %   Pulse Oximeter Device Mode Continuous

## 2020-11-21 NOTE — PROGRESS NOTES
Progress Note    HISTORY     CC:  Altered Mental Status           We are following for acute kidney injury       Subjective/   HPI:  Patient remains on bilevel. On hypotonic IV fluids. Labs about the same. Urine output of 1000 ml over the last 24 hours    ROS:  Constitutional:  No fevers, No Chills  Cardiovascular:  No palpations, no edema  Respiratory:   On bilevel     Social Hx:  No family at bedside     Past Medical and Surgical History:  - Reviewed, no changes     EXAM       Objective/     Vitals:    11/21/20 0655 11/21/20 0902 11/21/20 0906 11/21/20 0941   BP: 111/76   135/79   Pulse: 82   91   Resp: 24 26 26 24   Temp: 97.3 °F (36.3 °C)   98.5 °F (36.9 °C)   TempSrc: Axillary   Axillary   SpO2: 94%  94% 95%   Weight:       Height:         24HR INTAKE/OUTPUT:      Intake/Output Summary (Last 24 hours) at 11/21/2020 0950  Last data filed at 11/21/2020 0946  Gross per 24 hour   Intake 1087 ml   Output 1000 ml   Net 87 ml     Constitutional:  Ill appearing   Eyes:  Pupils reactive, sclera clear   Neck:  Normal thyroid, no masses   Cardiovascular:  Regular, no rub  Respiratory:  Decreased breath sounds on the right, on bilevel   Psychiatry:  Appropriate mood/affect, alert  Abdomen: +bs, soft, nt, no masses   Musculoskeletal: No LE edema, no clubbing   Lymphatics:  No LAD in neck, no supraclavicular nodes   :  Rader in place       MEDICAL DECISION MAKING       Data/  Recent Labs     11/19/20  0219 11/20/20  0524 11/21/20  0530   WBC 10.0 8.5 10.8   HGB 14.2 13.5 12.0   HCT 43.0 40.9 37.4   MCV 94.5 95.0 97.9    220 187     Recent Labs     11/19/20  0219  11/19/20  1357 11/20/20  0524 11/21/20  0530   *   < > 153* 152* 151*   K 3.1*  3.1*  --   --  3.4*  3.4* 3.4*  3.4*   *  --   --  117* 117*   CO2 23  --   --  24 25   GLUCOSE 121*  --   --  130* 129*   PHOS 3.8  --   --  3.9 3.6   MG 2.40  --   --  2.50* 2.00   *  --   --  103* 89*   CREATININE 3.2*  --   --  2.2* 2.1* LABGLOM 14*  --   --  21* 23*   GFRAA 17*  --   --  26* 27*    < > = values in this interval not displayed. Assessment/     Acute Kidney Injury.  - Likely from dehydration +/- ATN. - Urinalysis with trace of protein and blood, +LE, bacteria suggestive of UTI.  - CT of A/P did not show any hydronephrosis. - Kidney function is slowly improving, non-oliguric     Hypernatremia.  - From decreased PO fluid intake/dehydration. Not polyuric.       Hypokalemia. - PRN supplementation.     Metabolic Acidosis. - From lactic acid and SHANIA. - Improved with bicarb drip.     Sepsis. - UTI vs pneumonia.  - COVID-19 test negative. - On empiric antibiotics      AMS. - Likely from metabolic encephalopathy. CT of the head with no acute intracranial abnormality.     Plan/     Continue gentle IV fluids, hypotonic with glucose and potassium  Follow labs  Supportive care    -----------------------------  Nelson Correia M.D.   [facility]

## 2020-11-21 NOTE — PROGRESS NOTES
4 Eyes Skin Assessment     The patient is being assess for   Low Braulio/ Shift Hand off    I agree that 2 RN's have performed a thorough Head to Toe Skin Assessment on the patient. ALL assessment sites listed below have been assessed. Areas assessed by both nurses:   [x]   Head, Face, and Ears   [x]   Shoulders, Back, and Chest, Abdomen  [x]   Arms, Elbows, and Hands   [x]   Coccyx, Sacrum, and Ischium  [x]   Legs, Feet, and Heels        Refer to Wound flow in Epic    **SHARE this note so that the co-signing nurse is able to place an eSignature**    Co-signer eSignature: Electronically signed by Judie Rodriguez RN on 11/21/20 at 7:30 AM EST    Does the Patient have Skin Breakdown?   Yes LDA WOUND CARE was Initiated documentation include the Priscilla-wound, Wound Assessment, Measurements, Dressing Treatment, Drainage, and Color\",          Braulio Prevention initiated:  Yes   Wound Care Orders initiated:  Yes      [id number]  nurse consulted for Pressure Injury (Stage 3,4, Unstageable, DTI, NWPT, Complex wounds)and New or Established Ostomies:  No      Primary Nurse eSignature: Electronically signed by Yair Perez RN on 11/21/20 at 7:00 PM EST

## 2020-11-21 NOTE — PROGRESS NOTES
11/21/20 0902   NIV Type   Skin Assessment Clean, dry, & intact   Skin Protection for O2 Device Yes  (mepilex on nose and forehead)   Equipment Type v60   Mode Bilevel   Mask Type Full face mask   Mask Size Medium   Settings/Measurements   IPAP 16 cmH20   CPAP/EPAP 6 cmH2O   Rate Ordered 8   Resp 26   Insp Rise Time (%) 2 %   FiO2  100 %   Vt Exhaled 233 mL   Minute Volume 6.5 Liters   Mask Leak (lpm) 34 lpm   Comfort Level Fair   Using Accessory Muscles No   SpO2 93   Breath Sounds   Right Upper Lobe Diminished   Right Middle Lobe Diminished   Right Lower Lobe Diminished   Left Upper Lobe Diminished   Left Lower Lobe Diminished

## 2020-11-21 NOTE — PROGRESS NOTES
Writer received call from [address] [place] [address] approximately 1500, pt with 6 beats of vtach. Pt resting quietly with eyes closed. Vitals stable. Writer sent message to Dr. Wayne Alva regardingRm [place]  11/21/2020

## 2020-11-21 NOTE — PROGRESS NOTES
11/21/20 1547   NIV Type   Skin Assessment Redness (see comment/note)   Skin Protection for O2 Device Yes   Location Nose;Forehead   Intervention(s) Skin Barrier   Equipment Type v60   Mode Bilevel   Mask Type Full face mask   Mask Size Medium   Settings/Measurements   IPAP 16 cmH20   CPAP/EPAP 6 cmH2O   Rate Ordered 8   Resp 24   Insp Rise Time (%) 2 %   FiO2  95 %   Vt Exhaled 252 mL   Minute Volume 6.6 Liters   Mask Leak (lpm) 17 lpm   Comfort Level Good   SpO2 95   Alarm Settings   Alarms On Y   Press Low Alarm 6 cmH2O   High Pressure Alarm 30 cmH2O   Resp Rate Low Alarm 6   High Respiratory Rate 50 br/min   Oxygen Therapy/Pulse Ox   O2 Therapy Oxygen   O2 Device PAP (positive airway pressure)   SpO2 94 %   Pulse Oximeter Device Mode Continuous

## 2020-11-21 NOTE — FLOWSHEET NOTE
11/20/20 2015   Assessment   Charting Type Shift assessment   Neurological   Level of Consciousness 2   Orientation Level NESTOR   Cognition NESTOR   Language NESTOR   Size R Pupil (mm) Other (Comment)  (Pt will not open her eye)   Size L Pupil (mm) Other (Comment)  (Pt will not opened her eye)   R Hand  NESTOR   L Hand  NESTOR   R Foot Dorsiflexion NESTOR   L Foot Dorsiflexion NESTOR   R Foot Plantar Flexion NESTOR   L Foot Plantar Flexion NESTOR   RUE Motor Response Movement to painful stimulus   Sensation RUE Pain   LUE Motor Response Movement to painful stimulus   Sensation LUE Pain   RLE Motor Response No movement to painful stimulus   Sensation RLE Pain   LLE Motor Response Movement to painful stimulus   Sensation LLE Pain   Gag NESTOR   [place] Coma Scale   Eye Opening 1  (Pt will not opened her eyes when touched.  Moaned on [employer])   Best Verbal Response 2   Best Motor Response 4   [place] Coma Scale Score 7   HEENT   Right Eye Drainage  (dried drainage noted @ eyelashes)   Left Eye Drainage  (Dried drainage noted @ eyelashes)   Right Ear NESTOR   Left Ear NESTOR   Nose Intact   Throat Intact   Neck No tracheal deviation   Tongue Dry   Mucous Membrane Dry   Teeth Missing teeth   Respiratory   Respiratory Pattern Tachypneic   Respiratory Depth Shallow   Respiratory Quality/Effort Labored;Dyspnea at rest   Chest Assessment Chest expansion symmetrical   Breath Sounds   Right Upper Lobe Diminished   Right Middle Lobe Diminished   Right Lower Lobe Diminished   Left Upper Lobe Diminished   Left Lower Lobe Diminished   Cardiac   Cardiac Regularity Regular   Heart Sounds S1, S2   Cardiac Rhythm ST  (on tele monitor)   Rhythm Interpretation   Pulse 105   Cardiac Monitor   Telemetry Monitor On Yes   Telemetry Audible Yes   Telemetry Alarms Set Yes   Telemetry Box Number 121   Gastrointestinal   RUQ Bowel Sounds Hypoactive   LUQ Bowel Sounds Hypoactive   RLQ Bowel Sounds Hypoactive   LLQ Bowel Sounds Hypoactive   Tenderness Soft   Peripheral Wound Type: Pressure Injury  Location: Buttocks  Wound Location Orientation: Inner;Left  Wound Description (Comments): 5.5 cm x 3 cm   Wound Etiology Pressure Unstageable   Dressing Status New dressing applied   Wound Cleansed Irrigated with saline   Dressing Change Due 11/21/20   Wound Assessment Eschar moist   Drainage Amount Scant   Drainage Description Serosanguinous   Odor Malodorous/putrid   Priscilla-wound Assessment Blanchable erythema   Margins Attached edges   Wound 11/17/20 Abdomen Mid;Upper   Date First Assessed/Time First Assessed: 11/17/20 1930   Present on Hospital Admission: Yes  Primary Wound Type: Other (comment)  Location: Abdomen  Wound Location Orientation: Mid;Upper   Dressing Status Old drainage noted   Dressing/Treatment Foam   Dressing Change Due 11/21/20   Wound Assessment Erythema;Eschar moist   Drainage Amount Scant   Drainage Description Serosanguinous   Odor None   Priscilla-wound Assessment Dry/flaky   Wound 11/19/20 Abdomen Mid;Lower   Date First Assessed/Time First Assessed: 11/19/20 1523   Present on Hospital Admission: Yes  Primary Wound Type:  Other (comment)  Location: Abdomen  Wound Location Orientation: Mid;Lower   Dressing Status Old drainage noted   Wound Cleansed Irrigated with saline   Dressing/Treatment Foam   Dressing Change Due 11/21/20   Wound Assessment Pink/red   Drainage Amount Scant   Drainage Description Serosanguinous   Priscilla-wound Assessment Dry/flaky   Margins Attached edges   Urethral Catheter Double-lumen 14 fr   Placement Date/Time: 11/17/20 1000   Inserted by: brodie   Catheter Type: Double-lumen  Tube Size (fr): 14 fr  Catheter Balloon Size: 10 mL  Securement Method: Securing device (Describe)  Urine Returned: Yes   Catheter Indications Stage III or IV perineal and sacral wound OR full thickness perineal/lower extremity burns in incontinent patients   Site Assessment Moist;Red   Urine Color Yellow   Urine Appearance Hazy   Psychosocial   Patient Behaviors Not interactive; Withdrawn   Pt in bed, O2 sat 79 % Pt had Breathing tx. Per RT, on Vapo therm w/ Non re breather masked on @ 15 L . Pt non verbal, moaned @ times during assessment and while repositioning. Will not opened her eyes, noted dried drainage @ eyelashes. Pt repositioned for comfort, Kept warm and safety/fall prevention maintained. Side rails up 3 x 4. HOB on Semi fowlers position. Will continue to monitor.  ANDREW

## 2020-11-21 NOTE — PROGRESS NOTES
11/21/20 0401   NIV Type   Skin Protection for O2 Device Yes   Equipment Type v60   Mode Bilevel   Mask Type Full face mask   Mask Size Medium   Settings/Measurements   IPAP 16 cmH20   CPAP/EPAP 6 cmH2O   Rate Ordered 8   Resp 24   FiO2  100 %   Vt Exhaled 364 mL   Minute Volume 8.7 Liters   Mask Leak (lpm) 31 lpm   Comfort Level Good   Using Accessory Muscles No   SpO2 95   Alarm Settings   Alarms On Y   Press Low Alarm 6 cmH2O   High Pressure Alarm 30 cmH2O   Delay Alarm 20 sec(s)   Resp Rate Low Alarm 6   High Respiratory Rate 50 br/min

## 2020-11-21 NOTE — PROGRESS NOTES
PCR for covid detected writer sent message to Dr. Rio Amin regarding and spoke with Dr. Lachelle Cao regarding. Writer did speak with pt's daughter Jerzy Duenas and was made aware that pt was covid positive.   Niurka Espinal  11/21/2020

## 2020-11-21 NOTE — PROGRESS NOTES
Spoke with CNP Ms. Enrique Del Rio, made her aware ont Pt assessment. CNP Read Pt hx. And MD notes. Orders rec'd. and placed. RT Moises Thomas) notified about BIPAP order. Will Continue to monitor.  ANDREW

## 2020-11-21 NOTE — PROGRESS NOTES
11/20/20 1948   Oxygen Therapy/Pulse Ox   O2 Therapy Oxygen humidified   O2 Device Heated high flow cannula   O2 Flow Rate (L/min) 40 L/min   FiO2  100 %   Resp 28   SPO2 78%  Settings are maxed out on Vapo therm RT layered % on top of HFNC. SPO2 80%  Respirations 28-32 and somewhat agonal at times.   Patient does not want BIPAP or intubated per POA

## 2020-11-21 NOTE — PROGRESS NOTES
Pulmonary & Critical Care Inpatient Progress Note   Puja Hernandez MD     REASON FOR TODAY'S VISIT:  Acute resp failure    SUBJECTIVE:   Failed vapotherm yesterday, family did not want to intubate her at that time. Now has remained on bipap support. Mentation depressed   COVID returned today positive, in isolation    Scheduled Meds:   atorvastatin  40 mg Oral Nightly    metoprolol  2.5 mg Intravenous BID    collagenase   Topical Daily    sodium chloride flush  10 mL Intravenous 2 times per day    cefTRIAXone (ROCEPHIN) IV  1 g Intravenous Q24H    azithromycin  500 mg Intravenous Q24H       Continuous Infusions:   IV infusion builder 100 mL/hr at 11/21/20 1107       PRN Meds:  perflutren lipid microspheres, sodium chloride flush, acetaminophen **OR** acetaminophen, polyethylene glycol, promethazine **OR** ondansetron    ALLERGIES:  Patient has No Known Allergies. Objective:   PHYSICAL EXAM:  /78   Pulse 91   Temp 97.9 °F (36.6 °C) (Axillary)   Resp 26   Ht 4' 10\" (1.473 m)   Wt 173 lb 6.4 oz (78.7 kg)   SpO2 95%   BMI 36.24 kg/m²    Physical Exam       Data Reviewed:   LABS:  CBC:  Recent Labs     11/19/20  0219 11/20/20  0524 11/21/20  0530   WBC 10.0 8.5 10.8   HGB 14.2 13.5 12.0   HCT 43.0 40.9 37.4   MCV 94.5 95.0 97.9    220 187     BMP:  Recent Labs     11/19/20  0219  11/19/20  1357 11/20/20  0524 11/21/20  0530   *   < > 153* 152* 151*   K 3.1*  3.1*  --   --  3.4*  3.4* 3.4*  3.4*   *  --   --  117* 117*   CO2 23  --   --  24 25   PHOS 3.8  --   --  3.9 3.6   *  --   --  103* 89*   CREATININE 3.2*  --   --  2.2* 2.1*    < > = values in this interval not displayed. LIVER PROFILE: No results for input(s): AST, ALT, LIPASE, ALB, BILIDIR, BILITOT, ALKPHOS in the last 72 hours. Invalid input(s): AMYLASE  PT/INR:No results for input(s): PROTIME, INR in the last 72 hours.   APTT:   Recent Labs     11/19/20  1014 11/19/20  1855 11/20/20  0135   APTT 113.7* 68.0* 58.3*     UA:No results for input(s): NITRITE, COLORU, PHUR, LABCAST, WBCUA, RBCUA, MUCUS, TRICHOMONAS, YEAST, BACTERIA, CLARITYU, SPECGRAV, LEUKOCYTESUR, UROBILINOGEN, BILIRUBINUR, BLOODU, GLUCOSEU, AMORPHOUS in the last 72 hours. Invalid input(s): KETONESU  Recent Labs     11/19/20  0228   PHART 7.389   HPX6DGW 38.9   PO2ART 80.2       Vent Information  Skin Assessment: Clean, dry, & intact  FiO2 : 100 %  SpO2: 95 %  SpO2/FiO2 ratio: 94  Humidification Temp: 37  Mask Type: Full face mask  Mask Size: Medium    CXR personally reviewed, reduced lung volumes but no acute process          Assessment:     1. Acute hypoxic resp failure, acute covid respiratory infection  2. PFO and associated shunting  3. Acute encephalopathy, metabolic  4. UTI with sepsis  5. SHANIA    Plan:      -BiPAP support, if mentation improves can trial off and return to vapotherm  -COVID isolation, unclear if her rapid on the 17th was falsely negative or she contracted in while in the hospital. May need to contract trace the [employer] med dept now. Will ask ID for input regarding therapeutics and start solumedrol  -I had a lengthy discussion with her family Kasey Xie) explained her poor prognosis and lack of meaningful survival even if vent support was pursued as well as the disadvantage of prolonged bipap. She was going to discuss with her siblings and get back to us   -Nephro following, on IVF for high sodium  -Atb for UTI, does not need pneumonic coverage    Due to life threatening resp failure with acute covid this patient is critically ill.  Total critical care time involved in her care was 35 mins    Lakisha Hubbard MD

## 2020-11-22 NOTE — PROGRESS NOTES
Pulmonary & Critical Care Inpatient Progress Note   Fito Alvarado MD     REASON FOR TODAY'S VISIT:  Acute resp failure    SUBJECTIVE:   Remains on high oxygen support, trialed on vapotherm today  Family still have not gotten back to use regarding goals of care. Scheduled Meds:   methylPREDNISolone  40 mg Intravenous Q6H    heparin (porcine)  5,000 Units Subcutaneous 3 times per day    remdesivir IVPB  100 mg Intravenous Q24H    atorvastatin  40 mg Oral Nightly    metoprolol  2.5 mg Intravenous BID    collagenase   Topical Daily    sodium chloride flush  10 mL Intravenous 2 times per day       Continuous Infusions:   dextrose 5% in lactated ringers 100 mL/hr at 11/22/20 1104       PRN Meds:  perflutren lipid microspheres, sodium chloride flush, acetaminophen **OR** acetaminophen, polyethylene glycol, promethazine **OR** ondansetron    ALLERGIES:  Patient has No Known Allergies. Objective:   PHYSICAL EXAM:  /78   Pulse 62   Temp 96.8 °F (36 °C) (Axillary)   Resp 19   Ht 4' 10\" (1.473 m)   Wt 181 lb 6.4 oz (82.3 kg)   SpO2 96%   BMI 37.91 kg/m²    Physical Exam  Constitutional:       General: She is in acute distress. Appearance: She is well-developed. She is not diaphoretic. HENT:      Head: Normocephalic and atraumatic. Mouth/Throat:      Pharynx: No oropharyngeal exudate. Eyes:      Pupils: Pupils are equal, round, and reactive to light. Neck:      Musculoskeletal: Neck supple. Vascular: No JVD. Cardiovascular:      Heart sounds: Normal heart sounds. No murmur. No friction rub. No gallop. Pulmonary:      Effort: Respiratory distress present. Breath sounds: No wheezing or rales. Abdominal:      General: Bowel sounds are normal. There is no distension. Palpations: Abdomen is soft. Tenderness: There is no abdominal tenderness. Musculoskeletal: Normal range of motion. Lymphadenopathy:      Cervical: No cervical adenopathy.    Skin:     General: Skin is warm and dry. Findings: No rash. Neurological:      Mental Status: She is alert. She is disoriented. Cranial Nerves: No cranial nerve deficit. Comments: CN 2-12 grossly intact            Data Reviewed:   LABS:  CBC:  Recent Labs     11/20/20 0524 11/21/20 0530 11/22/20  0516   WBC 8.5 10.8 15.2*   HGB 13.5 12.0 11.9*   HCT 40.9 37.4 37.8   MCV 95.0 97.9 97.9    187 182     BMP:  Recent Labs     11/20/20  0524 11/21/20  0530 11/22/20  0516   * 151* 145   K 3.4*  3.4* 3.4*  3.4* 4.3  4.3   * 117* 110   CO2 24 25 23   PHOS 3.9 3.6 4.7   * 89* 73*   CREATININE 2.2* 2.1* 1.7*     LIVER PROFILE:   Recent Labs     11/22/20 0516   AST 18   ALT 8*   BILITOT <0.2   ALKPHOS 95     PT/INR:No results for input(s): PROTIME, INR in the last 72 hours. APTT:   Recent Labs     11/19/20  1855 11/20/20  0135   APTT 68.0* 58.3*     UA:No results for input(s): NITRITE, COLORU, PHUR, LABCAST, WBCUA, RBCUA, MUCUS, TRICHOMONAS, YEAST, BACTERIA, CLARITYU, SPECGRAV, LEUKOCYTESUR, UROBILINOGEN, BILIRUBINUR, BLOODU, GLUCOSEU, AMORPHOUS in the last 72 hours. Invalid input(s): KETONESU  No results for input(s): PHART, GVP1KYB, PO2ART in the last 72 hours. Vent Information  Skin Assessment: Redness (see comment/note)  FiO2 : 95 %  SpO2: 96 %  SpO2/FiO2 ratio: 101.05  Humidification Temp: 37  Mask Type: Full face mask  Mask Size: Medium     CXR personally reviewed, reduced lung volumes but no acute process          Assessment:     1. Acute hypoxic resp failure, acute covid respiratory infection  2. PFO and associated shunting  3. Acute encephalopathy, metabolic  4. UTI with sepsis  5.  SHANIA    Plan:      -BIPAP support  -Vapotherm if mentation allows  -Would not recommend NG tube as this may worsen her respiratory status  -Therapeutics per ID  -Steroids  -Clarify goals of care with family, may need palliative care input   -Overall guarded prognosis     Leidy Park MD

## 2020-11-22 NOTE — PROGRESS NOTES
4 Eyes Skin Assessment     The patient is being assess for   Low Braulio, Shift Handoff    I agree that 2 RN's have performed a thorough Head to Toe Skin Assessment on the patient. ALL assessment sites listed below have been assessed. Areas assessed by both nurses:   [x]   Head, Face, and Ears   [x]   Shoulders, Back, and Chest, Abdomen  [x]   Arms, Elbows, and Hands   [x]   Coccyx, Sacrum, and Ischium  [x]   Legs, Feet, and Heels  Refer to epic    **SHARE this note so that the co-signing nurse is able to place an eSignature**    Co-signer eSignature: Electronically signed by Alejo Fu RN on 11/21/20 at 7:18 PM EST    Does the Patient have Skin Breakdown?   Yes [facility] WOUND CARE was Initiated documentation include the Priscilla-wound, Wound Assessment, Measurements, Dressing Treatment, Drainage, and Color\",          Braulio Prevention initiated:  Yes   Wound Care Orders initiated:  Yes      [id number]  nurse consulted for Pressure Injury (Stage 3,4, Unstageable, DTI, NWPT, Complex wounds)and New or Established Ostomies:  No      Primary Nurse eSignature: Electronically signed by Carlos Seay RN on 11/21/20 at 7:17 PM EST

## 2020-11-22 NOTE — FLOWSHEET NOTE
11/21/20 2052   Assessment   Charting Type Shift assessment   Neurological   Level of Consciousness 1   Orientation Level NESTOR   Cognition Unable to follow commands;NESTOR   Language NESTOR   Size R Pupil (mm) Other (Comment)  (NESTOR)   R Pupil Reaction   (Unable to keep eyes open)   Size L Pupil (mm) 3   L Pupil Shape Round   L Pupil Reaction Sluggish   R Hand  NESTOR   L Hand  NESTOR   R Foot Dorsiflexion NESTOR   L Foot Dorsiflexion NESTOR   R Foot Plantar Flexion NESTOR   L Foot Plantar Flexion NESTOR   RUE Motor Response Movement to painful stimulus   Sensation RUE Pain   LUE Motor Response Movement to painful stimulus   Sensation LUE Pain   RLE Motor Response Movement to painful stimulus   Sensation RLE Pain   LLE Motor Response Movement to painful stimulus   Sensation LLE Pain   Gag NESTOR   [place] Coma Scale   Eye Opening 2   Best Verbal Response 2   Best Motor Response 4   [place] Coma Scale Score 8   HEENT   Right Eye Drainage  (along eyelashes)   Left Eye Drainage  (along eyelashes)   Right Ear NESTOR   Left Ear NESTOR   Nose Intact   Mucous Membrane Dry   Teeth Missing teeth   Respiratory   Respiratory Pattern Tachypneic   Respiratory Depth Shallow   Respiratory Quality/Effort Dyspnea at rest   Chest Assessment Chest expansion symmetrical   Breath Sounds   Right Upper Lobe Diminished   Right Middle Lobe Diminished   Right Lower Lobe Diminished   Left Upper Lobe Diminished   Left Lower Lobe Diminished   Cardiac   Cardiac Regularity Regular   Heart Sounds S1, S2   Cardiac Rhythm Atrial Paced   Rhythm Interpretation   Pulse 60   Cardiac Monitor   Telemetry Monitor On Yes   Telemetry Audible Yes   Telemetry Alarms Set Yes   Telemetry Box Number 121   Pacemaker   Pacemaker Type Permanent   Pacemaker Location Left chest   Gastrointestinal   RUQ Bowel Sounds Hypoactive   LUQ Bowel Sounds Hypoactive   RLQ Bowel Sounds Hypoactive   LLQ Bowel Sounds Hypoactive   Tenderness Soft   Peripheral Vascular   Edema Generalized +1;Non-pitting RUE Edema +1;Non-pitting   LUE Edema +1;Non-pitting   RLE Edema +1;Non-pitting   LLE Edema +1;Non-pitting   RUE Neurovascular Assessment   Capillary Refill Less than/equal to 3 seconds   Color Pale   Temperature Cool   R Radial Pulse +2   LUE Neurovascular Assessment   Capillary Refill Less than/equal to 3 seconds   Color Pale   Temperature Cool   L Radial Pulse +2   RLE Neurovascular Assessment   Capillary Refill Less than/equal to 3 seconds   Color Pale   Temperature Cool   R Pedal Pulse +1   R Calf Tenderness  NESTOR   LLE Neurovascular Assessment   Capillary Refill Less than/equal to 3 seconds   Color Pale   Temperature Cool   L Pedal Pulse +1   L Calf Tenderness NESTOR   Skin Color/Condition   Skin Color Pale;Ecchymosis   Skin Condition/Temp Cool;Dry   Skin Integrity   Skin Integrity Abrasion;Bruising   Location scattered   Preventative Dressing Yes   Skin Fold Management Yes   Multiple Skin Integrity Sites Yes   Dressing Site Heel;Sacrum   Date Applied 11/21/20   Assessed this shift? Yes   Dressing Site Groin   Treatment Dry soft cloth or ABD   Date applied? 11/21/20   Assessed this shift Moist   Skin Integrity Site 2   Skin Integrity Location 2 Abrasion   Location 2 Lower abdomen   Preventative Dressing Yes   Assessed this shift?  Yes   Skin Integrity Site 3   Skin Integrity Location 3 Other (Comment)    Location 3 Sacrum   Skin Integrity Site 4   Skin Integrity Location 4 Redness   Location 4 marcos area, Aazm groin   Preventative Dressing Yes   Dressing Site Sacrum   Musculoskeletal   RUE Limited movement;Weakness   LUE Limited movement;Weakness   RL Extremity Limited movement;Weakness   LL Extremity Limited movement;Weakness   Genitourinary   Genitourinary (WDL)   (Pt has serna cath)   Flank Tenderness NESTOR   Suprapubic Tenderness NESTOR   Dysuria NESTOR   Wound 11/17/20 Buttocks Inner;Left 5.5 cm x 3 cm   Date First Assessed/Time First Assessed: 11/17/20 1930   Present on Hospital Admission: Yes  Primary Wound Type: Pressure Injury  Location: Buttocks  Wound Location Orientation: Inner;Left  Wound Description (Comments): 5.5 cm x 3 cm   Dressing Status Dry; Intact   Wound Assessment Eschar moist   Drainage Amount Scant   Drainage Description Serosanguinous   Priscilla-wound Assessment Blanchable erythema   Margins Attached edges   Wound 11/17/20 Abdomen Mid;Upper   Date First Assessed/Time First Assessed: 11/17/20 1930   Present on Hospital Admission: Yes  Primary Wound Type: Other (comment)  Location: Abdomen  Wound Location Orientation: Mid;Upper   Dressing Status Dry; Intact   Dressing/Treatment Foam   Wound Assessment Erythema;Eschar dry   Drainage Description Serosanguinous   Priscilla-wound Assessment Dry/flaky   Margins Attached edges   Wound 11/19/20 Abdomen Mid;Lower   Date First Assessed/Time First Assessed: 11/19/20 1523   Present on Hospital Admission: Yes  Primary Wound Type: Other (comment)  Location: Abdomen  Wound Location Orientation: Mid;Lower   Dressing Status Dry   Dressing/Treatment Foam   Wound Assessment Pink/red   Drainage Amount Scant   Drainage Description Serosanguinous   Margins Attached edges   Urethral Catheter Double-lumen 14 fr   Placement Date/Time: 11/17/20 1000   Inserted by: brodie   Catheter Type: Double-lumen  Tube Size (fr): 14 fr  Catheter Balloon Size: 10 mL  Securement Method: Securing device (Describe)  Urine Returned: Yes   Catheter Indications Stage III or IV perineal and sacral wound OR full thickness perineal/lower extremity burns in incontinent patients   Site Assessment Moist;Red; No urethral drainage   Urine Color Yellow   Urine Appearance Hazy   Pt in bed, shift assessment done, Pt opened Left eye briefly. Cont. On Bipap machine, No visual s/s of pain/discomfort at this time. A paced on tele monitor. Safety/fall prevention maintained. Will continue to monitor.  ANDREW

## 2020-11-22 NOTE — PROGRESS NOTES
[facility] notified RN, writer Pt had 47 beats of V-tach on tele monitor. RN was just done giving morning care to Pt. And Repositioned her on left side  . Pt does not appear to be in pain or distress ( same as on initial assessment)she is resting quietly in bed. O2 sat 100 % on Cont. Bipap. Will continue to monitor.  ANDREW

## 2020-11-22 NOTE — PROGRESS NOTES
Writer spoke with Dr. Brandon Villa regarding plan of care, that pt currently on vapotherm. Per MD order to place NGT, to start tube feed jevity 1.5, to infuse at 20mL/hr until dietician recommendations tomorrow. Writer will place orders. Gretel Letters  11/22/2020    1537 see orders placed by Dr. Brandon Villa. Gretel Letters  11/22/2020    1546 writer saw Dr. Elidia Cooley note, made aware writer spoke with family who is agreeable to NGT placement and feeding. Per MD, ok to place NGT.   Gretel Letters  11/22/2020

## 2020-11-22 NOTE — PROGRESS NOTES
Writer spoke with Edmundo Lennox, given update on pt and plan of care, agreeable to NGT tube and feeding.   Taya Velasquez  11/22/2020

## 2020-11-22 NOTE — PROGRESS NOTES
Pt resting quietly in bed at this time. Pt more alert, able to state full name, date of birth and knows is in hospital at this time. Able to slightly follow some simple commands. Pt does not appear to be in any pain or distress at this time. Lungs diminished. Pt continues with occasional congested, non productive cough. Oral care being provided, pt allowing more this afternoon. Paced on tele. Pt continues to be turned and repositioned with wedge pillow. BLE remain elevated off bed onto pillow with heels floated off pillow, mepilex's in place. Bed alarm in place and activated. Will continue to monitor.   Jackelin Sauceda  11/22/2020

## 2020-11-22 NOTE — PROGRESS NOTES
Hospitalist Progress Note      PCP: Jae Macario MD    Date of Admission: 11/17/2020    Chief Complaint: Fatigue and cough    Hospital Course: Reviewed H&P    Subjective: Patient seen as a revisit per Covid protocol. RN reports patient opened her eyes to commands this morning and squeezed her fingers when asked to. Reporting minimal improvement mentation wise. Continue to be intermittent BiPAP. W/d/w pulmonology regarding transitioning to OptiFlow and to allow NG tube placement and initiation of nutrition.     -  Her condition continued to be critical with guarded prognosis at the best.      -Noted renal functions improving. Received call from neurologist this morning stating that he would be in to assess patient tomorrow and after reviewing case, recommended no further testing at this time.      Medications:  Reviewed    Infusion Medications    dextrose 5% in lactated ringers 100 mL/hr at 11/22/20 1104     Scheduled Medications    methylPREDNISolone  40 mg Intravenous Q6H    heparin (porcine)  5,000 Units Subcutaneous 3 times per day    remdesivir IVPB  100 mg Intravenous Q24H    atorvastatin  40 mg Oral Nightly    metoprolol  2.5 mg Intravenous BID    collagenase   Topical Daily    sodium chloride flush  10 mL Intravenous 2 times per day     PRN Meds: perflutren lipid microspheres, sodium chloride flush, acetaminophen **OR** acetaminophen, polyethylene glycol, promethazine **OR** ondansetron      Intake/Output Summary (Last 24 hours) at 11/22/2020 1333  Last data filed at 11/22/2020 1314  Gross per 24 hour   Intake 1816.03 ml   Output 750 ml   Net 1066.03 ml       Physical Exam Performed:  /78   Pulse 62   Temp 96.8 °F (36 °C) (Axillary)   Resp 19   Ht 4' 10\" (1.473 m)   Wt 181 lb 6.4 oz (82.3 kg)   SpO2 96%   BMI 37.91 kg/m²     I performed an audiovisual assessment, based on new provisions and guidance offered by [facility] on March 18, 2020 in setting of COVID-19 outbreak and in order to preserve personal protective equipment in accordance with the flexibilities announced by CMS on March 30, 2020. References:   https://med. [email or web address]/Resources/COVID-19/3_18%20Telemed%20Guidance%20Updated%20March%2018. pdf?[email or web address]   [email or web address] pdf     Bedside physical examination deferred. However, I did visually inspect the patient and observed the following:     General appearance: On BiPAP at this time  Neck: Deferred. Respiratory: Increased respiratory effort. Cardiovascular: Deferred. Abdomen: Deferred. Musculoskelatal: Deferred. Neurologic:  Deferred. Psychiatric: Deferred  Skin: Deferred. Labs:   Recent Labs     11/20/20  0524 11/21/20  0530 11/22/20  0516   WBC 8.5 10.8 15.2*   HGB 13.5 12.0 11.9*   HCT 40.9 37.4 37.8    187 182     Recent Labs     11/20/20  0524 11/21/20  0530 11/22/20  0516   * 151* 145   K 3.4*  3.4* 3.4*  3.4* 4.3  4.3   * 117* 110   CO2 24 25 23   * 89* 73*   CREATININE 2.2* 2.1* 1.7*   CALCIUM 9.5 8.9 9.0   PHOS 3.9 3.6 4.7       Recent Labs     11/22/20  0516   CKTOTAL 189       Urinalysis:    Lab Results   Component Value Date    NITRU Negative 11/17/2020    WBCUA >100 11/17/2020    BACTERIA 3+ 11/17/2020    RBCUA 5-10 11/17/2020    BLOODU TRACE-INTACT 11/17/2020    SPECGRAV 1.025 11/17/2020    GLUCOSEU Negative 11/17/2020       Radiology:  XR CHEST PORTABLE   Final Result   Left-sided dual lead pacemaker/ICD seen in place. Ill-defined left lower lobe opacity seen. No pneumothorax. Recommend   comparison with clinical exam and follow-up films. NM LUNG VENT/PERFUSION (VQ)   Final Result   Low probability for pulmonary embolus. VL Extremity Venous Bilateral   Final Result      XR CHEST PORTABLE   Final Result   Bibasilar airspace disease not significantly changed.          CT ABDOMEN PELVIS WO CONTRAST   Final encephalopathy -likely due to above -ongoing  -CT head negative for acute intracranial abnormality and CT C-spine negative for fracture.  -Continue neurochecks and supportive care.  -Requested neurology consultation today.     5. Hypertension -blood pressure normal on admission. Resumed IV metoprolol 2.5 mg twice daily(as patient n.p.o.) -monitor     6. History of pacemaker -stable     7.  Elevated troponin POA -likely in the setting of SHANIA. Troponin 0.12 on admission. Continue to trend. 8.  Acute right isolated distal DVT of the peroneal vein -as noted on bilateral venous duplex on 11/19/2020. VQ scan low probability for PE. Discontinue heparin GTT given low risk for progression to PE and risks outweigh benefits. 9.  Acute hypoxic respiratory failure likely in the setting of sepsis related hydration/Covid infection  -Rapid Covid testing on admission negative ; given worsening respiratory failure Covid PCR sent by pulmonology on 11/20/2020 resulted positive. Continue droplet plus isolation  -Currently requiring BiPAP. Follow-up chest x-ray on 11/20/2020 with mild vascular congestion otherwise no worsening infiltrates. -Echocardiogram obtained on 11/20/2020 s/o PFO/ASD; normal LVEF 55 to 60% with no regional wall motion abnormalities. -Pulmonology/critical care consulted and assisting  with management    10. Right sacral/buttock pressure ulcer with eschar POA -noted by wound care on 11/19/2020; recommended Santyl application and surgical consult for possible wound debridement  -General surgery evaluated and discussed with patient's daughter -who stated patient's wishes to be no intubation. Anesthesia agreeable. Plan to continue conservative management at this time. Will follow     DVT Prophylaxis: Heparin  SQ  Diet: Diet NPO Effective Now  Code Status: DNR-CCA    PT/OT Eval Status: Not yet ordered    Dispo -patient's condition remains critical and her prognosis is guarded.   Discussed with daughter/HC QUYNH Copeland face-to-face for more than 30 minutes on 11/20/2020. CODE STATUS amended to DNR CCA. We will continue ongoing EOL discussions and consider patient for palliative care evaluation. The note was completed using Dragon -speech recognition software & EMR  . Every effort was made to ensure accuracy; however, inadvertent computerized transcription errors may be present.     Emma Linder MD

## 2020-11-22 NOTE — PROGRESS NOTES
MD paged for FYI- Pt being admitted for Sepsis, UTI, Covid ( +), Pt has hx of pacemaker, Cardiology was consulted but s/o This morning # 0631 hrs. after morning care was rendered w/ pt , [facility] notified RN Pt had 47 beats of V-tach. She was A - paced on tele overnight. Cardiology S/O. Just want you to be aware about V-tach episode.  ThanksTeddy Ford RN [phone]

## 2020-11-22 NOTE — PROGRESS NOTES
11/22/20 0440   NIV Type   Skin Protection for O2 Device Yes   Equipment Type V60   Mode Bilevel   Mask Type Full face mask   Mask Size Medium   Settings/Measurements   IPAP 16 cmH20   CPAP/EPAP 6 cmH2O   Resp 23   FiO2  95 %   Vt Exhaled 315 mL   Minute Volume 8 Liters   Mask Leak (lpm) 17 lpm   Comfort Level Good   Using Accessory Muscles No   SpO2 95

## 2020-11-22 NOTE — PROGRESS NOTES
NGT placed to R nare, marked at 56cm, pt tolerated well, O2 sats maintained 96% and greater, KUB ordered to confirm placement.   Ghulam Aguirre  11/22/2020

## 2020-11-22 NOTE — PROGRESS NOTES
11/22/20 0039   NIV Type   $NIV $Daily Charge   Skin Protection for O2 Device Yes   Equipment Type V60   Mode Bilevel   Mask Type Full face mask   Mask Size Medium   Settings/Measurements   IPAP 16 cmH20   CPAP/EPAP 6 cmH2O   Resp 30   FiO2  95 %   Vt Exhaled 247 mL   Minute Volume 7 Liters   Mask Leak (lpm) 13 lpm   Comfort Level Good   Using Accessory Muscles No   SpO2 94

## 2020-11-22 NOTE — PROGRESS NOTES
11/22/20 1204   NIV Type   NIV Started/Stopped On   Equipment Type v60   Mode Bilevel   Mask Type Full face mask   Mask Size Medium   Settings/Measurements   IPAP 16 cmH20   CPAP/EPAP 6 cmH2O   Rate Ordered 8   Resp (!) 3   Insp Rise Time (%) 2 %   Vt Exhaled 539 mL   Mask Leak (lpm) 24 lpm   Comfort Level Good   Using Accessory Muscles No   SpO2 95   Breath Sounds   Right Upper Lobe Diminished   Right Middle Lobe Diminished   Right Lower Lobe Diminished   Left Upper Lobe Diminished   Left Lower Lobe Diminished   Patient Observation   Observations mepilex in place   Alarm Settings   Alarms On Y   Press Low Alarm 6 cmH2O   High Pressure Alarm 30 cmH2O   Delay Alarm 20 sec(s)   Resp Rate Low Alarm 6   High Respiratory Rate 50 br/min

## 2020-11-22 NOTE — PROGRESS NOTES
Progress Note    HISTORY     CC:  Altered Mental Status           COVID19 +           We are following for acute kidney injury       Subjective/   HPI:  Labs continue to improve. Mental status does not. She remains on bilevel. On hypotonic fluids with K    ROS:  Constitutional:  No fevers, No Chills  Cardiovascular:  No palpations, no edema  Respiratory:   On bilevel     Social Hx:  No family at bedside     Past Medical and Surgical History:  - Reviewed, no changes     EXAM       Objective/     Vitals:    11/22/20 0440 11/22/20 0519 11/22/20 0820 11/22/20 0912   BP:    (!) 141/66   Pulse:    63   Resp: 23 21 19   Temp:    96.4 °F (35.8 °C)   TempSrc:    Axillary   SpO2:   100% 98%   Weight:  181 lb 6.4 oz (82.3 kg)     Height:         24HR INTAKE/OUTPUT:      Intake/Output Summary (Last 24 hours) at 11/22/2020 9438  Last data filed at 11/22/2020 8628  Gross per 24 hour   Intake 2039.03 ml   Output 750 ml   Net 1289.03 ml     Constitutional:  Ill appearing   Eyes:  Pupils reactive, sclera clear   Neck:  Normal thyroid, no masses   Cardiovascular:  Regular, no rub  Respiratory:  Decreased breath sounds on the right, on bilevel   Psychiatry:  Somnolent, minimal interaction   Abdomen: +bs, soft, nt, no masses   Musculoskeletal: No LE edema, no clubbing   Lymphatics:  No LAD in neck, no supraclavicular nodes   :  Rader in place       MEDICAL DECISION MAKING       Data/  Recent Labs     11/20/20  0524 11/21/20  0530 11/22/20  0516   WBC 8.5 10.8 15.2*   HGB 13.5 12.0 11.9*   HCT 40.9 37.4 37.8   MCV 95.0 97.9 97.9    187 182     Recent Labs     11/20/20  0524 11/21/20  0530 11/22/20  0516   * 151* 145   K 3.4*  3.4* 3.4*  3.4* 4.3  4.3   * 117* 110   CO2 24 25 23   GLUCOSE 130* 129* 151*   PHOS 3.9 3.6 4.7   MG 2.50* 2.00  --    * 89* 73*   CREATININE 2.2* 2.1* 1.7*   LABGLOM 21* 23* 29*   GFRAA 26* 27* 35*       Assessment/     Acute Kidney Injury.  - Likely from dehydration +/- ATN. - Urinalysis with trace of protein and blood, +LE, bacteria suggestive of UTI.  - CT of A/P did not show any hydronephrosis. - Kidney function is slowly improving, non-oliguric. Much improved metabolic picture      [id number] +  - repeat test on hospital day #4  - ID following  - Poor prognosis     Hypernatremia.  - From decreased PO fluid intake/dehydration. Not polyuric. - Better with hypotonic fluids      Hypokalemia. - PRN supplementation.     Metabolic Acidosis. - From lactic acid and SHANIA. - Improved with bicarb drip.     Sepsis. - UTI vs pneumonia.  - COVID-19 test +  - On empiric antibiotics      AMS. - Metabolic picture has improved with minimal improvement, ? viral encephalopathy. CT of the head with no acute intracranial abnormality.   Neurology following     Plan/     Continue gentle IV fluids, D5LR  Follow labs  Supportive care    -----------------------------  Huseyin Nolasco M.D.   [facility]

## 2020-11-23 PROBLEM — R65.20 SEVERE SEPSIS (HCC): Status: ACTIVE | Noted: 2020-01-01

## 2020-11-23 NOTE — CONSULTS
In patient Neurology consult        [facility]      Diana Brownlee, [address]  1940    Date of Service: 11/23/2020    Referring Physician: Cassius Porter MD    Most of the history was obtained from detailed chart reviewing and discussion with the patient's primary team. The patient is currently confused and unable to provide me with accurate history. Reason for the consult and CC: Acute encephalopathy. HPI:   The patient is a 79y.o.  years old female with multiple medical problems was admitted few days ago with acute confusion and lethargy. Degree was severe symptoms started few days prior to admission. She was admitted for medical management. Initial blood test revealed acute kidney injury and hypernatremia. Further work-up with viral titer for COVID-19 came back negative and then PCR came back positive few days ago. Initial CT of the head showed no acute findings. The patient remained encephalopathic and neurology has been consulted. Over the last 2 days, she continues to have persistent encephalopathy and respiratory failure requiring oxygen support and supplementation. No witnessed seizure. She is currently on Solu-Medrol in addition to Remdesivir. The patient is currently encephalopathic. Unable to give any more history. Other review of system was unremarkable. History reviewed. No pertinent family history.       Past Medical History:   Diagnosis Date    Arthritis     CAD (coronary artery disease)     pacemaker/defib    COVID-19 11/20/2020    History of blood transfusion     Hypertension     Orthostatic hypotension     Pacemaker      Past Surgical History:   Procedure Laterality Date    CARDIAC SURGERY      pacemaker, defib    HYSTERECTOMY      JOINT REPLACEMENT      right and left    AK EGD FLEX REMOVAL LESION(S) BY HOT BIOPSY FORCEPS  11/12/2018    ESOPHAGEAL DILATION Flores Dangelo #54 performed by Maribel Sanabria DO at [address] [place] [address] UPPER GASTROINTESTINAL ENDOSCOPY      erosive gastritis     UPPER GASTROINTESTINAL ENDOSCOPY N/A 11/12/2018    EGD BIOPSY performed by Homar Miller DO at [place] [address]. History     Tobacco Use    Smoking status: Never Smoker    Smokeless tobacco: Never Used   Substance Use Topics    Alcohol use: No    Drug use: No     No Known Allergies  Current Facility-Administered Medications   Medication Dose Route Frequency Provider Last Rate Last Dose    dextrose 5 % in lactated ringers infusion   Intravenous Continuous Anusha Burrows MD 75 mL/hr at 11/23/20 0202      methylPREDNISolone sodium (SOLU-MEDROL) injection 40 mg  40 mg Intravenous Q6H Elly Novoa MD   40 mg at 11/23/20 0640    heparin (porcine) injection 5,000 Units  5,000 Units Subcutaneous 3 times per day Anusha Burrows MD   5,000 Units at 11/23/20 0639    remdesivir 100 mg in sodium chloride 0.9 % 250 mL IVPB  100 mg Intravenous Q24H Elly Novoa MD   Stopped at 11/22/20 1958    perflutren lipid microspheres (DEFINITY) injection 1.65 mg  1.5 mL Intravenous ONCE PRN Anusha Burrows MD        atorvastatin (LIPITOR) tablet 40 mg  40 mg Oral Nightly Anusha Burrows MD        metoprolol (LOPRESSOR) injection 2.5 mg  2.5 mg Intravenous BID Anusha Burrows MD   2.5 mg at 11/22/20 1954    collagenase ointment   Topical Daily Anusha Burrows MD        sodium chloride flush 0.9 % injection 10 mL  10 mL Intravenous 2 times per day Anusha Burrows MD   10 mL at 11/22/20 1958    sodium chloride flush 0.9 % injection 10 mL  10 mL Intravenous PRN Anusha Burrows MD   10 mL at 11/22/20 0052    acetaminophen (TYLENOL) tablet 650 mg  650 mg Oral Q6H PRN Anusha Burrows MD        Or    acetaminophen (TYLENOL) suppository 650 mg  650 mg Rectal Q6H PRN Anusha Burrows MD        polyethylene glycol (GLYCOLAX) packet 17 g  17 g Oral Daily PRN MD Kerry Collins promethazine (PHENERGAN) tablet 12.5 mg  12.5 mg Oral Q6H PRN Griselda Narayan MD        Or    ondansetron (ZOFRAN) injection 4 mg  4 mg Intravenous Q6H PRN Griselda Narayan MD           ROS: 10-14 system review was limited due to MS changes or as per HPI. Constitutional:   Vitals:    11/23/20 0155 11/23/20 0414 11/23/20 0435 11/23/20 0442   BP: (!) 173/79  (!) 158/82    Pulse: 61  90    Resp: 20  18    Temp: 97.8 °F (36.6 °C)  98.5 °F (36.9 °C)    TempSrc: Axillary  Axillary    SpO2: 96% 98% 95%    Weight:    185 lb 8 oz (84.1 kg)   Height:           General appearance: Lethargic  Eye: PRRR  Fundus: Funduscopic examination could not be performed due to patient's poor cooperation and COVID-19 restriction. Neck: supple  Cardiovascular: No lower leg edema with good pulsation. Mental Status:   She opens her eyes to voice and goes back to sleep  Poor attention concentration  Unable to assess memory or fund of knowledge  Unable to assess language  Cranial Nerves:   II: Visual fields: NT due to confusion. Pupils: equal, round, reactive to light  III,IV,VI: Extra Ocular Movements are intact. No nystagmus  V: Facial sensation : NT due to confusion  VII: Facial strength and movements: intact and symmetric  VIII: Hearing: NT due to confusion  IX: Palate elevation NT due to confusion  XI: Shoulder shrug: NT due to confusion  XII: Tongue movements: NT due to confusion  Musculoskeletal: Generalized diffuse weakness with poor effort. Tone: Normal tone. No rigidity. Reflexes: Symmetric 2+ in both arms and 2+ in the legs. Planters: flexor bilaterally.   Coordination: NT due to confusion  Sensation: NT due to confusion  Gait/Posture: NT due to confusion    Data:  LABS:   Lab Results   Component Value Date     11/23/2020    K 3.7 11/23/2020    K 3.8 11/23/2020     11/23/2020    CO2 25 11/23/2020    BUN 72 11/23/2020    CREATININE 1.4 11/23/2020    GFRAA 44 11/23/2020    LABGLOM 36 11/23/2020 GLUCOSE 159 11/23/2020    PHOS 3.5 11/23/2020    MG 1.70 11/23/2020    CALCIUM 9.1 11/23/2020     Lab Results   Component Value Date    WBC 12.4 11/23/2020    RBC 3.73 11/23/2020    HGB 11.6 11/23/2020    HCT 35.8 11/23/2020    MCV 95.9 11/23/2020    RDW 14.4 11/23/2020     11/23/2020   No results found for: INR, PROTIME    Neuroimaging were independently reviewed by me. Reviewed notes from different physicians  Reviewed lab and blood testing    Impression:  Acute encephalopathy, status post COVID-19 exposure. Severe. Likely multifactorial metabolic encephalopathy. I see no reason to repeat CT head at this point. Her exam showed diffuse encephalopathy but no focal findings. Sepsis  Acute kidney injury  COVID-19  Acute hypoxic respiratory failure  Hypernatremia      Recommendation:  Continue current supportive care  Continue hydration and follow kidney function testing and sodium level  Continue current management for COVID-19  Solu-Medrol  Insulin sliding scale  Blood sugar monitor  Hydration  Patient's family and POA changed CODE STATUS to DNR/CCA. Prognosis remains guarded  Nothing to add from neurology at this point  Please call for questions or if new changes with her neuro status  Discussed with primary team.        Thank you for referring such patient. If you have any questions regarding my consult note, please don't hesitate to call me. Sinai Muñoz MD  [phone]    This dictation was generated by voice recognition computer software.  Although all attempts are made to edit the dictation for accuracy, there may be errors in the  transcription that are not intended

## 2020-11-23 NOTE — PROGRESS NOTES
Nephrology Progress Note   [email or web address]      This patient is a 79year old female whom we are following for SHANIA. Subjective: The patient was seen and examined. Mental status slowly improving. Sodium up to 149mmol/L this morning. Family History: No family at bedside. ROS: Unable to obtain      Vitals:  BP (!) 177/74   Pulse 60   Temp 97.6 °F (36.4 °C) (Oral)   Resp 20   Ht 4' 10\" (1.473 m)   Wt 185 lb 8 oz (84.1 kg)   SpO2 97%   BMI 38.77 kg/m²   I/O last 3 completed shifts: In: 387 [I.V.:387]  Out: 700 [Urine:700]  No intake/output data recorded. Physical Exam:  Physical Exam  Vitals signs reviewed. Constitutional:       General: She is not in acute distress. HENT:      Head: Normocephalic and atraumatic. Eyes:      General: No scleral icterus. Conjunctiva/sclera: Conjunctivae normal.   Cardiovascular:      Rate and Rhythm: Tachycardia present. Heart sounds: No friction rub. Pulmonary:      Effort: Pulmonary effort is normal.      Comments: Coarse breath sounds bilateral  Abdominal:      General: Bowel sounds are normal. There is no distension. Tenderness: There is no abdominal tenderness. Musculoskeletal:      Right lower leg: Edema present. Left lower leg: Edema present.            Medications:   methylPREDNISolone  40 mg Intravenous Q6H    heparin (porcine)  5,000 Units Subcutaneous 3 times per day    remdesivir IVPB  100 mg Intravenous Q24H    atorvastatin  40 mg Oral Nightly    metoprolol  2.5 mg Intravenous BID    collagenase   Topical Daily    sodium chloride flush  10 mL Intravenous 2 times per day         Labs:  Recent Labs     11/21/20  0530 11/22/20  0516 11/23/20  0534   WBC 10.8 15.2* 12.4*   HGB 12.0 11.9* 11.6*   HCT 37.4 37.8 35.8*   MCV 97.9 97.9 95.9    182 198     Recent Labs     11/21/20  0530 11/22/20  0516 11/23/20  0534   * 145 149*   K 3.4*  3.4* 4.3  4.3 3.8  3.7   * 110 115*   CO2 25 23 25   GLUCOSE 129* 151* 159*   PHOS 3.6 4.7 3.5   MG 2.00  --  1.70*   BUN 89* 73* 72*   CREATININE 2.1* 1.7* 1.4*   LABGLOM 23* 29* 36*   GFRAA 27* 35* 44*           Assessment/Plan:    Acute Kidney Injury.  - Likely from dehydration +/- ATN. - Urinalysis with trace of protein and blood, +LE, bacteria suggestive of UTI.  - CT of A/P did not show any hydronephrosis. - Kidney function is slowly improving, non-oliguric. Continue with gentle IVF hydration.  - Please avoid any nephrotoxic agents such as NSAIDs or IV contrast unless deemed necessary.  - Renal function panel daily.     Hypernatremia.  - From decreased PO fluid intake/dehydration. Not polyuric.  - Change IVF to D5 + 20meq of KCl at 75cc/hr. Hypokalemia/Hypomagnesemia.  - Improved with supplementation.     Metabolic Acidosis. - From lactic acid and SHANIA. - Improved with bicarb drip.     Acute Hypoxic Respiratory Failure/COVID 19 Infection.  - Pulmonary and ID following.  - On steroid and Remdesivir. Thank you for allowing me to participate in the care of this patient. Please do not hesitate to contact me at [phone] if with questions. Please do not hesitate to contact me at [phone] if with questions. Thank you!     Susanne Pritchett MD  11/23/2020  [facility]

## 2020-11-23 NOTE — PROGRESS NOTES
Hospitalist Progress Note      PCP: Dominique Shirley MD    Date of Admission: 11/17/2020    Chief Complaint: Fatigue and cough    Hospital Course: Reviewed H&P    Subjective: Patient seen as A/V visit per Covid protocol. Reviewed overnight RN note  -patient becoming more alert, able to state full name, date of birth and knows that she is in a hospital.  Started on tube feeds and dietitian to adjust to goal rate. Currently on OptiFlow at 70% 40 L/min.    -Noted renal functions improving. Medications:  Reviewed    Infusion Medications    IV infusion builder 75 mL/hr at 11/23/20 1047     Scheduled Medications    methylPREDNISolone  40 mg Intravenous Q6H    heparin (porcine)  5,000 Units Subcutaneous 3 times per day    remdesivir IVPB  100 mg Intravenous Q24H    atorvastatin  40 mg Oral Nightly    metoprolol  2.5 mg Intravenous BID    collagenase   Topical Daily    sodium chloride flush  10 mL Intravenous 2 times per day     PRN Meds: perflutren lipid microspheres, sodium chloride flush, acetaminophen **OR** acetaminophen, polyethylene glycol, promethazine **OR** ondansetron      Intake/Output Summary (Last 24 hours) at 11/23/2020 1251  Last data filed at 11/23/2020 1235  Gross per 24 hour   Intake 0 ml   Output 700 ml   Net -700 ml       Physical Exam Performed:  BP (!) 168/88   Pulse 60   Temp 97.4 °F (36.3 °C) (Axillary)   Resp 19   Ht 4' 10\" (1.473 m)   Wt 185 lb 8 oz (84.1 kg)   SpO2 98%   BMI 38.77 kg/m²     I performed an audiovisual assessment, based on new provisions and guidance offered by [facility] on March 18, 2020 in setting of COVID-19 outbreak and in order to preserve personal protective equipment in accordance with the flexibilities announced by CMS on March 30, 2020. References:   https://med. [place].gov/Portals/0/Resources/COVID-19/3_18%20Telemed%20Guidance%20Updated%20March%2018. pdf?bjq=[email or web address] http://[employer]/. pdf     Bedside physical examination deferred. However, I did visually inspect the patient and observed the following:     General appearance: On OptiFlow at 70% FiO2 and 40 L/min. Neck: Deferred. Respiratory:  normal respiratory effort. Cardiovascular: Deferred. Abdomen: Deferred. Musculoskelatal: Deferred. Neurologic:  Deferred. Psychiatric: Deferred  Skin: Deferred. Labs:   Recent Labs     11/21/20  0530 11/22/20  0516 11/23/20  0534   WBC 10.8 15.2* 12.4*   HGB 12.0 11.9* 11.6*   HCT 37.4 37.8 35.8*    182 198     Recent Labs     11/21/20  0530 11/22/20  0516 11/23/20  0534   * 145 149*   K 3.4*  3.4* 4.3  4.3 3.8  3.7   * 110 115*   CO2 25 23 25   BUN 89* 73* 72*   CREATININE 2.1* 1.7* 1.4*   CALCIUM 8.9 9.0 9.1   PHOS 3.6 4.7 3.5       Recent Labs     11/22/20  0516   CKTOTAL 189       Urinalysis:    Lab Results   Component Value Date    NITRU Negative 11/17/2020    WBCUA >100 11/17/2020    BACTERIA 3+ 11/17/2020    RBCUA 5-10 11/17/2020    BLOODU TRACE-INTACT 11/17/2020    SPECGRAV 1.025 11/17/2020    GLUCOSEU Negative 11/17/2020       Radiology:  XR ABDOMEN (KUB) (SINGLE AP VIEW)   Final Result   Enteric tube in expected position. XR CHEST PORTABLE   Final Result   Left-sided dual lead pacemaker/ICD seen in place. Ill-defined left lower lobe opacity seen. No pneumothorax. Recommend   comparison with clinical exam and follow-up films. NM LUNG VENT/PERFUSION (VQ)   Final Result   Low probability for pulmonary embolus. VL Extremity Venous Bilateral   Final Result      XR CHEST PORTABLE   Final Result   Bibasilar airspace disease not significantly changed. CT ABDOMEN PELVIS WO CONTRAST   Final Result   No acute inflammatory process or bowel obstruction. Negative nephrolithiasis or obstructive uropathy.          CT HEAD WO CONTRAST   Final Result   No acute supportive care.  -Requested neurology consultation today.     5. Hypertension -blood pressure normal on admission. Resumed IV metoprolol 2.5 mg twice daily(as patient n.p.o.) -monitor     6. History of pacemaker -stable     7.  Elevated troponin POA -likely in the setting of SHANIA. Troponin 0.12 on admission. Continue to trend. 8.  Acute right isolated distal DVT of the peroneal vein  -as noted on bilateral venous duplex on 11/19/2020. VQ scan low probability for PE. Discontinue heparin GTT given low risk for progression to PE and risks outweigh benefits. 9.  Acute hypoxic respiratory failure likely in the setting of sepsis related hydration/Covid infection  -Rapid Covid testing on admission negative ; given worsening respiratory failure Covid PCR sent by pulmonology on 11/20/2020 resulted positive. Continue droplet plus isolation  -Currently requiring BiPAP. Follow-up chest x-ray on 11/20/2020 with mild vascular congestion otherwise no worsening infiltrates. -Echocardiogram obtained on 11/20/2020 s/o PFO/ASD; normal LVEF 55 to 60% with no regional wall motion abnormalities. -Pulmonology/critical care consulted and assisting  with management    10. Right sacral/buttock pressure ulcer with eschar POA -noted by wound care on 11/19/2020; recommended Santyl application and surgical consult for possible wound debridement  -General surgery evaluated and discussed with patient's daughter -who stated patient's wishes to be no intubation. Anesthesia agreeable. Plan to continue conservative management at this time. Will follow     DVT Prophylaxis: Heparin  SQ  Diet: DIET TUBE FEED CONTINUOUS/CYCLIC NPO; 1.5 Calorie with Fiber; Nasogastric; Continuous; 20; 60  Code Status: DNR-CCA    PT/OT Eval Status: Not yet ordered    Dispo -patient's condition slightly better today. .  Discussed with daughter/HC QUYNH Copeland face-to-face for more than 30 minutes on 11/20/2020. CODE STATUS amended to DNR CCA.   We will continue ongoing EOL discussions . The note was completed using Dragon -speech recognition software & EMR  . Every effort was made to ensure accuracy; however, inadvertent computerized transcription errors may be present.     Benitez Butt MD

## 2020-11-23 NOTE — PROGRESS NOTES
Patient was placed on tube feed via NG tube at 1910. Upon reviewing order this RN saw that there wasn't a goal rate set nor was there a note from dietary. This RN kept feed rate at 20mL/hr per initial rate.

## 2020-11-23 NOTE — PROGRESS NOTES
Comprehensive Nutrition Assessment    Type and Reason for Visit:  Initial, Consult(tube feeding ordering and management)    Nutrition Recommendations/Plan:   1. Jevity 1.5 currently at 20 ml/hr per MD order, increase by 10 ml every 4 hours to goal rate of 60 ml/hr. 2.  Water flush 200 ml every 4 hours to meet fluid needs and help correct some of water deficit; monitor IV fluid adjustments per nephrology  3. Will monitor TF intake and tolerance, blood sugar trends, and fluid and electrolyte balances  4. Monitor medical stability and plan of care    Nutrition Assessment:  Patient admitted with sepsis, uti. NG placed yesterday evening due to decreased poor po intake this admission. Jevity 1.5 started last night at 20 ml/hr. Dietitian consult ordered 11/22, received this am.  Na 149 mmol/L this am, 3.2 liter water deficit. IV fluids changed to D5 from lacted ringers by nephrology this am.  Water flush ordered per  ml every 4 hours to meet fluid needs. Will continue to monitor. Malnutrition Assessment:  Malnutrition Status:   At risk for malnutrition (Comment)(decreased po intake this admission)      Estimated Daily Nutrient Needs:  Energy (kcal):  4590-5755; Weight Used for Energy Requirements:  Admission(80.9 kg)     Protein (g):  66-79; Weight Used for Protein Requirements:  Ideal(1.5-1.8)        Fluid (ml/day):  may need increased to correct water deficit; Method Used for Fluid Requirements:         Nutrition Related Findings:  BM x 2 on 11/22      Wounds:  (scattered bruising and abrasions)       Current Nutrition Therapies:    DIET TUBE FEED CONTINUOUS/CYCLIC NPO; 1.5 Calorie with Fiber; Nasogastric; Continuous; 20; 60    Anthropometric Measures:  · Height: 4' 10\" (147.3 cm)  · Current Body Weight: 185 lb 8 oz (84.1 kg)   · Admission Body Weight: 178 lb 8 oz (81 kg)    · Ideal Body Weight: 90 lbs; % Ideal Body Weight     · BMI: 38.8  · BMI Categories: Obese Class 2 (BMI 35.0 -39.9)

## 2020-11-23 NOTE — CARE COORDINATION
Patient COVID positive and now on vapotherm. Due to day #6 and potential long length of stay, placed call to Becki Gilman with [facility] and requested that she screen patient. She states she will screen and call back on whether or not she meets criteria and if Singh Jurado will approve.

## 2020-11-23 NOTE — PROGRESS NOTES
Standard without fiber tube feed started at ordered rate 20mL/hr through NG tube to R nare.   Cortney Leeanna  11/22/2020

## 2020-11-23 NOTE — ADT AUTH CERT
COVID by Anette Ziegler RN         Review Status  Review Entered    In Primary  11/23/2020 08:28        Criteria Review    The illness suspected to be related to the Coronavirus (COVID-19)? Yes  Has the member been tested for the COVID-19? Yes  If Yes, what are the results of the COVID-19? Positive  1-     SARS-CoV-2, PCR  DETECTED      What is the severity of the members condition ( Isolation, BiPAP, HFNC use)          Urinary Tract Infection (UTI) - Care Day 6 (11/22/2020) by Anette Ziegler RN         Review Status  Review Entered    Completed  11/23/2020 08:20        Criteria Review       Care Day: 6 Care Date: 11/22/2020 Level of Care:    Guideline Day 2    Clinical Status    (X) * Hypotension absent    ( ) * Mental status at baseline    (X) * Afebrile or fever improved    (X) * Vomiting absent or improved    Activity    ( ) * Ambulatory    Routes    (X) IV fluids, medications    Interventions    ( ) * Reversible urinary system abnormality (eg, obstruction, abscess) absent, addressed, or to be treated at next level of care    (X) WBC    (X) Renal function tests    Medications    (X) Antibiotics    * Milestone    Additional Notes    11-          ** ** ** ** ** NOTE      Reporting minimal improvement mentation wise.  Continue to be intermittent BiPAP.  W/d/w pulmonology regarding transitioning to OptiFlow and to allow NG tube placement and initiation of nutrition.          -  Her condition continued to be critical with guarded prognosis at the best.         /78   Pulse 62   Temp 96.8 °F (36 °C) (Axillary)   Resp 19   Ht 4' 10\" (1.473 m)   Wt 181 lb 6.4 oz (82.3 kg)   SpO2 96% HHFNC @ 95% Fi02 @ 40 Lpm   BMI 37.91 kg/m²          1/22/2020 05:16    WBC: 15.2 (H)       IM NOTE    -Noted renal functions improving.  Received call from neurologist this morning stating that he would be in to assess patient tomorrow and after reviewing case, recommended no further testing at this time. 11/22/2020 05:16    BUN: 73 (H)    Creatinine: 1.7 (H)    GFR Non-: 29 (A)    Glucose: 151 (H)          ** ** ** ** INTERNAL MEDICINE       Assessment/Plan:    1.  Sepsis due to Klebsiella urinary tract infection POA -clinically sepsis syndrome resolved    -As evidenced by leukocytosis, lactic acidosis, abnormal urine analysis. -Ordered 30 mils per KG fluid bolus in ED; repeat lactic; continue IV hydration.    -Empirically started on Rocephin and azithromycin ED; urine cultures showed 75K Klebsiella sensitive to Rocephin-continue         2.  Acute kidney injury -BUN/creatinine-181/5.8 on admission -improving    -Likely due to severe dehydration in addition to sepsis POA    -CT abdomen pelvis negative for nephrolithiasis and obstructive uropathy.    -Continue aggressive IV hydration and anticipate improvement.  Strict I's/O monitoring.  Monitor BMP.     -Nephrology consulted from ED -evaluated with plans to continue IV hydration at this time and monitor her progress.         3.  Acute hypernatremia -sodium 149 on admission consistent with dehydration.  Ongoing    -Nephrology following and assisting with IV fluids .  monitor BMP.         4.  Acute metabolic encephalopathy -likely due to above -ongoing    -CT head negative for acute intracranial abnormality and CT C-spine negative for fracture.    -Continue neurochecks and supportive care.    -Requested neurology consultation today.         5.  Hypertension -blood pressure normal on admission.    Resumed IV metoprolol 2.5 mg twice daily(as patient n.p.o.) -monitor         6.  History of pacemaker -stable         7.  Elevated troponin POA -likely in the setting of SHANIA.  Troponin 0.12 on admission.  Continue to trend.         8.  Acute right isolated distal DVT of the peroneal vein -as noted on bilateral venous duplex on 11/19/2020. Daya David scan low probability for PE.  Discontinue heparin GTT given low risk for progression to PE and risks outweigh benefits.         9.  Acute hypoxic respiratory failure likely in the setting of sepsis related hydration/Covid infection    -Rapid Covid testing on admission negative ; given worsening respiratory failure Covid PCR sent by pulmonology on 11/20/2020 resulted positive.  Continue droplet plus isolation    -Currently requiring BiPAP.  Follow-up chest x-ray on 11/20/2020 with mild vascular congestion otherwise no worsening infiltrates. -Echocardiogram obtained on 11/20/2020 s/o PFO/ASD; normal LVEF 55 to 60% with no regional wall motion abnormalities.     -Pulmonology/critical care consulted and assisting  with management         10.  Right sacral/buttock pressure ulcer with eschar POA -noted by wound care on 11/19/2020; recommended Santyl application and surgical consult for possible wound debridement    -General surgery evaluated and discussed with patient's daughter -who stated patient's wishes to be no intubation.  Anesthesia agreeable.  Plan to continue conservative management at this time.  Will follow          DVT Prophylaxis: Heparin  SQ    Diet: Diet NPO Effective Now    Code Status: DNR-CCA         11- @ 1627    NGT placed to R nare, marked at 56cm, pt tolerated well, O2 sats maintained 96% and greater, KUB ordered to confirm placement.         1910    Standard without fiber tube feed started at ordered rate 20mL/hr through NG tube to R nare.            MEDS:    K+ 20 mEq in D5 @ 100ml/H chg to D5 in LR  @ 75ML/h    methylPREDNISolone, 40 mg, Intravenous, Q6H    ·  heparin (porcine), 5,000 Units, Subcutaneous, 3 times per day    ·  [COMPLETED] remdesivir IVPB, 200 mg, Intravenous, Once **FOLLOWED BY** remdesivir IVPB, 100 mg, Intravenous, Q24H    ·  atorvastatin, 40 mg, Oral, Nightly    ·  metoprolol, 2.5 mg, Intravenous, BID    ·  collagenase, , Topical, Daily

## 2020-11-24 PROBLEM — U07.1 ACUTE HYPOXEMIC RESPIRATORY FAILURE DUE TO SEVERE ACUTE RESPIRATORY SYNDROME CORONAVIRUS 2 (SARS-COV-2) DISEASE (HCC): Status: ACTIVE | Noted: 2020-01-01

## 2020-11-24 PROBLEM — E87.0 HYPERNATREMIA: Status: ACTIVE | Noted: 2020-01-01

## 2020-11-24 PROBLEM — R79.9 ELEVATED BUN: Status: ACTIVE | Noted: 2020-01-01

## 2020-11-24 PROBLEM — R91.8 PULMONARY INFILTRATES: Status: ACTIVE | Noted: 2020-01-01

## 2020-11-24 PROBLEM — T17.908A ASPIRATION INTO AIRWAY: Status: ACTIVE | Noted: 2020-01-01

## 2020-11-24 PROBLEM — R73.9 HYPERGLYCEMIA: Status: ACTIVE | Noted: 2020-01-01

## 2020-11-24 PROBLEM — J96.01 ACUTE HYPOXEMIC RESPIRATORY FAILURE DUE TO SEVERE ACUTE RESPIRATORY SYNDROME CORONAVIRUS 2 (SARS-COV-2) DISEASE (HCC): Status: ACTIVE | Noted: 2020-01-01

## 2020-11-24 NOTE — PROGRESS NOTES
INPATIENT PULMONARY CRITICAL CARE PROGRESS NOTE      Reason for visit    Acute respiratory failure     SUBJECTIVE: Patient's NGT came out half way  last night and patient had an aspiration event and large amounts of NGT feed had to be deep suctioned; continues to be significantly hypoxemic when evaluated this morning, patient was on 40 L of flow rate with saturation of 94% on 70% FiO2 on Vapotherm when evaluated , patient was afebrile, patient's blood pressure was slightly on the higher side, patient's blood sugars were better controlled, patient has atrial fibrillation with controlled ventricular rate on the monitor, patient documented urine output is better with documented cumulative fluid balance of +2.2 L ,, no other pertinent review of system of concern          Physical Exam:  Blood pressure (!) 180/89, pulse 62, temperature 98.2 °F (36.8 °C), temperature source Axillary, resp. rate 20, height 4' 10\" (1.473 m), weight 184 lb 4.8 oz (83.6 kg), SpO2 94 %.'   Constitutional:  No acute distress. HENT:  Oropharynx is clear and moist. No thyromegaly. Eyes:  Conjunctivae are normal. Pupils equal, round, and reactive to light. No scleral icterus. Neck: . No tracheal deviation present. No obvious thyroid mass. Cardiovascular: Normal rate, regular rhythm, normal heart sounds. No right ventricular heave. No lower extremity edema. Pulmonary/Chest: No wheezes. Basilar rales. Chest wall is not dull to percussion. No accessory muscle usage or stridor. Abdominal: Soft. Bowel sounds present. No distension or hernia. No tenderness. Musculoskeletal: No cyanosis. No clubbing. No obvious joint deformity. Lymphadenopathy: No cervical or supraclavicular adenopathy. Skin: Skin is warm and dry. No rash or nodules on the exposed extremities. Psychiatric: Normal mood and affect. Behavior is normal.  No anxiety. Neurologic: Alert, awake and oriented. PERRL.   Speech fluent  (deferred)      Results:  CBC:   Recent Labs     11/22/20  0516 11/23/20  0534 11/24/20  0535   WBC 15.2* 12.4* 10.7   HGB 11.9* 11.6* 11.8*   HCT 37.8 35.8* 36.1   MCV 97.9 95.9 95.8    198 187     BMP:   Recent Labs     11/22/20  0516 11/23/20  0534 11/24/20  0535    149* 151*   K 4.3  4.3 3.8  3.7 3.9  3.9    115* 115*   CO2 23 25 24   PHOS 4.7 3.5 3.2   BUN 73* 72* 68*   CREATININE 1.7* 1.4* 1.2     LIVER PROFILE:   Recent Labs     11/22/20  0516 11/24/20  0535   AST 18 18   ALT 8* 10   BILIDIR  --  <0.2   BILITOT <0.2 0.4   ALKPHOS 95 76       Imaging:  I have reviewed radiology images personally. XR CHEST PORTABLE   Final Result   Moderate bibasilar pulmonary opacities consistent with acute aspiration   pneumonia. XR ABDOMEN (KUB) (SINGLE AP VIEW)   Final Result   NG tube coiled in the fundus of the stomach. Mild small bowel ileus or   possible distal obstruction. XR CHEST PORTABLE   Final Result   Ongoing basilar opacities, at least in part reflecting atelectasis given   shallow inspiratory effort although mild aspiration sequela could be   superimposed. XR ABDOMEN (KUB) (SINGLE AP VIEW)   Final Result   Enteric tube in expected position. XR CHEST PORTABLE   Final Result   Left-sided dual lead pacemaker/ICD seen in place. Ill-defined left lower lobe opacity seen. No pneumothorax. Recommend   comparison with clinical exam and follow-up films. NM LUNG VENT/PERFUSION (VQ)   Final Result   Low probability for pulmonary embolus. VL Extremity Venous Bilateral   Final Result      XR CHEST PORTABLE   Final Result   Bibasilar airspace disease not significantly changed. CT ABDOMEN PELVIS WO CONTRAST   Final Result   No acute inflammatory process or bowel obstruction. Negative nephrolithiasis or obstructive uropathy. CT HEAD WO CONTRAST   Final Result   No acute intracranial abnormality. CT CERVICAL SPINE WO CONTRAST   Final Result   .    No acute abnormality of the cervical spine. Moderate cervical spondylosis. Moderate to severe facet arthropathy. Prior fusion of C5 and C6. No   significant change from the prior study. XR WRIST LEFT (MIN 3 VIEWS)   Final Result   No acute fracture. Mild soft tissue swelling. Degenerative changes. XR CHEST PORTABLE   Final Result   Interval development increase interstitial markings bilaterally when compared   to October 5, 2020. Patchy appearance infiltrate specially the left raising   the possibly of infiltrates             Results for Rochelle Lopez (MRN [id number]) as of 11/24/2020 21:16   Ref.  Range 11/23/2020 05:34 11/23/2020 08:47 11/23/2020 11:02 11/23/2020 16:11 11/23/2020 19:17 11/23/2020 21:00 11/24/2020 00:07 11/24/2020 05:35 11/24/2020 05:35 11/24/2020 08:31   Sodium Latest Ref Range: 136 - 145 mmol/L 149 (H)        151 (H)    Potassium Latest Ref Range: 3.5 - 5.1 mmol/L 3.7       3.9 3.9    Chloride Latest Ref Range: 99 - 110 mmol/L 115 (H)        115 (H)    CO2 Latest Ref Range: 21 - 32 mmol/L 25        24    BUN Latest Ref Range: 7 - 20 mg/dL 72 (H)        68 (H)    Creatinine Latest Ref Range: 0.6 - 1.2 mg/dL 1.4 (H)        1.2    Anion Gap Latest Ref Range: 3 - 16  9        12    GFR Non- Latest Ref Range: >60  36 (A)        43 (A)    GFR  Latest Ref Range: >60  44 (A)        52 (A)    Glucose Latest Ref Range: 70 - 99 mg/dL 159 (H)        136 (H)    POC Glucose Latest Ref Range: 70 - 99 mg/dl  174 (H) 170 (H) 193 (H)  167 (H) 153 (H)   113 (H)   Calcium Latest Ref Range: 8.3 - 10.6 mg/dL 9.1        8.8    Phosphorus Latest Ref Range: 2.5 - 4.9 mg/dL        3.2     Total Protein Latest Ref Range: 6.4 - 8.2 g/dL         5.9 (L)    Procalcitonin Latest Ref Range: 0.00 - 0.15 ng/mL     0.19 (H)        Albumin Latest Ref Range: 3.4 - 5.0 g/dL        2.2 (L) 2.3 (L)    Alk Phos Latest Ref Range: 40 - 129 U/L         76    ALT Latest Ref Range: 10 rule out RWMA. LV systolic function is vigorous with a visually estimated EF of 60-65%. No obvious segmental wall motion abnormalities. The left ventricle is small in size with moderate concentric hypertrophy. Type I diastolic dysfunction with normal LV filling pressure. The right atrium is mildly enlarged. Lipomatous hypertrophy of interatial septum with marked atrial septal   aneurysm. A bubble study was performed with complete opacification of left sided   chambers with couple beats c/w possible significant ASD or PFO. Recommend   JO for better evaluation. Results for Alice Espinal (MRN [id number]) as of 11/24/2020 00:59   Ref. Range 11/20/2020 14:30   SARS-CoV-2, PCR Latest Ref Range: Not Detected  DETECTED (A)   COVID-19 Unknown Rpt (A)     Results for Alice Espinal (MRN [id number]) as of 11/24/2020 00:59   Ref. Range 11/17/2020 09:47 11/17/2020 12:14   Lactic Acid Latest Ref Range: 0.4 - 2.0 mmol/L 2.3 (H) 1.6     Assessment:  Active Problems:    Acute encephalopathy    Acute kidney injury ([address]    COVID-19    Acute hypoxemic respiratory failure due to severe acute respiratory syndrome coronavirus 2 (SARS-CoV-2) disease (HCC)    Pulmonary infiltrates    Hypernatremia    Hyperglycemia    Elevated BUN    Aspiration into airway  Resolved Problems:    * No resolved hospital problems.  *          Plan:   · Oxygen supplementation to keep saturation between 90 to 94% only  · Patient has been on high flow oxygen via Vapotherm at this time  · Patient had an aspiration event and if spiking fever/worsening leukocytosis -IV Unasyn can be started   · Pulmonary toilet  · IV fluids have been started and titration as per clinical status and blood work for hypernatremia /hypokalemia  · If NGT is reinserted -free water can be started   · Patient is on IV Solu-Medrol which can be continued  · Patient may require blood glucose monitoring with sliding scale  insulin  · Patient has been started on

## 2020-11-24 NOTE — PROGRESS NOTES
1232:  Perfect serve to Dr. Jackson Horse:  KUB showed \"mild small bowel ileus or possible distal obstruction\" & CXR showed \"moderate bibasilar pulmonary opacities consistent with acute aspiration pneumonia\". Ok to start TF with possible obstruction? Continue to monitor resp status? Please advise. Thank you    [phone]:  Dr. Kati Lake returned call. Hold on restarting TF. Okay to continue free water flushes 200 ml q4h & give meds through NG.

## 2020-11-24 NOTE — PROGRESS NOTES
RN called to bedside. Patient aspirating on tube feeds. NTS patient for a large amount of thick tan.  Fi02 increased 90%

## 2020-11-24 NOTE — PROGRESS NOTES
[facility]:    Thank you for the opportunity to evaluate this patient and meet with patient and family to discuss hospice philosophy and services. Meeting time: 11/25/2020 at 1030pm phone conference  JACKELYN Ortiz updated.

## 2020-11-24 NOTE — PROGRESS NOTES
11/23/20 2147   Oxygen Therapy/Pulse Ox   O2 Therapy Oxygen humidified   O2 Device Heated high flow cannula   O2 Flow Rate (L/min) 40 L/min   FiO2  90 %   SpO2 (!) 89 %

## 2020-11-24 NOTE — PROGRESS NOTES
11/24/20 0333   Oxygen Therapy/Pulse Ox   O2 Device High flow nasal cannula   O2 Flow Rate (L/min) 40 L/min   FiO2  70 %   SpO2 99 %

## 2020-11-24 NOTE — PROGRESS NOTES
11/24/20 1539   Oxygen Therapy/Pulse Ox   O2 Therapy Oxygen humidified   O2 Device Heated high flow cannula   O2 Flow Rate (L/min) 30 L/min   FiO2  60 %   Resp 18   SpO2 97 %   Skin Protection for O2 Device Yes

## 2020-11-24 NOTE — PROGRESS NOTES
INPATIENT PULMONARY CRITICAL CARE PROGRESS NOTE      Reason for visit    Acute respiratory failure     SUBJECTIVE: Patient continues to be significantly hypoxemic when evaluated this morning, patient was on 40 L of flow rate with saturation of 98% on 70% FiO2 on Vapotherm, patient was afebrile, patient's blood pressure was slightly on the higher side, patient's blood sugars were not optimally controlled, patient has atrial fibrillation with controlled ventricular rate on the monitor, patient documented urine output is on the lower side, patient's confusion and mental status remains on the lower side, patient has been started on enteral feeds, no other pertinent review of system of concern          Physical Exam:  Blood pressure (!) 167/90, pulse 60, temperature 97.7 °F (36.5 °C), temperature source Oral, resp. rate 22, height 4' 10\" (1.473 m), weight 185 lb 8 oz (84.1 kg), SpO2 98 %.'   Constitutional:  No acute distress. HENT:  Oropharynx is clear and moist. No thyromegaly. Eyes:  Conjunctivae are normal. Pupils equal, round, and reactive to light. No scleral icterus. Neck: . No tracheal deviation present. No obvious thyroid mass. Cardiovascular: Normal rate, regular rhythm, normal heart sounds. No right ventricular heave. No lower extremity edema. Pulmonary/Chest: No wheezes. Basilar rales. Chest wall is not dull to percussion. No accessory muscle usage or stridor. Abdominal: Soft. Bowel sounds present. No distension or hernia. No tenderness. Musculoskeletal: No cyanosis. No clubbing. No obvious joint deformity. Lymphadenopathy: No cervical or supraclavicular adenopathy. Skin: Skin is warm and dry. No rash or nodules on the exposed extremities. Psychiatric: Normal mood and affect. Behavior is normal.  No anxiety. Neurologic: Alert, awake and oriented. PERRL.   Speech fluent  (deferred)      Results:  CBC:   Recent Labs     11/21/20  0530 11/22/20  0516 11/23/20  0534   WBC 10.8 15.2* 12.4* HGB 12.0 11.9* 11.6*   HCT 37.4 37.8 35.8*   MCV 97.9 97.9 95.9    182 198     BMP:   Recent Labs     11/21/20  0530 11/22/20  0516 11/23/20  0534   * 145 149*   K 3.4*  3.4* 4.3  4.3 3.8  3.7   * 110 115*   CO2 25 23 25   PHOS 3.6 4.7 3.5   BUN 89* 73* 72*   CREATININE 2.1* 1.7* 1.4*     LIVER PROFILE:   Recent Labs     11/22/20  0516   AST 18   ALT 8*   BILITOT <0.2   ALKPHOS 95       Imaging:  I have reviewed radiology images personally. XR CHEST PORTABLE   Final Result   Ongoing basilar opacities, at least in part reflecting atelectasis given   shallow inspiratory effort although mild aspiration sequela could be   superimposed. XR ABDOMEN (KUB) (SINGLE AP VIEW)   Final Result   Enteric tube in expected position. XR CHEST PORTABLE   Final Result   Left-sided dual lead pacemaker/ICD seen in place. Ill-defined left lower lobe opacity seen. No pneumothorax. Recommend   comparison with clinical exam and follow-up films. NM LUNG VENT/PERFUSION (VQ)   Final Result   Low probability for pulmonary embolus. VL Extremity Venous Bilateral   Final Result      XR CHEST PORTABLE   Final Result   Bibasilar airspace disease not significantly changed. CT ABDOMEN PELVIS WO CONTRAST   Final Result   No acute inflammatory process or bowel obstruction. Negative nephrolithiasis or obstructive uropathy. CT HEAD WO CONTRAST   Final Result   No acute intracranial abnormality. CT CERVICAL SPINE WO CONTRAST   Final Result   . No acute abnormality of the cervical spine. Moderate cervical spondylosis. Moderate to severe facet arthropathy. Prior fusion of C5 and C6. No   significant change from the prior study. XR WRIST LEFT (MIN 3 VIEWS)   Final Result   No acute fracture. Mild soft tissue swelling. Degenerative changes.          XR CHEST PORTABLE   Final Result   Interval development increase interstitial markings bilaterally when compared   to October 5, 2020. Patchy appearance infiltrate specially the left raising   the possibly of infiltrates             Results for Fatimah Lucero (MRN [id number]) as of 11/24/2020 00:59   Ref. Range 11/23/2020 05:34 11/23/2020 08:47 11/23/2020 11:02 11/23/2020 16:11 11/23/2020 19:17 11/23/2020 21:00   Sodium Latest Ref Range: 136 - 145 mmol/L 149 (H)        Potassium Latest Ref Range: 3.5 - 5.1 mmol/L 3.7        Chloride Latest Ref Range: 99 - 110 mmol/L 115 (H)        CO2 Latest Ref Range: 21 - 32 mmol/L 25        BUN Latest Ref Range: 7 - 20 mg/dL 72 (H)        Creatinine Latest Ref Range: 0.6 - 1.2 mg/dL 1.4 (H)        Anion Gap Latest Ref Range: 3 - 16  9        GFR Non- Latest Ref Range: >60  36 (A)        GFR  Latest Ref Range: >60  44 (A)        Glucose Latest Ref Range: 70 - 99 mg/dL 159 (H)        POC Glucose Latest Ref Range: 70 - 99 mg/dl  174 (H) 170 (H) 193 (H)  167 (H)   Calcium Latest Ref Range: 8.3 - 10.6 mg/dL 9.1        Procalcitonin Latest Ref Range: 0.00 - 0.15 ng/mL     0.19 (H)      Results for Fatimah Lucero (MRN [id number]) as of 11/24/2020 00:59   Ref.  Range 11/21/2020 05:30 11/22/2020 05:16 11/23/2020 05:34   WBC Latest Ref Range: 4.0 - 11.0 K/uL 10.8 15.2 (H) 12.4 (H)   RBC Latest Ref Range: 4.00 - 5.20 M/uL 3.82 (L) 3.86 (L) 3.73 (L)   Hemoglobin Quant Latest Ref Range: 12.0 - 16.0 g/dL 12.0 11.9 (L) 11.6 (L)   Hematocrit Latest Ref Range: 36.0 - 48.0 % 37.4 37.8 35.8 (L)   MCV Latest Ref Range: 80.0 - 100.0 fL 97.9 97.9 95.9   MCH Latest Ref Range: 26.0 - 34.0 pg 31.5 30.7 31.0   MCHC Latest Ref Range: 31.0 - 36.0 g/dL 32.2 31.4 32.3   MPV Latest Ref Range: 5.0 - 10.5 fL 9.4 9.8 8.9   RDW Latest Ref Range: 12.4 - 15.4 % 14.8 14.7 14.4   Platelet Count Latest Ref Range: 135 - 450 K/uL 187 182 198   Neutrophils % Latest Units: % 76.0 92.9 93.2   Lymphocyte % Latest Units: % 10.0 3.2 2.1   Monocytes % Latest Units: % 7.0 3.1 4.4 Results for Emir Shankar (MRN [id number]) as of 11/24/2020 00:59   Ref. Range 11/19/2020 03:44 11/19/2020 10:14 11/19/2020 18:55 11/20/2020 01:35 11/22/2020 05:16   aPTT Latest Ref Range: 24.2 - 36.2 sec 29.0 113.7 (HH) 68.0 (H) 58.3 (H)    D-Dimer, Quant Latest Ref Range: 0 - 229 ng/mL DDU 1016 (H)    1557 (H)     ONE XRAY VIEW OF THE CHEST         11/20/2020 11:13 am         COMPARISON:    November 19, 2020         HISTORY:    ORDERING SYSTEM PROVIDED HISTORY: oxygen needs    TECHNOLOGIST PROVIDED HISTORY:    Reason for exam:->oxygen needs    Reason for Exam: increased oxygen needs    Acuity: Acute    Type of Exam: Initial         FINDINGS:    Again seen is a left-sided dual lead pacemaker/ICD seen in place.         Lungs demonstrate an ill-defined left lower lobe opacity seen.  The cardiac    silhouette is magnified.  There are no pleural effusions.  There is no    pneumothorax.              Impression    Left-sided dual lead pacemaker/ICD seen in place.         Ill-defined left lower lobe opacity seen.  No pneumothorax.  Recommend    comparison with clinical exam and follow-up films. ECHO-Summary   Technically difficult examination due to patient immobility. No parasternal   or subcostal windows available. Limited study to rule out RWMA. LV systolic function is vigorous with a visually estimated EF of 60-65%. No obvious segmental wall motion abnormalities. The left ventricle is small in size with moderate concentric hypertrophy. Type I diastolic dysfunction with normal LV filling pressure. The right atrium is mildly enlarged. Lipomatous hypertrophy of interatial septum with marked atrial septal   aneurysm. A bubble study was performed with complete opacification of left sided   chambers with couple beats c/w possible significant ASD or PFO. Recommend   JO for better evaluation. Results for Emir Shankar (MRN [id number]) as of 11/24/2020 00:59   Ref.  Range 11/20/2020 14:30   SARS-CoV-2, PCR Latest Ref Range: Not Detected  DETECTED (A)   COVID-19 Unknown Rpt (A)     Results for Poncho Bauer (MRN [id number]) as of 11/24/2020 00:59   Ref. Range 11/17/2020 09:47 11/17/2020 12:14   Lactic Acid Latest Ref Range: 0.4 - 2.0 mmol/L 2.3 (H) 1.6     Assessment:  Active Problems:    Acute encephalopathy    Acute kidney injury ([address]    COVID-19    Acute hypoxemic respiratory failure due to severe acute respiratory syndrome coronavirus 2 (SARS-CoV-2) disease (HCC)    Pulmonary infiltrates    Hypernatremia    Hyperglycemia    Elevated BUN  Resolved Problems:    * No resolved hospital problems.  *          Plan:   · Oxygen supplementation to keep saturation between 90 to 94% only  · Patient has been on high flow oxygen via Vapotherm at this time  · Please monitor for any worsening respiratory compromise or hypoxemia  · Patient is a DNR CC arrest but not DNI which needs to be clarified as patient has high potential for further decompensation and to be on mechanical ventilatory support  · Pulmonary toilet  · IV fluids have been started and titration as per clinical status and blood work  · Patient is on IV Solu-Medrol which can be continued  · Patient may require blood glucose monitoring with sliding scale  insulin  · Patient has been started on remdesivir as per ID  · Monitor input output and BMP  · Correct electrolytes and whenever necessary basis  · Neurochecks  · Neurology evaluations  · Patient has a wound on the buttocks as per the wound care team management to be continued   · Enteral feeds as per metabolic support  · PUD and DVT prophylaxis as per IM    Patient's clinical status is precarious and has potential for decompensation needs to monitor closely  Patient's CODE STATUS needs to be clarified        Electronically signed by:  David Murrieta MD    11/24/2020    1:04 AM.

## 2020-11-24 NOTE — PROGRESS NOTES
Nephrology Progress Note   [email or web address]      This patient is a 79year old female whom we are following for SHANIA. Subjective:  Na up to 151  BP up  Her NGT came off and iv line was not running so both her free water and iv d5w couldn't be given     Family History: No family at bedside. ROS:No fever      Vitals:  BP (!) 179/75   Pulse 60   Temp 97.6 °F (36.4 °C) (Oral)   Resp 20   Ht 4' 10\" (1.473 m)   Wt 184 lb 4.8 oz (83.6 kg)   SpO2 92%   BMI 38.52 kg/m²   I/O last 3 completed shifts: In: 450 [NG/GT:450]  Out: 1025 [Urine:1025]  No intake/output data recorded. Physical Exam:  Deferred d/t to covid-19 pandemic to preserve PPEs and avoid exposure. Medications:   methylPREDNISolone  40 mg Intravenous Q6H    heparin (porcine)  5,000 Units Subcutaneous 3 times per day    remdesivir IVPB  100 mg Intravenous Q24H    atorvastatin  40 mg Oral Nightly    metoprolol  2.5 mg Intravenous BID    collagenase   Topical Daily    sodium chloride flush  10 mL Intravenous 2 times per day         Labs:  Recent Labs     11/22/20  0516 11/23/20  0534 11/24/20  0535   WBC 15.2* 12.4* 10.7   HGB 11.9* 11.6* 11.8*   HCT 37.8 35.8* 36.1   MCV 97.9 95.9 95.8    198 187     Recent Labs     11/22/20  0516 11/23/20  0534 11/24/20  0535    149* 151*   K 4.3  4.3 3.8  3.7 3.9  3.9    115* 115*   CO2 23 25 24   GLUCOSE 151* 159* 136*   PHOS 4.7 3.5 3.2   MG  --  1.70*  --    BUN 73* 72* 68*   CREATININE 1.7* 1.4* 1.2   LABGLOM 29* 36* 43*   GFRAA 35* 44* 52*           Assessment/Plan:    Acute Kidney Injury.  - Likely from dehydration +/- ATN. - Urinalysis with trace of protein and blood, +LE, bacteria suggestive of UTI.  - CT of A/P did not show any hydronephrosis. - Kidney function is improving w IVF  - Please avoid any nephrotoxic agents such as NSAIDs or IV contrast unless deemed necessary.  - Renal function panel daily.     Hypernatremia.  - From decreased PO fluid intake/dehydration.  Not polyuric.  - cont IVF to D5 + 20meq of KCl at 75cc/hr.  -cont free water flushes 200 ml q4h as ordered    Hypokalemia/Hypomagnesemia.  - Improved with supplementation.     Metabolic Acidosis. - From lactic acid and SHANIA. - Improved with bicarb drip.     Acute Hypoxic Respiratory Failure/COVID 19 Infection.  - Pulmonary and ID following.  - On steroid and Remdesivir. Thank you for allowing me to participate in the care of this patient. Please do not hesitate to contact me at [phone] if with questions. Please do not hesitate to contact me at [phone] if with questions. Thank you!     America Johnson MD  11/24/2020  [facility]

## 2020-11-24 NOTE — PROGRESS NOTES
11/23/20 8063   Oxygen Therapy/Pulse Ox   O2 Device Heated high flow cannula   O2 Flow Rate (L/min) 40 L/min   FiO2  80 %   SpO2 98 %

## 2020-11-24 NOTE — PROGRESS NOTES
Perfect serve to Jazmine JHAVERI NP:  \"Patient had tube feeds running at 40mL/hr. My assessment complete at 2115. Patient NG tube was at 25 at the nare- supposed to be at 64. Patient SPO2 at 85%. RT at bedside to deep suction with large amounts of tube feed out. \"      2443:  Perfect served Adan Guy, NP:  \"I was unable to reinsert NG tube to proper placement. Do you want a new one placed? \"

## 2020-11-24 NOTE — PROGRESS NOTES
Hospitalist Progress Note      PCP: Tacos Forman MD    Date of Admission: 11/17/2020    Chief Complaint: Fatigue and cough    Hospital Course: Reviewed H&P    Subjective: Patient seen as A/V visit per Covid protocol. D/w RN this morning-RN reports patient had her NG pulled out and with increasing oxygen needs concern for aspiration and respiratory has to suction a lot of tube feeds this morning. RN reports that she discussed with the family  \"who do not want any intubation if her respiratory status has to worsen \" . Changed CODE STATUS to limited. Requested RN to replace NG tube and obtain KUB. KUB  suggestive of possible aspiration pneumonia and possible ileus. RN earlier reported patient had some loose BM this morning. Will hold tube feeds for now and continue to monitor. Will resume tube feeds tomorrow if respiratory status stabilizes and patient continued to have BM. Currently on OptiFlow at 70% 40 L/min.    -Noted renal functions improving.        Medications:  Reviewed    Infusion Medications    IV infusion builder 75 mL/hr at 11/23/20 1047     Scheduled Medications    methylPREDNISolone  40 mg Intravenous Q6H    heparin (porcine)  5,000 Units Subcutaneous 3 times per day    remdesivir IVPB  100 mg Intravenous Q24H    atorvastatin  40 mg Oral Nightly    metoprolol  2.5 mg Intravenous BID    collagenase   Topical Daily    sodium chloride flush  10 mL Intravenous 2 times per day     PRN Meds: perflutren lipid microspheres, sodium chloride flush, acetaminophen **OR** acetaminophen, polyethylene glycol, promethazine **OR** ondansetron      Intake/Output Summary (Last 24 hours) at 11/24/2020 1240  Last data filed at 11/24/2020 3722  Gross per 24 hour   Intake 450 ml   Output 1025 ml   Net -575 ml       Physical Exam Performed:  BP (!) 186/93   Pulse 62   Temp 97.9 °F (36.6 °C) (Axillary)   Resp 20   Ht 4' 10\" (1.473 m)   Wt 184 lb 4.8 oz (83.6 kg)   SpO2 94%   BMI 38.52 kg/m²     I basilar opacities, at least in part reflecting atelectasis given   shallow inspiratory effort although mild aspiration sequela could be   superimposed. XR ABDOMEN (KUB) (SINGLE AP VIEW)   Final Result   Enteric tube in expected position. XR CHEST PORTABLE   Final Result   Left-sided dual lead pacemaker/ICD seen in place. Ill-defined left lower lobe opacity seen. No pneumothorax. Recommend   comparison with clinical exam and follow-up films. NM LUNG VENT/PERFUSION (VQ)   Final Result   Low probability for pulmonary embolus. VL Extremity Venous Bilateral   Final Result      XR CHEST PORTABLE   Final Result   Bibasilar airspace disease not significantly changed. CT ABDOMEN PELVIS WO CONTRAST   Final Result   No acute inflammatory process or bowel obstruction. Negative nephrolithiasis or obstructive uropathy. CT HEAD WO CONTRAST   Final Result   No acute intracranial abnormality. CT CERVICAL SPINE WO CONTRAST   Final Result   . No acute abnormality of the cervical spine. Moderate cervical spondylosis. Moderate to severe facet arthropathy. Prior fusion of C5 and C6. No   significant change from the prior study. XR WRIST LEFT (MIN 3 VIEWS)   Final Result   No acute fracture. Mild soft tissue swelling. Degenerative changes. XR CHEST PORTABLE   Final Result   Interval development increase interstitial markings bilaterally when compared   to October 5, 2020.   Patchy appearance infiltrate specially the left raising   the possibly of infiltrates             Active Hospital Problems    Diagnosis Date Noted    Acute hypoxemic respiratory failure due to severe acute respiratory syndrome coronavirus 2 (SARS-CoV-2) disease ([facility] [U07.1, J96.01] 11/24/2020    Pulmonary infiltrates [R91.8] 11/24/2020    Hypernatremia [E87.0] 11/24/2020    Hyperglycemia [R73.9] 11/24/2020    Elevated BUN [R79.9] 11/24/2020    Acute encephalopathy [G93.40]     Acute kidney injury ([facility] [address] [N17.9]     COVID-19 [U07.1]      Assessment/Plan:  1. Sepsis due to Klebsiella urinary tract infection POA -clinically sepsis syndrome resolved  -As evidenced by leukocytosis, lactic acidosis, abnormal urine analysis. -Ordered 30 mils per KG fluid bolus in ED; repeat lactic; continue IV hydration.  -Empirically started on Rocephin and azithromycin ED; urine cultures showed 75K Klebsiella sensitive to Rocephin-continue     2. Acute kidney injury -BUN/creatinine-181/5.8 on admission -resolved  -Likely due to severe dehydration in addition to sepsis POA  -CT abdomen pelvis negative for nephrolithiasis and obstructive uropathy.  -Continue aggressive IV hydration and anticipate improvement. Strict I's/O monitoring. Monitor BMP. -Nephrology consulted from ED -evaluated with plans to continue IV hydration at this time and monitor her progress.     3. Acute hypernatremia -sodium 149 on admission consistent with dehydration. Ongoing  -Nephrology following and assisting with IV fluids . monitor BMP.      4.  Acute metabolic encephalopathy -likely due to above -ongoing  -CT head negative for acute intracranial abnormality and CT C-spine negative for fracture.  -Continue neurochecks and supportive care.  -Requested neurology consultation today.     5. Hypertension -blood pressure normal on admission. Resumed IV metoprolol 2.5 mg twice daily(as patient n.p.o.) -monitor     6. History of pacemaker -stable     7.  Elevated troponin POA -likely in the setting of SHANIA. Troponin 0.12 on admission. Continue to trend. 8.  Acute right isolated distal DVT of the peroneal vein  -as noted on bilateral venous duplex on 11/19/2020. VQ scan low probability for PE. Discontinue heparin GTT given low risk for progression to PE and risks outweigh benefits.      9.  Acute hypoxic respiratory failure likely in the setting of sepsis related hydration/Covid infection  -Rapid Covid testing on admission negative ; given worsening respiratory failure Covid PCR sent by pulmonology on 11/20/2020 resulted positive. Continue droplet plus isolation  -Currently requiring BiPAP. Follow-up chest x-ray on 11/20/2020 with mild vascular congestion otherwise no worsening infiltrates. -Echocardiogram obtained on 11/20/2020 s/o PFO/ASD; normal LVEF 55 to 60% with no regional wall motion abnormalities. -Pulmonology/critical care consulted and assisting  with management  -Patient noted to have aspiration pneumonia on follow-up chest x-ray on 11/24/2020 and NG tube replaced     10. Right sacral/buttock pressure ulcer with eschar POA -noted by wound care on 11/19/2020; recommended Santyl application and surgical consult for possible wound debridement  -General surgery evaluated and discussed with patient's daughter -who stated patient's wishes to be no intubation. Anesthesia agreeable. Plan to continue conservative management at this time. Will follow     DVT Prophylaxis: Heparin  SQ  Diet: Diet NPO Effective Now  Code Status: Limited    PT/OT Eval Status: Not yet ordered    Dispo -patient's condition slightly better today. RN updated patient's family and CODE STATUS amended to limited on 11/24/2020. We will continue ongoing EOL discussions . The note was completed using Dragon -speech recognition software & EMR  . Every effort was made to ensure accuracy; however, inadvertent computerized transcription errors may be present.     Artemio Wade MD

## 2020-11-25 NOTE — PROGRESS NOTES
Unable to review instructions patient confused, no new prescriptions for pateint, IV remains in patient's left AC. Rader cathter remains in place. NG tube removed. Tele monitor removed. Patient in stable condition, discharged at this time with ambulatory care. Pt accompanied to car via stretcher to hospice.

## 2020-11-25 NOTE — PROGRESS NOTES
INPATIENT PULMONARY CRITICAL CARE PROGRESS NOTE      Reason for visit    Acute respiratory failure     SUBJECTIVE: Patient when evaluated  on 30 L of flow rate with saturation of 93% on 50% FiO2 on Vapotherm when evaluated;patient continues to be pleasantly confused  , patient was afebrile, patient's blood pressure was slightly on the higher side but better than yesterday , patient's blood sugars were better controlled, patient has atrial fibrillation with controlled ventricular rate on the monitor, patient documented urine output is better with documented cumulative fluid balance of +996 mL ,, no other pertinent review of system of concern          Physical Exam:  Blood pressure (!) 156/73, pulse 60, temperature 98.2 °F (36.8 °C), temperature source Axillary, resp. rate 18, height 4' 10\" (1.473 m), weight 185 lb 12.8 oz (84.3 kg), SpO2 95 %.'   Constitutional:  No acute distress. HENT:  Oropharynx is clear and moist. No thyromegaly. Eyes:  Conjunctivae are normal. Pupils equal, round, and reactive to light. No scleral icterus. Neck: . No tracheal deviation present. No obvious thyroid mass. Cardiovascular: Normal rate, regular rhythm, normal heart sounds. No right ventricular heave. No lower extremity edema. Pulmonary/Chest: No wheezes. Basilar rales. Chest wall is not dull to percussion. No accessory muscle usage or stridor. Abdominal: Soft. Bowel sounds present. No distension or hernia. No tenderness. Musculoskeletal: No cyanosis. No clubbing. No obvious joint deformity. Lymphadenopathy: No cervical or supraclavicular adenopathy. Skin: Skin is warm and dry. No rash or nodules on the exposed extremities. Psychiatric: Normal mood and affect. Behavior is normal.  No anxiety. Neurologic: Alert, awake and oriented. PERRL.   Speech fluent  (deferred)      Results:  CBC:   Recent Labs     11/23/20  0534 11/24/20  0535 11/25/20  0535   WBC 12.4* 10.7 8.3   HGB 11.6* 11.8* 12.1   HCT 35.8* 36.1 36.9 MCV 95.9 95.8 95.2    187 152     BMP:   Recent Labs     11/23/20  0534 11/24/20  0535 11/25/20  0535   * 151* 146*   K 3.8  3.7 3.9  3.9 3.4*  3.4*   * 115* 109   CO2 25 24 30   PHOS 3.5 3.2 3.4   BUN 72* 68* 59*   CREATININE 1.4* 1.2 1.1     LIVER PROFILE:   Recent Labs     11/24/20  0535   AST 18   ALT 10   BILIDIR <0.2   BILITOT 0.4   ALKPHOS 76       Imaging:  I have reviewed radiology images personally. XR CHEST PORTABLE   Final Result   Moderate bibasilar pulmonary opacities consistent with acute aspiration   pneumonia. XR ABDOMEN (KUB) (SINGLE AP VIEW)   Final Result   NG tube coiled in the fundus of the stomach. Mild small bowel ileus or   possible distal obstruction. XR CHEST PORTABLE   Final Result   Ongoing basilar opacities, at least in part reflecting atelectasis given   shallow inspiratory effort although mild aspiration sequela could be   superimposed. XR ABDOMEN (KUB) (SINGLE AP VIEW)   Final Result   Enteric tube in expected position. XR CHEST PORTABLE   Final Result   Left-sided dual lead pacemaker/ICD seen in place. Ill-defined left lower lobe opacity seen. No pneumothorax. Recommend   comparison with clinical exam and follow-up films. NM LUNG VENT/PERFUSION (VQ)   Final Result   Low probability for pulmonary embolus. VL Extremity Venous Bilateral   Final Result      XR CHEST PORTABLE   Final Result   Bibasilar airspace disease not significantly changed. CT ABDOMEN PELVIS WO CONTRAST   Final Result   No acute inflammatory process or bowel obstruction. Negative nephrolithiasis or obstructive uropathy. CT HEAD WO CONTRAST   Final Result   No acute intracranial abnormality. CT CERVICAL SPINE WO CONTRAST   Final Result   . No acute abnormality of the cervical spine. Moderate cervical spondylosis. Moderate to severe facet arthropathy. Prior fusion of C5 and C6.   No   significant change from the prior study. XR WRIST LEFT (MIN 3 VIEWS)   Final Result   No acute fracture. Mild soft tissue swelling. Degenerative changes. XR CHEST PORTABLE   Final Result   Interval development increase interstitial markings bilaterally when compared   to October 5, 2020. Patchy appearance infiltrate specially the left raising   the possibly of infiltrates               ECHO-Summary   Technically difficult examination due to patient immobility. No parasternal   or subcostal windows available. Limited study to rule out RWMA. LV systolic function is vigorous with a visually estimated EF of 60-65%. No obvious segmental wall motion abnormalities. The left ventricle is small in size with moderate concentric hypertrophy. Type I diastolic dysfunction with normal LV filling pressure. The right atrium is mildly enlarged. Lipomatous hypertrophy of interatial septum with marked atrial septal   aneurysm. A bubble study was performed with complete opacification of left sided   chambers with couple beats c/w possible significant ASD or PFO. Recommend   JO for better evaluation. Results for Marcio David (MRN [id number]) as of 11/24/2020 00:59   Ref. Range 11/20/2020 14:30   SARS-CoV-2, PCR Latest Ref Range: Not Detected  DETECTED (A)   COVID-19 Unknown Rpt (A)     Results for Marcio David (MRN [id number]) as of 11/25/2020 11:12   Ref.  Range 11/24/2020 05:35 11/24/2020 08:31 11/24/2020 11:08 11/24/2020 20:10 11/25/2020 00:31 11/25/2020 04:50 11/25/2020 05:35 11/25/2020 05:35   Sodium Latest Ref Range: 136 - 145 mmol/L 151 (H)       146 (H)   Potassium Latest Ref Range: 3.5 - 5.1 mmol/L 3.9      3.4 (L) 3.4 (L)   Chloride Latest Ref Range: 99 - 110 mmol/L 115 (H)       109   CO2 Latest Ref Range: 21 - 32 mmol/L 24       30   BUN Latest Ref Range: 7 - 20 mg/dL 68 (H)       59 (H)   Creatinine Latest Ref Range: 0.6 - 1.2 mg/dL 1.2       1.1   Anion Gap Latest Ref Range: 3 - 16 12       7   GFR Non- Latest Ref Range: >60  43 (A)       48 (A)   GFR  Latest Ref Range: >60  52 (A)       58 (A)   Magnesium Latest Ref Range: 1.80 - 2.40 mg/dL       1.90    Glucose Latest Ref Range: 70 - 99 mg/dL 136 (H)       119 (H)   POC Glucose Latest Ref Range: 70 - 99 mg/dl  113 (H) 122 (H) 132 (H) 138 (H) 124 (H)     Calcium Latest Ref Range: 8.3 - 10.6 mg/dL 8.8       8.0 (L)   Phosphorus Latest Ref Range: 2.5 - 4.9 mg/dL       3.4      Results for Marbella Santo (MRN [id number]) as of 11/25/2020 11:12   Ref. Range 11/23/2020 05:34 11/24/2020 05:35 11/25/2020 05:35   WBC Latest Ref Range: 4.0 - 11.0 K/uL 12.4 (H) 10.7 8.3   RBC Latest Ref Range: 4.00 - 5.20 M/uL 3.73 (L) 3.77 (L) 3.87 (L)   Hemoglobin Quant Latest Ref Range: 12.0 - 16.0 g/dL 11.6 (L) 11.8 (L) 12.1   Hematocrit Latest Ref Range: 36.0 - 48.0 % 35.8 (L) 36.1 36.9   MCV Latest Ref Range: 80.0 - 100.0 fL 95.9 95.8 95.2   MCH Latest Ref Range: 26.0 - 34.0 pg 31.0 31.4 31.3   MCHC Latest Ref Range: 31.0 - 36.0 g/dL 32.3 32.8 32.9   MPV Latest Ref Range: 5.0 - 10.5 fL 8.9 8.9 9.1   RDW Latest Ref Range: 12.4 - 15.4 % 14.4 14.2 13.9   Platelet Count Latest Ref Range: 135 - 450 K/uL 198 187 152   Neutrophils % Latest Units: % 93.2 91.9 90.6   Lymphocyte % Latest Units: % 2.1 3.2 4.1   Monocytes % Latest Units: % 4.4 4.8 5.1           Assessment:  Active Problems:    Acute encephalopathy    Acute kidney injury (HCC)    COVID-19    Acute hypoxemic respiratory failure due to severe acute respiratory syndrome coronavirus 2 (SARS-CoV-2) disease (HCC)    Pulmonary infiltrates    Hypernatremia    Hyperglycemia    Elevated BUN    Aspiration into airway  Resolved Problems:    * No resolved hospital problems.  *          Plan:   · Oxygen supplementation to keep saturation between 90 to 94% only  · Patient has been on high flow oxygen via Vapotherm at this time but gradually improving   · Patient had an aspiration event and if spiking fever/worsening leukocytosis -IV Unasyn can be started   · Pulmonary toilet  · IV fluids have been started and titration as per clinical status and blood work for hypernatremia /hypokalemia-improving   · Patient is on IV Solu-Medrol which can be continued  · Patient may require blood glucose monitoring with sliding scale  insulin  · Patient has been started on remdesivir as per ID  · Monitor input output and BMP  · Correct electrolytes and whenever necessary basis  · Neurochecks  · Patient has a wound on the buttocks as per the wound care team management to be continued   · Enteral feeds and free water as per metabolic support  · PUD and DVT prophylaxis as per IM      Patient's CODE STATUS has been changed to limited code with no intubation             Electronically signed by:  Sol Mcburney, MD    11/25/2020    11:09 AM.

## 2020-11-25 NOTE — PROGRESS NOTES
11/25/20 0317   Oxygen Therapy/Pulse Ox   O2 Therapy Oxygen humidified   O2 Device Heated high flow cannula   O2 Flow Rate (L/min) 30 L/min   FiO2  50 %   SpO2 94 %   Pulse Oximeter Device Mode Continuous

## 2020-11-25 NOTE — DISCHARGE INSTR - COC
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    Nerve pain M79.2    Paroxysmal atrial fibrillation ([facility]) I48.0    Rectal bleeding K62.5    Shortness of breath R06.02    Weakness generalized R53.1    CAD (coronary artery disease) I25.10    Chest pain R07.9    Generalized weakness R53.1    Severe sepsis ([facility]) A41.9, R65.20    Acute encephalopathy G93.40    Acute kidney injury ([facility] [address] N17.9    COVID-19 U07.1    Acute hypoxemic respiratory failure due to severe acute respiratory syndrome coronavirus 2 (SARS-CoV-2) disease ([facility]) U07.1, J96.01    Pulmonary infiltrates R91.8    Hypernatremia E87.0    Hyperglycemia R73.9    Elevated BUN R79.9    Aspiration into airway T17.908A       Isolation/Infection:   Isolation            Droplet Plus          Patient Infection Status       Infection Onset Added Last Indicated Last Indicated By Review Planned Expiration Resolved Resolved By    COVID-19 11/20/20 11/21/20 11/20/20 COVID-19 11/28/20 12/04/20      Resolved    COVID-19 Rule Out 11/20/20 11/20/20 11/20/20 COVID-19 (Ordered)   11/21/20 Rule-Out Test Resulted    COVID-19 Rule Out 11/17/20 11/17/20 11/17/20 COVID-19 (Ordered)   11/17/20 Rule-Out Test Resulted    C-diff Rule Out  12/13/19 12/14/19 GI Bacterial Pathogens By PCR (Ordered)   12/15/19 Rule-Out Test Resulted            Nurse Assessment:  Last Vital Signs: BP (!) 156/73   Pulse 60   Temp 98.2 °F (36.8 °C) (Axillary)   Resp 18   Ht 4' 10\" (1.473 m)   Wt 185 lb 12.8 oz (84.3 kg)   SpO2 96%   BMI 38.83 kg/m²     Last documented pain score (0-10 scale): Pain Level: 0  Last Weight:   Wt Readings from Last 1 Encounters:   11/25/20 185 lb 12.8 oz (84.3 kg)     Mental Status:  {IP PT MENTAL STATUS:20030:::0}    IV Access:  { BRITNEY IV ACCESS:069784068:::0}    Nursing Mobility/ADLs:  Walking   {CHP DME ADLs:996845257:::0}  Transfer  {CHP DME ADLs:513170214:::0}  Bathing  {CHP DME ADLs:228847532:::0}  Dressing  {CHP DME ADLs:580917664:::0}  Toileting  {CHP DME ADLs:591178476:::0}  Feeding  {CHP DME ADLs:165062655:::0}  Med Admin  {CHP DME ADLs:254900268:::0}  Med Delivery   { BRITNEY MED Delivery:152229608:::0}    Wound Care Documentation and Therapy:  Wound 11/17/20 Buttocks Inner;Left 5.5 cm x 3 cm (Active)   Wound Image   11/19/20 1520   Wound Etiology Pressure Stage  3 11/24/20 1119   Dressing Status Intact;Dry;Clean 11/24/20 2027   Wound Cleansed Cleansed with saline;Irrigated with saline 11/24/20 1119   Dressing/Treatment Pharmaceutical agent (see MAR); Moist to moist;Dry dressing; Other (comment) 11/24/20 1825   Dressing Change Due 11/25/20 11/24/20 1119   Wound Length (cm) 5.5 cm 11/19/20 1520   Wound Width (cm) 4 cm 11/19/20 1520   Wound Depth (cm) 0 cm 11/19/20 1520   Wound Surface Area (cm^2) 22 cm^2 11/19/20 1520   Change in Wound Size % (l*w) -33.33 11/19/20 1520   Wound Volume (cm^3) 0 cm^3 11/19/20 1520   Wound Assessment Eschar moist;Pink/red 11/24/20 1119   Drainage Amount Scant 11/24/20 1119   Drainage Description Serosanguinous 11/24/20 1119   Odor Malodorous/putrid 11/21/20 0409   Priscilla-wound Assessment Blanchable erythema 11/24/20 1119   Margins Attached edges 11/24/20 1119   Number of days: 7       Wound 11/17/20 Abdomen Mid;Upper (Active)   Wound Image   11/19/20 1520   Wound Etiology Other 11/19/20 1520   Dressing Status New dressing applied 11/25/20 0937   Wound Cleansed Cleansed with saline 11/19/20 1520   Dressing/Treatment Foam 11/25/20 0937   Dressing Change Due 11/21/20 11/21/20 0951   Wound Length (cm) 1 cm 11/19/20 1520   Wound Width (cm) 1.5 cm 11/19/20 1520   Wound Depth (cm) 0 cm 11/19/20 1520   Wound Surface Area (cm^2) 1.5 cm^2 11/19/20 1520   Change in Wound Size % (l*w) -50 11/19/20 1520   Wound Volume (cm^3) 0 cm^3 11/19/20 1520   Wound Assessment Erythema;Pink/red 11/25/20 0937   Drainage Amount Scant 11/25/20 0937   Drainage Description Serosanguinous 11/25/20 0937   Odor None 11/25/20 0937   Priscilla-wound Assessment Dry/flaky 11/25/20 0937   Margins Attached edges 11/22/20 0424   Number of days: 7       Wound 11/18/20 Sacrum Mid (Active)   Dressing Status Clean;Dry; Intact; New dressing applied 11/19/20 0323   Wound Cleansed Cleansed with saline 11/18/20 0815   Dressing/Treatment Foam 11/19/20 0323   Drainage Description Yellow 11/19/20 0323   Number of days: 7       Wound 11/19/20 Abdomen Mid;Lower (Active)   Wound Image   11/19/20 1520   Wound Etiology Other 11/19/20 1520   Dressing Status New dressing applied 11/25/20 0937   Wound Cleansed Irrigated with saline 11/20/20 2015   Dressing/Treatment Foam 11/25/20 0937   Dressing Change Due 11/21/20 11/21/20 0951   Wound Length (cm) 1.2 cm 11/19/20 1520   Wound Width (cm) 1.3 cm 11/19/20 1520   Wound Depth (cm) 0.1 cm 11/19/20 1520   Wound Surface Area (cm^2) 1.56 cm^2 11/19/20 1520   Wound Volume (cm^3) 0.16 cm^3 11/19/20 1520   Wound Assessment Pink/red 11/25/20 0937   Drainage Amount Scant 11/25/20 0937   Drainage Description Serosanguinous 11/25/20 0937   Odor None 11/19/20 1520   Priscilla-wound Assessment Dry/flaky 11/25/20 0937   Margins Attached edges 11/22/20 0424   Number of days: 5        Elimination:  Continence: Bowel: {YES / EV:25833}  Bladder: {YES / SX:10017}  Urinary Catheter: {Urinary Catheter:276069368:::0}   Colostomy/Ileostomy/Ileal Conduit: {YES / GZ:79587}       Date of Last BM: ***    Intake/Output Summary (Last 24 hours) at 11/25/2020 1311  Last data filed at 11/25/2020 0504  Gross per 24 hour   Intake 725 ml   Output 1575 ml   Net -850 ml     I/O last 3 completed shifts:   In: 725 [I.V.:525; NG/GT:200]  Out: 2100 [Urine:2100]    Safety Concerns:     [address] BRITNEY Safety Concerns:228993738:::0}    Impairments/Disabilities:      [address] TAN Impairments/Disabilities:031842915:::0}    Nutrition Therapy:  Current Nutrition Therapy:   [address] TAN Diet List:169227899:::0}    Routes of Feeding: {CHP DME Other Feedings:869556566:::0}  Liquids: {Slp liquid thickness:15061}  Daily Fluid Restriction: {CHP DME Yes amt example:370543647:::0}  Last Modified Barium Swallow with Video (Video Swallowing Test): {Done Not Done TUBD:741456364:::2}    Treatments at the Time of Hospital Discharge:   Respiratory Treatments: ***  Oxygen Therapy:  {Therapy; copd oxygen:46155:::0}  Ventilator:    {[facility] Vent List:081611698:::0}    Rehab Therapies: {THERAPEUTIC INTERVENTION:5680377351}  Weight Bearing Status/Restrictions: {[facility] Weight Bearin:::0}  Other Medical Equipment (for information only, NOT a DME order):  {EQUIPMENT:308413158}  Other Treatments: ***    Patient's personal belongings (please select all that are sent with patient):  {P DME Belongings:039063850:::0}    RN SIGNATURE:  {Esignature:067276502:::0}    CASE MANAGEMENT/SOCIAL WORK SECTION    Inpatient Status Date: ***    Readmission Risk Assessment Score:  Readmission Risk              Risk of Unplanned Readmission:        19           Discharging to Facility/ Agency   Name:   Address:  Phone:  Fax:    Dialysis Facility (if applicable)   Name:  Address:  Dialysis Schedule:  Phone:  Fax:    / signature: {Esignature:837791115:::0}    PHYSICIAN SECTION    Prognosis: Poor    Condition at Discharge: Terminal    Rehab Potential (if transferring to Rehab): Poor    Recommended Labs or Other Treatments After Discharge: Hospice to Notify PCP regarding patient     Physician Certification: I certify the above information and transfer of Karina Bourne  is necessary for the continuing treatment of the diagnosis listed and that she requires Hospice for less 30 days.      Update Admission H&P: No change in H&P    PHYSICIAN SIGNATURE:  Electronically signed by Griselda Narayan MD on 11/25/20 at 1:12 PM EST

## 2020-11-25 NOTE — PLAN OF CARE
Called and discussed with patient's daughter Andrew Blakely at [phone] and updated regarding patient's condition including overnight hypoxic event from aspiration pneumonia. Patient's daughter wishes hospice consultation. Agree and requested [facility] consult. Will follow. Continue rest of the current care.     Blu Munoz MD  Hospitalist Physician
Nutrition Problem #1: Inadequate energy intake  Intervention: Food and/or Nutrient Delivery: Modify Tube Feeding  Nutritional Goals: Patient will tolerate tube feeding without GI distress or electrolyte disturbances.
Problem: Falls - Risk of:  Goal: Will remain free from falls  Description: Will remain free from falls  Outcome: Met This Shift  Goal: Absence of physical injury  Description: Absence of physical injury  Outcome: Met This Shift     Problem: Skin Integrity:  Goal: Will show no infection signs and symptoms  Description: Will show no infection signs and symptoms  Outcome: Met This Shift  Goal: Absence of new skin breakdown  Description: Absence of new skin breakdown  Outcome: Met This Shift     Problem: SKIN INTEGRITY  Goal: Skin integrity is maintained or improved  Outcome: Met This Shift
Problem: Falls - Risk of:  Goal: Will remain free from falls  Description: Will remain free from falls  Outcome: Ongoing   Bed locked and in lowest position. Call light within reach. Belongings at bedside. Problem: Skin Integrity:  Goal: Absence of new skin breakdown  Description: Absence of new skin breakdown  Outcome: Ongoing   Q2hr turns/repositioning. 4eye assessments QShift.
Problem: Falls - Risk of:  Goal: Will remain free from falls  Description: Will remain free from falls  Outcome: Ongoing  Goal: Absence of physical injury  Description: Absence of physical injury  Outcome: Ongoing
Problem: Falls - Risk of:  Goal: Will remain free from falls  Description: Will remain free from falls  Outcome: Ongoing  Goal: Absence of physical injury  Description: Absence of physical injury  Outcome: Ongoing
Problem: Falls - Risk of:  Goal: Will remain free from falls  Description: Will remain free from falls  Outcome: Ongoing  Goal: Absence of physical injury  Description: Absence of physical injury  Outcome: Ongoing     Problem: Skin Integrity:  Goal: Will show no infection signs and symptoms  Description: Will show no infection signs and symptoms  Outcome: Ongoing  Goal: Absence of new skin breakdown  Description: Absence of new skin breakdown  Outcome: Ongoing
Problem: Skin Integrity:  Goal: Absence of new skin breakdown  Description: Absence of new skin breakdown  Outcome: Met This Shift  Note: No new areas of skin break down noted this shift. Pt turned and repositioned every 2 hours with wedge. BLE elevated off bed onto pillow with heels floated off pillow. Problem: OXYGENATION/RESPIRATORY FUNCTION  Goal: Patient will achieve/maintain normal respiratory rate/effort  Description: Respiratory rate and effort will be within normal limits for the patient  Outcome: Ongoing  Intervention: ASSESS OXYGENATION AS ORDERED  Note: O2 sats greater than 92% this shift on bipap. Continuous pulse ox in place. Intervention: ASSESS RESPIRATORY RATE, EFFORT AND BREATHING PATTERN  Note: Pt with tachypnea this shift. Problem: Gas Exchange - Impaired  Goal: Absence of hypoxia  Outcome: Ongoing  Intervention: Monitor oxygen saturation  Note: O2 sats greater than 92% on bipap this shift. Continuous pulse ox in place. Problem: Body Temperature -  Risk of, Imbalanced  Goal: Ability to maintain a body temperature within defined limits  Intervention: Body temperature monitoring  Note: Pt afebrile this shift.
Problem: Skin Integrity:  Goal: Will show no infection signs and symptoms  Description: Will show no infection signs and symptoms  Outcome: Ongoing  Goal: Absence of new skin breakdown  Description: Absence of new skin breakdown  Outcome: Ongoing     Problem: OXYGENATION/RESPIRATORY FUNCTION  Goal: Patient will maintain patent airway  Outcome: Ongoing  Goal: Patient will achieve/maintain normal respiratory rate/effort  Description: Respiratory rate and effort will be within normal limits for the patient  Outcome: Ongoing     Problem: SKIN INTEGRITY  Goal: Skin integrity is maintained or improved  Outcome: Ongoing     Problem: NUTRITION  Goal: Nutritional status is improving  Outcome: Ongoing     Problem: Gas Exchange - Impaired  Goal: Absence of hypoxia  Outcome: Ongoing  Goal: Promote optimal lung function  Outcome: Ongoing     Problem: Nutrition Deficits  Goal: Optimize nutrtional status  Outcome: Ongoing     Problem: Risk for Fluid Volume Deficit  Goal: Maintain normal heart rhythm  Outcome: Ongoing     Problem: Loneliness or Risk for Loneliness  Goal: Demonstrate positive use of time alone when socialization is not possible  Outcome: Ongoing     Problem: Fatigue  Goal: Verbalize increase energy and improved vitality  Outcome: Ongoing
care  Description: Discharged to appropriate level of care  Outcome: Ongoing     Problem: Airway Clearance - Ineffective:  Goal: Clear lung sounds  Description: Clear lung sounds  Outcome: Ongoing     Problem: Fluid Volume - Deficit:  Goal: Achieves intake and output within specified parameters  Description: Achieves intake and output within specified parameters  Outcome: Ongoing     Problem: Gas Exchange - Impaired:  Goal: Levels of oxygenation will improve  Description: Levels of oxygenation will improve  Outcome: Ongoing
to appropriate level of care  Outcome: Ongoing     Problem: Airway Clearance - Ineffective:  Goal: Ability to maintain a clear airway will improve  Description: Ability to maintain a clear airway will improve  Outcome: Ongoing     Problem: Gas Exchange - Impaired:  Goal: Levels of oxygenation will improve  Description: Levels of oxygenation will improve  Outcome: Ongoing  Note: Pt on Cont. Bipap machine sat>95  Problem: Hyperthermia:  Goal: Ability to maintain a body temperature in the normal range will improve  Description: Ability to maintain a body temperature in the normal range will improve  Outcome: Met This Shift  Note: Pt afebrile this shift. ANDREW     Problem: Gas Exchange - Impaired  Goal: Absence of hypoxia  11/22/2020 0533 by Jerrod Vann RN  Outcome: Ongoing     Problem: Isolation Precautions - Risk of Spread of Infection  Goal: Prevent transmission of infection  Outcome: Ongoing   5 on continuous pulse oximetry.  ANDREW

## 2020-11-25 NOTE — CARE COORDINATION
Case Management Discharge Summary- Hospice Discharge    Destination:   [address] Agency name and contact: Blane Serrano with [facility]     Durable Equipment arrangements are completed by the Hospice nurse. [address] [place] [address] Dr DELGADO signed and placed in discharge packet AND patient's hard chart. (This is required for DNR patients.) yes    Signed ECOC/AVS faxed to above agency/facility. yes    Transportation arrangements per Hospice RN. yes  Transport agency name and  time: [facility] Financial form completed and signed by:  [facility] RN  Transport form placed with discharge packet: yes    Notified Family: yes   Contact name: Samina Oh and Nancy Ardon     Patient's RN notified of plan: Stefine     Note:    Discharging nurse to complete nursing portion of ECOC, reconcile AVS, place final copy with patient's discharge packet. RN to ensure signed prescriptions are sent home with patient or the facility as per nursing protocol.

## 2020-11-25 NOTE — PROGRESS NOTES
MD Paged:    Hospice RN requesting pain medication for transportation to inpatient hospice. Patient NPO and NG removed per request. Can patient have once sublingual morphine dose. Thanks!     Electronically signed by Luis Duran RN on 11/25/2020 at 1:28 PM

## 2020-11-25 NOTE — PROGRESS NOTES
11/24/20 1926   Oxygen Therapy/Pulse Ox   O2 Therapy Oxygen humidified   O2 Device Heated high flow cannula   O2 Flow Rate (L/min) 30 L/min   FiO2  50 %   Resp 21   SpO2 98 %

## 2020-11-25 NOTE — PROGRESS NOTES
Vitals:    11/25/20 0921   BP: (!) 156/73   Pulse: 60   Resp: 18   Temp: 98.2 °F (36.8 °C)   SpO2: 95%     Pt resting quietly in bed alert but confused. Pt had no acute events overnight. Pt doesn't appear to be having pain at this time and does not have further needs. Potassium Chloride solution infusing at 75 ml/hour. NG in place at 65 ai. Vapotherm  At 50 FiO2 and 30 LPM. Bed locked in lowest position, call light within reach, bedside table within reach.  Will continue to monitor

## 2020-11-26 NOTE — DISCHARGE SUMMARY
Hospital Medicine Discharge Summary    Patient ID: Ashlie Cotton      Patient's PCP: Carolyn Bose MD    Admit Date: 11/17/2020     Discharge Date: 11/25/2020      Admitting Physician: wJ Domingo MD     Discharge Physician: Jw Domingo MD     Discharge Diagnoses: Active Hospital Problems    Diagnosis    Acute hypoxemic respiratory failure due to severe acute respiratory syndrome coronavirus 2 (SARS-CoV-2) disease ([facility]) [U07.1, J96.01]    Pulmonary infiltrates [R91.8]    Hypernatremia [E87.0]    Hyperglycemia [R73.9]    Elevated BUN [R79.9]    Aspiration into airway [T17.908A]    Acute encephalopathy [G93.40]    Acute kidney injury ([facility]) [N17.9]    COVID-19 [U07.1]       The patient was seen and examined on day of discharge and this discharge summary is in conjunction with any daily progress note from day of discharge. Hospital Course: By problem list  1.  Sepsis due to Klebsiella urinary tract infection POA -clinically sepsis syndrome resolved  -As evidenced by leukocytosis, lactic acidosis, abnormal urine analysis on admission  -Received aggressive fluid resuscitation as per sepsis protocol.  -Empirically started on Rocephin and azithromycin ED; urine cultures showed 75K Klebsiella sensitive to Rocephin-continued      2.  Acute kidney injury -BUN/creatinine-181/5.8 on admission -resolved  -Likely due to severe dehydration in addition to sepsis POA  -CT abdomen pelvis negative for nephrolithiasis and obstructive uropathy.  -Nephrology followed and assisted with management      3.  Acute hypernatremia -sodium 149 on admission consistent with dehydration. Ongoing  -Nephrology followed and assisted  with IV fluids . monitor BMP.      4.  Acute metabolic encephalopathy -likely due to above -ongoing  -CT head negative for acute intracranial abnormality and CT C-spine negative for fracture.  -Continue neurochecks and supportive care.   Neurology consulted and followed with no new recommendations.     5.  Hypertension -blood pressure normal on admission.    Resumed IV metoprolol 2.5 mg twice daily(as patient n.p.o.) -monitor     6.  History of pacemaker -stable     7.  Elevated troponin POA -likely in the setting of SHANIA.  Troponin 0.12 on admission.  Continue to trend.     8. Acute right isolated distal DVT of the peroneal vein  -as noted on bilateral venous duplex on 11/19/2020. VQ scan low probability for PE. Discontinue heparin GTT given low risk for progression to PE and risks outweigh benefits.      9. Acute hypoxic respiratory failure likely in the setting of sepsis related hydration/Covid infection  -Rapid Covid testing on admission negative ; given worsening respiratory failure Covid PCR sent by pulmonology on 11/20/2020 resulted positive. Continue droplet plus isolation  -Currently on OptiFlow. Follow-up chest x-ray on 11/20/2020 with mild vascular congestion otherwise no worsening infiltrates. -Echocardiogram obtained on 11/20/2020 s/o PFO/ASD; normal LVEF 55 to 60% with no regional wall motion abnormalities. -Pulmonology/critical care consulted and assisting   with management -okay to transition to nonrebreather mask prior to transfer to hospice unit  -Patient noted to have aspiration pneumonia on follow-up chest x-ray on 11/24/2020      10. Right sacral/buttock pressure ulcer with eschar POA -noted by wound care on 11/19/2020; recommended Santyl application and surgical consult for possible wound debridement  -General surgery evaluated and discussed with patient's daughter -who stated patient's wishes to be \"no intubation\" . Plan to continue conservative management at this time. Had multiple discussions with patient's daughters Jacquie Horner and Padmini Johnson) [facility] during [facility] stay regarding patient's condition and goals of care. Finally Srikanth Leroy and [facility] stated that they wished patient to be on comfort care. Requested hospice consultation.   HOC met with patient's family via video visit and agreed for transfer to [facility] inpatient unit. Patient transferred to [facility] in terminal condition     Physical Exam Performed:   BP (!) 170/81   Pulse 61   Temp 98.4 °F (36.9 °C) (Axillary)   Resp 20   Ht 4' 10\" (1.473 m)   Wt 185 lb 12.8 oz (84.3 kg)   SpO2 99%   BMI 38.83 kg/m²     I performed an audiovisual assessment, based on new provisions and guidance offered by [facility] on March 18, 2020 in setting of COVID-19 outbreak and in order to preserve personal protective equipment in accordance with the flexibilities announced by CMS on March 30, 2020. References:   https://[facility]. [place].[email or web address]/0/Resources/COVID-19/3_18%20Telemed%20Guidance%20Updated%20March%2018. pdf?xvo=[email or web address]   [email or web address] pdf      Bedside physical examination deferred. However, I did visually inspect the patient and observed the following:      General appearance: On OptiFlow at 70% FiO2 and 40 L/min. Neck: Deferred. Respiratory:  normal respiratory effort. Cardiovascular: Deferred. Abdomen: Deferred. Musculoskelatal: Deferred. Neurologic:  Deferred. Psychiatric: Deferred  Skin: Deferred. Labs: For convenience and continuity at follow-up the following most recent labs are provided:      CBC:    Lab Results   Component Value Date    WBC 8.3 11/25/2020    HGB 12.1 11/25/2020    HCT 36.9 11/25/2020     11/25/2020       Renal:    Lab Results   Component Value Date     11/25/2020    K 3.4 11/25/2020    K 3.4 11/25/2020     11/25/2020    CO2 30 11/25/2020    BUN 59 11/25/2020    CREATININE 1.1 11/25/2020    CALCIUM 8.0 11/25/2020    PHOS 3.4 11/25/2020     Significant Diagnostic Studies    Radiology:   XR CHEST PORTABLE   Final Result   Moderate bibasilar pulmonary opacities consistent with acute aspiration   pneumonia.          XR ABDOMEN (KUB) (SINGLE AP VIEW) Final Result   NG tube coiled in the fundus of the stomach. Mild small bowel ileus or   possible distal obstruction. XR CHEST PORTABLE   Final Result   Ongoing basilar opacities, at least in part reflecting atelectasis given   shallow inspiratory effort although mild aspiration sequela could be   superimposed. XR ABDOMEN (KUB) (SINGLE AP VIEW)   Final Result   Enteric tube in expected position. XR CHEST PORTABLE   Final Result   Left-sided dual lead pacemaker/ICD seen in place. Ill-defined left lower lobe opacity seen. No pneumothorax. Recommend   comparison with clinical exam and follow-up films. NM LUNG VENT/PERFUSION (VQ)   Final Result   Low probability for pulmonary embolus. VL Extremity Venous Bilateral   Final Result      XR CHEST PORTABLE   Final Result   Bibasilar airspace disease not significantly changed. CT ABDOMEN PELVIS WO CONTRAST   Final Result   No acute inflammatory process or bowel obstruction. Negative nephrolithiasis or obstructive uropathy. CT HEAD WO CONTRAST   Final Result   No acute intracranial abnormality. CT CERVICAL SPINE WO CONTRAST   Final Result   . No acute abnormality of the cervical spine. Moderate cervical spondylosis. Moderate to severe facet arthropathy. Prior fusion of C5 and C6. No   significant change from the prior study. XR WRIST LEFT (MIN 3 VIEWS)   Final Result   No acute fracture. Mild soft tissue swelling. Degenerative changes. XR CHEST PORTABLE   Final Result   Interval development increase interstitial markings bilaterally when compared   to October 5, 2020.   Patchy appearance infiltrate specially the left raising   the possibly of infiltrates           Consults:   IP CONSULT TO NEPHROLOGY  IP CONSULT TO HOSPITALIST  IP CONSULT TO SOCIAL WORK  IP CONSULT TO GENERAL SURGERY  IP CONSULT TO CARDIOLOGY  IP CONSULT TO CRITICAL CARE  IP CONSULT TO NEUROLOGY  IP CONSULT TO INFECTIOUS DISEASES  IP CONSULT TO PHARMACY  IP CONSULT TO DIETITIAN  IP CONSULT TO HOSPICE    Disposition: Eliecer Goodwin    Condition at Discharge: Terminal    Discharge Instructions/Follow-up: Hospice to update PCP about patient    Code Status:  DNR -CC     Activity: activity as tolerated    Diet: Pleasure Feeds as tolerated       Discharge Medications:   Discharge Medication List as of 11/25/2020  2:28 PM           Details   dexamethasone (DEXAMETHASONE INTENSOL) 1 MG/ML solution Take 6 mLs by mouth daily for 5 days, Disp-30 mL,R-0Normal              Details   metoprolol succinate (TOPROL XL) 25 MG extended release tablet Take 1 tablet by mouth daily, Disp-30 tablet,R-3Normal      omeprazole (PRILOSEC) 20 MG delayed release capsule TAKE 1 CAPSULE DAILY, Disp-90 capsule,R-3Normal      bisacodyl (BISACODYL) 5 MG EC tablet Take 4 tablets of Bisacodyl for colonoscopy prep as directed., Disp-4 tablet, R-0Normal      Elastic Bandages & Supports (JOBST KNEE HIGH COMPRESSION SM) MISC DAILY Starting Thu 7/18/2019, Disp-1 each, R-2, Print      rOPINIRole (REQUIP) 0.5 MG tablet Take 0.5 mg by mouth nightlyHistorical Med      sertraline (ZOLOFT) 100 MG tablet Take 100 mg by mouth dailyHistorical Med      atorvastatin (LIPITOR) 40 MG tablet Take 40 mg by mouth dailyHistorical Med             Time Spent on discharge is more than 30 minutes in the examination, evaluation, counseling and review of medications and discharge plan. Signed:    Jennifer Beal MD   11/25/2020      Thank you Anju White MD for the opportunity to be involved in this patient's care. If you have any questions or concerns please feel free to contact me at [phone].

## 2021-01-19 ENCOUNTER — TELEPHONE (OUTPATIENT)
Dept: CARDIOLOGY CLINIC | Age: 81
End: 2021-01-19

## 2021-01-19 NOTE — TELEPHONE ENCOUNTER
Contacted pt's dtr Dereck Uribe to get a better working # for pt and find out if pt is still at [facility] for her Chris Nacho Fontanaj[address]ie [address]38 with [address] on 1-21-21. Dereck Uribe informed me that the pt passed away on 12-4-21. Pt's chart is marked. Pt also had a device.

## 2022-11-08 NOTE — PROGRESS NOTES
11/29/20 @ 22:55 sent [employer] to Dr. Dave Quintero for Gen Surg consult. Dr. Rochelle Little.  Karlos Buchanan 77

## (undated) DEVICE — PAD,ABDOMINAL,8"X10",ST,LF: Brand: MEDLINE

## (undated) DEVICE — BANDAGE,GAUZE,4.5"X4.1YD,STERILE,LF: Brand: MEDLINE

## (undated) DEVICE — 1-PIECE DRAINABLE OSTOMY POUCH, SOFTFLEX: Brand: PREMIER

## (undated) DEVICE — MINOR SET UP: Brand: MEDLINE INDUSTRIES, INC.

## (undated) DEVICE — SOLUTION IV IRRIG 500ML 0.9% SODIUM CHL 2F7123

## (undated) DEVICE — GAUZE,SPONGE,4"X4",8PLY,STRL,LF,10/TRAY: Brand: MEDLINE

## (undated) DEVICE — FORCEPS ENDOSCP BX L230CM DIA28MM ALGTR CUP SPEC RETRV GI

## (undated) DEVICE — COVER LT HNDL PLAS RIG 2 PER PK

## (undated) DEVICE — CONMED SCOPE SAVER BITE BLOCK, 20X27 MM: Brand: SCOPE SAVER

## (undated) DEVICE — ENDO CARRY-ON PROCEDURE KIT INCLUDES SUCTION TUBING, LUBRICANT, GAUZE, BIOHAZARD STICKER, TRANSPORT PAD AND INTERCEPT BEDSIDE KIT.: Brand: ENDO CARRY-ON PROCEDURE KIT